# Patient Record
Sex: MALE | Race: ASIAN | NOT HISPANIC OR LATINO | Employment: OTHER | URBAN - METROPOLITAN AREA
[De-identification: names, ages, dates, MRNs, and addresses within clinical notes are randomized per-mention and may not be internally consistent; named-entity substitution may affect disease eponyms.]

---

## 2017-01-05 ENCOUNTER — COMMUNICATION - HEALTHEAST (OUTPATIENT)
Dept: INTERNAL MEDICINE | Facility: CLINIC | Age: 82
End: 2017-01-05

## 2017-01-05 DIAGNOSIS — E78.5 HYPERLIPEMIA: ICD-10-CM

## 2017-01-05 DIAGNOSIS — I10 ESSENTIAL HYPERTENSION, BENIGN: ICD-10-CM

## 2017-03-21 ENCOUNTER — OFFICE VISIT - HEALTHEAST (OUTPATIENT)
Dept: INTERNAL MEDICINE | Facility: CLINIC | Age: 82
End: 2017-03-21

## 2017-03-21 DIAGNOSIS — E03.9 HYPOTHYROIDISM: ICD-10-CM

## 2017-03-21 DIAGNOSIS — I10 ESSENTIAL HYPERTENSION, BENIGN: ICD-10-CM

## 2017-03-23 ENCOUNTER — COMMUNICATION - HEALTHEAST (OUTPATIENT)
Dept: INTERNAL MEDICINE | Facility: CLINIC | Age: 82
End: 2017-03-23

## 2017-03-23 DIAGNOSIS — E78.5 HYPERLIPEMIA: ICD-10-CM

## 2017-04-06 ENCOUNTER — OFFICE VISIT - HEALTHEAST (OUTPATIENT)
Dept: INTERNAL MEDICINE | Facility: CLINIC | Age: 82
End: 2017-04-06

## 2017-04-06 DIAGNOSIS — I10 ESSENTIAL HYPERTENSION, BENIGN: ICD-10-CM

## 2017-04-06 DIAGNOSIS — E78.5 HLD (HYPERLIPIDEMIA): ICD-10-CM

## 2017-04-06 DIAGNOSIS — R25.2 CRAMP OF BOTH LOWER EXTREMITIES: ICD-10-CM
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
## 2017-04-06 LAB
ATRIAL RATE - MUSE: 73 BPM
CHOLEST SERPL-MCNC: 181 MG/DL
DIASTOLIC BLOOD PRESSURE - MUSE: NORMAL MMHG
FASTING STATUS PATIENT QL REPORTED: NO
HDLC SERPL-MCNC: 39 MG/DL
INTERPRETATION ECG - MUSE: NORMAL
LDLC SERPL CALC-MCNC: 95 MG/DL
P AXIS - MUSE: 27 DEGREES
PR INTERVAL - MUSE: 160 MS
QRS DURATION - MUSE: 88 MS
QT - MUSE: 380 MS
QTC - MUSE: 418 MS
R AXIS - MUSE: -1 DEGREES
SYSTOLIC BLOOD PRESSURE - MUSE: NORMAL MMHG
T AXIS - MUSE: -15 DEGREES
TRIGL SERPL-MCNC: 234 MG/DL
VENTRICULAR RATE- MUSE: 73 BPM

## 2017-04-12 ENCOUNTER — COMMUNICATION - HEALTHEAST (OUTPATIENT)
Dept: INTERNAL MEDICINE | Facility: CLINIC | Age: 82
End: 2017-04-12

## 2017-04-12 DIAGNOSIS — N18.30 CKD (CHRONIC KIDNEY DISEASE) STAGE 3, GFR 30-59 ML/MIN (H): ICD-10-CM

## 2017-04-20 ENCOUNTER — AMBULATORY - HEALTHEAST (OUTPATIENT)
Dept: LAB | Facility: CLINIC | Age: 82
End: 2017-04-20

## 2017-04-20 DIAGNOSIS — N18.30 CKD (CHRONIC KIDNEY DISEASE) STAGE 3, GFR 30-59 ML/MIN (H): ICD-10-CM

## 2017-06-24 ENCOUNTER — COMMUNICATION - HEALTHEAST (OUTPATIENT)
Dept: INTERNAL MEDICINE | Facility: CLINIC | Age: 82
End: 2017-06-24

## 2017-06-24 DIAGNOSIS — E78.5 HYPERLIPEMIA: ICD-10-CM

## 2017-06-27 ENCOUNTER — OFFICE VISIT - HEALTHEAST (OUTPATIENT)
Dept: INTERNAL MEDICINE | Facility: CLINIC | Age: 82
End: 2017-06-27

## 2017-06-27 DIAGNOSIS — H54.3 LOW, VISION, BOTH EYES: ICD-10-CM

## 2017-06-27 DIAGNOSIS — E78.5 HYPERLIPEMIA: ICD-10-CM

## 2017-06-27 DIAGNOSIS — H54.7 UNSPECIFIED VISUAL LOSS: ICD-10-CM

## 2017-06-27 DIAGNOSIS — H91.93 HARD OF HEARING, BILATERAL: ICD-10-CM

## 2017-06-27 DIAGNOSIS — E78.5 HLD (HYPERLIPIDEMIA): ICD-10-CM

## 2017-06-27 DIAGNOSIS — I10 HTN (HYPERTENSION): ICD-10-CM

## 2017-06-27 DIAGNOSIS — I10 ESSENTIAL HYPERTENSION, BENIGN: ICD-10-CM

## 2017-06-27 DIAGNOSIS — E03.9 HYPOTHYROIDISM: ICD-10-CM

## 2017-09-19 ENCOUNTER — OFFICE VISIT - HEALTHEAST (OUTPATIENT)
Dept: INTERNAL MEDICINE | Facility: CLINIC | Age: 82
End: 2017-09-19

## 2017-09-19 DIAGNOSIS — S23.41XA RIB SPRAIN: ICD-10-CM

## 2017-10-09 ENCOUNTER — COMMUNICATION - HEALTHEAST (OUTPATIENT)
Dept: FAMILY MEDICINE | Facility: CLINIC | Age: 82
End: 2017-10-09

## 2017-10-09 DIAGNOSIS — M81.0 OSTEOPOROSIS: ICD-10-CM

## 2017-10-09 DIAGNOSIS — Z00.00 HEALTHCARE MAINTENANCE: ICD-10-CM

## 2017-12-20 ENCOUNTER — OFFICE VISIT - HEALTHEAST (OUTPATIENT)
Dept: INTERNAL MEDICINE | Facility: CLINIC | Age: 82
End: 2017-12-20

## 2017-12-20 DIAGNOSIS — E78.5 HLD (HYPERLIPIDEMIA): ICD-10-CM

## 2017-12-20 DIAGNOSIS — E03.9 HYPOTHYROIDISM: ICD-10-CM

## 2017-12-20 DIAGNOSIS — I10 HTN (HYPERTENSION): ICD-10-CM

## 2018-01-03 ENCOUNTER — COMMUNICATION - HEALTHEAST (OUTPATIENT)
Dept: INTERNAL MEDICINE | Facility: CLINIC | Age: 83
End: 2018-01-03

## 2018-01-04 ENCOUNTER — OFFICE VISIT - HEALTHEAST (OUTPATIENT)
Dept: INTERNAL MEDICINE | Facility: CLINIC | Age: 83
End: 2018-01-04

## 2018-01-04 ENCOUNTER — COMMUNICATION - HEALTHEAST (OUTPATIENT)
Dept: INTERNAL MEDICINE | Facility: CLINIC | Age: 83
End: 2018-01-04

## 2018-01-04 DIAGNOSIS — K62.5 RECTAL BLEEDING: ICD-10-CM

## 2018-01-04 LAB — HGB BLD-MCNC: 17 G/DL (ref 14–18)

## 2018-04-11 ENCOUNTER — OFFICE VISIT - HEALTHEAST (OUTPATIENT)
Dept: INTERNAL MEDICINE | Facility: CLINIC | Age: 83
End: 2018-04-11

## 2018-04-11 DIAGNOSIS — H91.90 HARD OF HEARING: ICD-10-CM

## 2018-04-11 DIAGNOSIS — Z02.89 ENCOUNTER FOR COMPLETION OF FORM WITH PATIENT: ICD-10-CM

## 2018-04-11 DIAGNOSIS — E78.5 HYPERLIPEMIA: ICD-10-CM

## 2018-04-11 DIAGNOSIS — K59.00 CONSTIPATION: ICD-10-CM

## 2018-04-11 DIAGNOSIS — I10 ESSENTIAL HYPERTENSION, BENIGN: ICD-10-CM

## 2018-04-11 RX ORDER — ACETAMINOPHEN 500 MG
500 TABLET ORAL EVERY 4 HOURS PRN
Qty: 30 TABLET | Refills: 0 | Status: SHIPPED | OUTPATIENT
Start: 2018-04-11 | End: 2023-11-02

## 2018-04-11 RX ORDER — POLYETHYLENE GLYCOL 3350 17 G/17G
17 POWDER, FOR SOLUTION ORAL DAILY PRN
Qty: 510 G | Refills: 5 | Status: SHIPPED | OUTPATIENT
Start: 2018-04-11 | End: 2021-09-26

## 2018-06-01 ENCOUNTER — SURGERY - HEALTHEAST (OUTPATIENT)
Dept: SURGERY | Facility: HOSPITAL | Age: 83
End: 2018-06-01

## 2018-06-01 ENCOUNTER — ANESTHESIA - HEALTHEAST (OUTPATIENT)
Dept: SURGERY | Facility: HOSPITAL | Age: 83
End: 2018-06-01

## 2018-06-01 ASSESSMENT — MIFFLIN-ST. JEOR
SCORE: 1261.5
SCORE: 1258.72
SCORE: 1258.72
SCORE: 1261.5

## 2018-06-02 ENCOUNTER — SURGERY - HEALTHEAST (OUTPATIENT)
Dept: SURGERY | Facility: HOSPITAL | Age: 83
End: 2018-06-02

## 2018-06-02 ENCOUNTER — ANESTHESIA - HEALTHEAST (OUTPATIENT)
Dept: SURGERY | Facility: HOSPITAL | Age: 83
End: 2018-06-02

## 2018-06-02 ASSESSMENT — MIFFLIN-ST. JEOR
SCORE: 1285.48
SCORE: 1285.48

## 2018-06-07 ENCOUNTER — OFFICE VISIT - HEALTHEAST (OUTPATIENT)
Dept: INTERNAL MEDICINE | Facility: CLINIC | Age: 83
End: 2018-06-07

## 2018-06-07 DIAGNOSIS — I10 HTN (HYPERTENSION): ICD-10-CM

## 2018-06-07 DIAGNOSIS — I71.60 THORACOABDOMINAL AORTIC ANEURYSM (TAAA) WITHOUT RUPTURE (H): ICD-10-CM

## 2018-06-07 DIAGNOSIS — N17.9 AKI (ACUTE KIDNEY INJURY) (H): ICD-10-CM

## 2018-06-07 DIAGNOSIS — Z90.49 S/P CHOLECYSTECTOMY: ICD-10-CM

## 2018-06-07 DIAGNOSIS — Z09 HOSPITAL DISCHARGE FOLLOW-UP: ICD-10-CM

## 2018-06-07 DIAGNOSIS — K80.50 CHOLEDOCHOLITHIASIS: ICD-10-CM

## 2018-06-07 LAB
ALBUMIN SERPL-MCNC: 3.3 G/DL (ref 3.5–5)
ALP SERPL-CCNC: 104 U/L (ref 45–120)
ALT SERPL W P-5'-P-CCNC: 129 U/L (ref 0–45)
ANION GAP SERPL CALCULATED.3IONS-SCNC: 11 MMOL/L (ref 5–18)
AST SERPL W P-5'-P-CCNC: 46 U/L (ref 0–40)
BASOPHILS # BLD AUTO: 0.1 THOU/UL (ref 0–0.2)
BASOPHILS NFR BLD AUTO: 1 % (ref 0–2)
BILIRUB DIRECT SERPL-MCNC: 0.6 MG/DL
BILIRUB SERPL-MCNC: 1.5 MG/DL (ref 0–1)
BUN SERPL-MCNC: 21 MG/DL (ref 8–28)
CALCIUM SERPL-MCNC: 8.8 MG/DL (ref 8.5–10.5)
CHLORIDE BLD-SCNC: 105 MMOL/L (ref 98–107)
CO2 SERPL-SCNC: 24 MMOL/L (ref 22–31)
CREAT SERPL-MCNC: 1.12 MG/DL (ref 0.7–1.3)
EOSINOPHIL # BLD AUTO: 0.2 THOU/UL (ref 0–0.4)
EOSINOPHIL NFR BLD AUTO: 2 % (ref 0–6)
ERYTHROCYTE [DISTWIDTH] IN BLOOD BY AUTOMATED COUNT: 11.3 % (ref 11–14.5)
GFR SERPL CREATININE-BSD FRML MDRD: >60 ML/MIN/1.73M2
GLUCOSE BLD-MCNC: 115 MG/DL (ref 70–125)
HCT VFR BLD AUTO: 49.4 % (ref 40–54)
HGB BLD-MCNC: 16.3 G/DL (ref 14–18)
LYMPHOCYTES # BLD AUTO: 2 THOU/UL (ref 0.8–4.4)
LYMPHOCYTES NFR BLD AUTO: 21 % (ref 20–40)
MAGNESIUM SERPL-MCNC: 2.1 MG/DL (ref 1.8–2.6)
MCH RBC QN AUTO: 32.4 PG (ref 27–34)
MCHC RBC AUTO-ENTMCNC: 33 G/DL (ref 32–36)
MCV RBC AUTO: 98 FL (ref 80–100)
MONOCYTES # BLD AUTO: 1 THOU/UL (ref 0–0.9)
MONOCYTES NFR BLD AUTO: 10 % (ref 2–10)
NEUTROPHILS # BLD AUTO: 6.6 THOU/UL (ref 2–7.7)
NEUTROPHILS NFR BLD AUTO: 66 % (ref 50–70)
PHOSPHATE SERPL-MCNC: 3.3 MG/DL (ref 2.5–4.5)
PLATELET # BLD AUTO: 141 THOU/UL (ref 140–440)
PMV BLD AUTO: 7.4 FL (ref 7–10)
POTASSIUM BLD-SCNC: 4.2 MMOL/L (ref 3.5–5)
PROT SERPL-MCNC: 6.8 G/DL (ref 6–8)
RBC # BLD AUTO: 5.02 MILL/UL (ref 4.4–6.2)
SODIUM SERPL-SCNC: 140 MMOL/L (ref 136–145)
WBC: 9.9 THOU/UL (ref 4–11)

## 2018-06-11 ENCOUNTER — COMMUNICATION - HEALTHEAST (OUTPATIENT)
Dept: INTERNAL MEDICINE | Facility: CLINIC | Age: 83
End: 2018-06-11

## 2018-09-27 ENCOUNTER — COMMUNICATION - HEALTHEAST (OUTPATIENT)
Dept: FAMILY MEDICINE | Facility: CLINIC | Age: 83
End: 2018-09-27

## 2018-09-27 DIAGNOSIS — M81.0 OSTEOPOROSIS: ICD-10-CM

## 2018-09-27 DIAGNOSIS — Z00.00 HEALTHCARE MAINTENANCE: ICD-10-CM

## 2019-05-29 ENCOUNTER — OFFICE VISIT - HEALTHEAST (OUTPATIENT)
Dept: INTERNAL MEDICINE | Facility: CLINIC | Age: 84
End: 2019-05-29

## 2019-05-29 DIAGNOSIS — I10 ESSENTIAL HYPERTENSION: ICD-10-CM

## 2019-05-29 DIAGNOSIS — I71.60 THORACOABDOMINAL AORTIC ANEURYSM (TAAA) WITHOUT RUPTURE (H): ICD-10-CM

## 2019-05-29 DIAGNOSIS — D58.2 ELEVATED HEMOGLOBIN (H): ICD-10-CM

## 2019-05-29 DIAGNOSIS — E78.2 MIXED HYPERLIPIDEMIA: ICD-10-CM

## 2019-05-29 DIAGNOSIS — Z00.00 WELCOME TO MEDICARE PREVENTIVE VISIT: ICD-10-CM

## 2019-05-29 DIAGNOSIS — R41.3 MEMORY CHANGES: ICD-10-CM

## 2019-05-29 DIAGNOSIS — E03.9 ACQUIRED HYPOTHYROIDISM: ICD-10-CM

## 2019-05-29 LAB
ALBUMIN SERPL-MCNC: 4 G/DL (ref 3.5–5)
ALP SERPL-CCNC: 57 U/L (ref 45–120)
ALT SERPL W P-5'-P-CCNC: 21 U/L (ref 0–45)
ANION GAP SERPL CALCULATED.3IONS-SCNC: 11 MMOL/L (ref 5–18)
AST SERPL W P-5'-P-CCNC: 27 U/L (ref 0–40)
BILIRUB DIRECT SERPL-MCNC: 0.5 MG/DL
BILIRUB SERPL-MCNC: 1.5 MG/DL (ref 0–1)
BUN SERPL-MCNC: 29 MG/DL (ref 8–28)
CALCIUM SERPL-MCNC: 9.9 MG/DL (ref 8.5–10.5)
CHLORIDE BLD-SCNC: 104 MMOL/L (ref 98–107)
CHOLEST SERPL-MCNC: 186 MG/DL
CO2 SERPL-SCNC: 24 MMOL/L (ref 22–31)
CREAT SERPL-MCNC: 1.38 MG/DL (ref 0.7–1.3)
ERYTHROCYTE [DISTWIDTH] IN BLOOD BY AUTOMATED COUNT: 11.2 % (ref 11–14.5)
FASTING STATUS PATIENT QL REPORTED: YES
GFR SERPL CREATININE-BSD FRML MDRD: 49 ML/MIN/1.73M2
GLUCOSE BLD-MCNC: 92 MG/DL (ref 70–125)
HCT VFR BLD AUTO: 56 % (ref 40–54)
HDLC SERPL-MCNC: 48 MG/DL
HGB BLD-MCNC: 18.8 G/DL (ref 14–18)
IRON SERPL-MCNC: 89 UG/DL (ref 42–175)
LDLC SERPL CALC-MCNC: 107 MG/DL
MCH RBC QN AUTO: 32.8 PG (ref 27–34)
MCHC RBC AUTO-ENTMCNC: 33.5 G/DL (ref 32–36)
MCV RBC AUTO: 98 FL (ref 80–100)
PLATELET # BLD AUTO: 169 THOU/UL (ref 140–440)
PMV BLD AUTO: 7.8 FL (ref 7–10)
POTASSIUM BLD-SCNC: 3.6 MMOL/L (ref 3.5–5)
PROT SERPL-MCNC: 7.5 G/DL (ref 6–8)
RBC # BLD AUTO: 5.72 MILL/UL (ref 4.4–6.2)
SODIUM SERPL-SCNC: 139 MMOL/L (ref 136–145)
T3 SERPL-MCNC: 96 NG/DL (ref 45–175)
T4 FREE SERPL-MCNC: 0.7 NG/DL (ref 0.7–1.8)
TRIGL SERPL-MCNC: 157 MG/DL
TSH SERPL DL<=0.005 MIU/L-ACNC: 8.15 UIU/ML (ref 0.3–5)
WBC: 6.2 THOU/UL (ref 4–11)

## 2019-05-29 RX ORDER — ATORVASTATIN CALCIUM 40 MG/1
40 TABLET, FILM COATED ORAL AT BEDTIME
Qty: 90 TABLET | Refills: 3 | Status: SHIPPED | OUTPATIENT
Start: 2019-05-29 | End: 2021-09-21

## 2019-05-29 ASSESSMENT — MIFFLIN-ST. JEOR: SCORE: 1231.5

## 2019-06-05 ENCOUNTER — COMMUNICATION - HEALTHEAST (OUTPATIENT)
Dept: INTERNAL MEDICINE | Facility: CLINIC | Age: 84
End: 2019-06-05

## 2019-06-12 ENCOUNTER — HOSPITAL ENCOUNTER (OUTPATIENT)
Dept: CT IMAGING | Facility: HOSPITAL | Age: 84
Discharge: HOME | End: 2019-06-12
Attending: INTERPRETER

## 2019-06-12 DIAGNOSIS — I71.60 THORACOABDOMINAL AORTIC ANEURYSM (TAAA) WITHOUT RUPTURE (H): ICD-10-CM

## 2019-06-12 LAB
CREAT BLD-MCNC: 1.1 MG/DL (ref 0.7–1.3)
GFR SERPL CREATININE-BSD FRML MDRD: >60 ML/MIN/1.73M2

## 2019-06-18 ENCOUNTER — COMMUNICATION - HEALTHEAST (OUTPATIENT)
Dept: INTERNAL MEDICINE | Facility: CLINIC | Age: 84
End: 2019-06-18

## 2019-06-21 ENCOUNTER — COMMUNICATION - HEALTHEAST (OUTPATIENT)
Dept: INTERNAL MEDICINE | Facility: CLINIC | Age: 84
End: 2019-06-21

## 2019-06-21 DIAGNOSIS — E78.5 HYPERLIPEMIA: ICD-10-CM

## 2019-09-19 ENCOUNTER — COMMUNICATION - HEALTHEAST (OUTPATIENT)
Dept: FAMILY MEDICINE | Facility: CLINIC | Age: 84
End: 2019-09-19

## 2019-09-19 DIAGNOSIS — Z00.00 HEALTHCARE MAINTENANCE: ICD-10-CM

## 2019-09-19 DIAGNOSIS — M81.0 OSTEOPOROSIS: ICD-10-CM

## 2020-06-11 ENCOUNTER — COMMUNICATION - HEALTHEAST (OUTPATIENT)
Dept: INTERNAL MEDICINE | Facility: CLINIC | Age: 85
End: 2020-06-11

## 2020-06-11 DIAGNOSIS — I10 ESSENTIAL HYPERTENSION: ICD-10-CM

## 2020-09-29 ENCOUNTER — COMMUNICATION - HEALTHEAST (OUTPATIENT)
Dept: INTERNAL MEDICINE | Facility: CLINIC | Age: 85
End: 2020-09-29

## 2020-09-29 DIAGNOSIS — M81.0 OSTEOPOROSIS: ICD-10-CM

## 2020-12-09 ENCOUNTER — COMMUNICATION - HEALTHEAST (OUTPATIENT)
Dept: INTERNAL MEDICINE | Facility: CLINIC | Age: 85
End: 2020-12-09

## 2020-12-09 DIAGNOSIS — I10 ESSENTIAL HYPERTENSION: ICD-10-CM

## 2021-01-08 ENCOUNTER — OFFICE VISIT - HEALTHEAST (OUTPATIENT)
Dept: INTERNAL MEDICINE | Facility: CLINIC | Age: 86
End: 2021-01-08

## 2021-01-08 ENCOUNTER — RECORDS - HEALTHEAST (OUTPATIENT)
Dept: ADMINISTRATIVE | Facility: OTHER | Age: 86
End: 2021-01-08

## 2021-01-08 DIAGNOSIS — I71.60 THORACOABDOMINAL AORTIC ANEURYSM (TAAA) WITHOUT RUPTURE (H): ICD-10-CM

## 2021-01-08 DIAGNOSIS — I10 ESSENTIAL HYPERTENSION: ICD-10-CM

## 2021-01-08 DIAGNOSIS — H91.93 BILATERAL HEARING LOSS, UNSPECIFIED HEARING LOSS TYPE: ICD-10-CM

## 2021-01-08 DIAGNOSIS — E03.8 SUBCLINICAL HYPOTHYROIDISM: ICD-10-CM

## 2021-01-08 DIAGNOSIS — D58.2 ELEVATED HEMOGLOBIN (H): ICD-10-CM

## 2021-01-08 DIAGNOSIS — E78.2 MIXED HYPERLIPIDEMIA: ICD-10-CM

## 2021-01-08 DIAGNOSIS — H53.8 BLURRED VISION: ICD-10-CM

## 2021-01-08 LAB
ALBUMIN SERPL-MCNC: 4.1 G/DL (ref 3.5–5)
ALP SERPL-CCNC: 70 U/L (ref 45–120)
ALT SERPL W P-5'-P-CCNC: 17 U/L (ref 0–45)
ANION GAP SERPL CALCULATED.3IONS-SCNC: 11 MMOL/L (ref 5–18)
AST SERPL W P-5'-P-CCNC: 23 U/L (ref 0–40)
BASOPHILS # BLD AUTO: 0.1 THOU/UL (ref 0–0.2)
BASOPHILS NFR BLD AUTO: 1 % (ref 0–2)
BILIRUB SERPL-MCNC: 1.1 MG/DL (ref 0–1)
BUN SERPL-MCNC: 22 MG/DL (ref 8–28)
CALCIUM SERPL-MCNC: 9.2 MG/DL (ref 8.5–10.5)
CHLORIDE BLD-SCNC: 102 MMOL/L (ref 98–107)
CO2 SERPL-SCNC: 27 MMOL/L (ref 22–31)
CREAT SERPL-MCNC: 1.19 MG/DL (ref 0.7–1.3)
EOSINOPHIL # BLD AUTO: 0.1 THOU/UL (ref 0–0.4)
EOSINOPHIL NFR BLD AUTO: 1 % (ref 0–6)
ERYTHROCYTE [DISTWIDTH] IN BLOOD BY AUTOMATED COUNT: 12.8 % (ref 11–14.5)
GFR SERPL CREATININE-BSD FRML MDRD: 58 ML/MIN/1.73M2
GLUCOSE BLD-MCNC: 93 MG/DL (ref 70–125)
HCT VFR BLD AUTO: 57.8 % (ref 40–54)
HGB BLD-MCNC: 18.8 G/DL (ref 14–18)
IMM GRANULOCYTES # BLD: 0 THOU/UL
IMM GRANULOCYTES NFR BLD: 0 %
LYMPHOCYTES # BLD AUTO: 2.5 THOU/UL (ref 0.8–4.4)
LYMPHOCYTES NFR BLD AUTO: 39 % (ref 20–40)
MCH RBC QN AUTO: 31.5 PG (ref 27–34)
MCHC RBC AUTO-ENTMCNC: 32.5 G/DL (ref 32–36)
MCV RBC AUTO: 97 FL (ref 80–100)
MONOCYTES # BLD AUTO: 0.5 THOU/UL (ref 0–0.9)
MONOCYTES NFR BLD AUTO: 8 % (ref 2–10)
NEUTROPHILS # BLD AUTO: 3.3 THOU/UL (ref 2–7.7)
NEUTROPHILS NFR BLD AUTO: 51 % (ref 50–70)
PLATELET # BLD AUTO: 155 THOU/UL (ref 140–440)
PMV BLD AUTO: 10.8 FL (ref 8.5–12.5)
POTASSIUM BLD-SCNC: 3.9 MMOL/L (ref 3.5–5)
PROT SERPL-MCNC: 7.7 G/DL (ref 6–8)
RBC # BLD AUTO: 5.96 MILL/UL (ref 4.4–6.2)
SODIUM SERPL-SCNC: 140 MMOL/L (ref 136–145)
T4 FREE SERPL-MCNC: 0.7 NG/DL (ref 0.7–1.8)
TSH SERPL DL<=0.005 MIU/L-ACNC: 6.7 UIU/ML (ref 0.3–5)
WBC: 6.5 THOU/UL (ref 4–11)

## 2021-01-08 RX ORDER — ATORVASTATIN CALCIUM 40 MG/1
TABLET, FILM COATED ORAL
Qty: 90 TABLET | Refills: 3 | Status: SHIPPED | OUTPATIENT
Start: 2021-01-08 | End: 2021-09-26

## 2021-01-08 RX ORDER — LISINOPRIL AND HYDROCHLOROTHIAZIDE 20; 25 MG/1; MG/1
1 TABLET ORAL DAILY
Qty: 30 TABLET | Refills: 0 | Status: SHIPPED | OUTPATIENT
Start: 2021-01-08 | End: 2021-09-26

## 2021-01-12 ENCOUNTER — COMMUNICATION - HEALTHEAST (OUTPATIENT)
Dept: FAMILY MEDICINE | Facility: CLINIC | Age: 86
End: 2021-01-12

## 2021-01-19 ENCOUNTER — HOSPITAL ENCOUNTER (OUTPATIENT)
Dept: CT IMAGING | Facility: HOSPITAL | Age: 86
Discharge: HOME | End: 2021-01-19
Attending: PEDIATRICS

## 2021-01-19 DIAGNOSIS — I71.60 THORACOABDOMINAL AORTIC ANEURYSM (TAAA) WITHOUT RUPTURE (H): ICD-10-CM

## 2021-01-22 ENCOUNTER — COMMUNICATION - HEALTHEAST (OUTPATIENT)
Dept: PEDIATRICS | Facility: CLINIC | Age: 86
End: 2021-01-22

## 2021-03-11 ENCOUNTER — OFFICE VISIT - HEALTHEAST (OUTPATIENT)
Dept: AUDIOLOGY | Facility: CLINIC | Age: 86
End: 2021-03-11

## 2021-03-11 DIAGNOSIS — H90.3 ASYMMETRIC SNHL (SENSORINEURAL HEARING LOSS): ICD-10-CM

## 2021-03-12 ENCOUNTER — COMMUNICATION - HEALTHEAST (OUTPATIENT)
Dept: ADMINISTRATIVE | Facility: CLINIC | Age: 86
End: 2021-03-12

## 2021-05-17 ENCOUNTER — OFFICE VISIT - HEALTHEAST (OUTPATIENT)
Dept: FAMILY MEDICINE | Facility: CLINIC | Age: 86
End: 2021-05-17

## 2021-05-17 DIAGNOSIS — B02.8 HERPES ZOSTER WITH COMPLICATION: ICD-10-CM

## 2021-05-27 VITALS
OXYGEN SATURATION: 97 % | HEART RATE: 74 BPM | DIASTOLIC BLOOD PRESSURE: 92 MMHG | SYSTOLIC BLOOD PRESSURE: 143 MMHG | TEMPERATURE: 98.7 F | WEIGHT: 153 LBS | RESPIRATION RATE: 10 BRPM

## 2021-05-29 NOTE — TELEPHONE ENCOUNTER
----- Message from KATE Sotomayor sent at 5/30/2019  2:29 PM CDT -----  [place] patient with : his thyroid levels show slightly low levels of hormone but not enough to start a medication. We will continue to monitor this yearly. His liver function labs are stable. Cholesterol levels are good overall. Blood sugar is normal.

## 2021-05-29 NOTE — PROGRESS NOTES
Assessment and Plan:     1. Welcome to Medicare preventive visit  Memory changes  - [facility] LAV  -Notes memory changes but declines neuropsychology evaluation.  Unable to utilize memory screening test as patient is unfamiliar with the use of a clock.    2. 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 Acquired hypothyroidism  - subclinical, not treated   - Thyroid Stimulating Hormone (TSH)  - T3, Total  - T4, Free    4. Mixed hyperlipidemia  - tolerates statin well  - could consider discontinuation due to age   - Hepatic Profile  - Basic Metabolic Panel  - Lipid Profile  - [facility] LAV  - atorvastatin (LIPITOR) 40 MG tablet; Take 1 tablet (40 mg total) by mouth at bedtime.  Dispense: 90 tablet; Refill: 3    5. Essential hypertension  - stable   - Basic Metabolic Panel  - lisinopril-hydrochlorothiazide (PRINZIDE,ZESTORETIC) 20-25 mg per tablet; Take 1 tablet by mouth daily.  Dispense: 90 tablet; Refill: 3    6. Elevated hemoglobin (H)  - Iron      The patient's current medical problems were reviewed.    The following high BMI interventions were performed this visit: encouragement to exercise  The following health maintenance schedule was reviewed with the patient and provided in printed form in the after visit summary:   Health Maintenance   Topic Date Due     ZOSTER VACCINES (2 of 2) 05/07/2015     ADVANCE DIRECTIVES DISCUSSED WITH PATIENT  07/20/2017     COLONOSCOPY  11/22/2019     INFLUENZA VACCINE RULE BASED (Season Ended) 08/01/2019     FALL RISK ASSESSMENT  05/29/2020     TD 18+ HE  05/04/2021     PNEUMOCOCCAL POLYSACCHARIDE VACCINE AGE 65 AND OVER  Completed     PNEUMOCOCCAL CONJUGATE VACCINE FOR ADULTS (PCV13 OR PREVNAR)  Addressed        Subjective:   Chief Complaint: Colin Car is an 84 y.o. male here for a Welcome to Medicare visit.     HPI:  Colin Car is an 84 y.o. male here for a Welcome to Medicare visit.   He is here today with his daughter-in-law and a professional .    He has a history of constipation with a reported episode of blood in the stool last year x 1. This has not recurred. He is not having constipation.  His last colonoscopy was in November 2016.  He had an adenomatous polyp and a 3-year follow-up was recommended.  He and his daughter-in-law would like to consider this but are not strongly inclined to have him undergo this test unless unless he was symptomatic.     He was unable to use the clock draw test as he is not familiar with using a clock and did not understand the instructions.  His family has noticed changes in his memory over the past 1 year.  They noticed that he has a difficult time remembering things.  He does have family with him 24 hours a day and also participates in adult .  I discussed potential for referral to a provider specializing in [place] neuropsychology evaluation.  The family declines at this time.    History of hypertension, he denies any lightheadedness or dizziness associated with this treatment.    He has a history of hyperlipidemia and continues atorvastatin 40 mg daily.  He is not experiencing any myalgias associated with this medication.    Subclinical hypothyroidism has been noted in the past.  He is not experiencing any excessive fatigue, skin or hair changes.    Review of Systems:  Please see above.  The rest of the review of systems are negative for all systems.    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 CTA chest 6/1/2018      Past Medical History:   Diagnosis Date     Hyperlipidemia      Hypertension      Hypothyroidism      Mild Recurrent Major Depression     Created by Conversion       Past Surgical History:   Procedure Laterality Date     ERCP N/A 6/2/2018    Procedure: ENDOSCOPIC RETROGRADE CHOLANGIOPANCREATOGRAPHY; SPHINCTEROTOMY AND STONE EXTRACTION;  Surgeon: Juve Shannon MD;  Location: [facility];  Service:      LAPAROSCOPIC CHOLECYSTECTOMY N/A 6/1/2018    Procedure: CHOLECYSTECTOMY, LAPAROSCOPIC, COMMON DUCT EXPLORATION;  Surgeon: Meir Beck MD;  Location: [facility];  Service:      NO PAST SURGERIES        Family History   Problem Relation Age of Onset     No Medical Problems Mother      No Medical Problems Father      No Medical Problems Brother      No Medical Problems Brother       Social History     Socioeconomic History     Marital status: Single     Spouse name: Not on file     Number of children: Not on file     Years of education: Not on file     Highest education level: Not on file   Occupational History     Not on file   Social Needs     Financial resource strain: Not on file     Food insecurity:     Worry: Not on file     Inability: Not on file     Transportation needs:     Medical: Not on file     Non-medical: Not on file   Tobacco Use     Smoking status: Never Smoker     Smokeless tobacco: Never Used   Substance and Sexual Activity     Alcohol use: No     Drug use: No     Sexual activity: Not on file   Lifestyle     Physical activity:     Days per week: Not on file     Minutes per session: Not on file     Stress: Not on file   Relationships     Social connections:     Talks on phone: Not on file     Gets together: Not on file     Attends Shinto service: Not on file     Active member of club or organization: Not on file     Attends meetings of clubs or organizations: Not on file     Relationship status: Not on file     Intimate partner violence:     Fear of current or ex partner:  Not on file     Emotionally abused: Not on file     Physically abused: Not on file     Forced sexual activity: Not on file   Other Topics Concern     Not on file   Social History Narrative     Not on file       Current Outpatient Medications   Medication Sig Dispense Refill     acetaminophen (TYLENOL EXTRA STRENGTH) 500 MG tablet Take 1 tablet (500 mg total) by mouth every 4 (four) hours as needed for pain. 30 tablet 0     ASPIR-LOW 81 mg EC tablet TAKE 1 PILL (81 MG TOTAL) BY MOUTH EVERY DAY/ [facility] NOJ 1 LUB [facility] NTSV KHIAV ZOO. 90 tablet 3     atorvastatin (LIPITOR) 40 MG tablet Take 1 tablet (40 mg total) by mouth at bedtime. 90 tablet 3     calcium carbonate-vitamin D3 (CALTRATE 600 PLUS D3) 600 mg(1,500mg) -400 unit per tablet TAKE 1 PILL BY MOUTH EVERY DAY/TXA UB NOJ 1 [facility] POBTXHA 90 tablet 3     lisinopril-hydrochlorothiazide (PRINZIDE,ZESTORETIC) 20-25 mg per tablet Take 1 tablet by mouth daily. 90 tablet 3     polyethylene glycol (GLYCOLAX) 17 gram/dose powder Take 17 g by mouth daily as needed (constipation). 510 g 5     No current facility-administered medications for this visit.       Objective:   Vital Signs:   Visit Vitals  /72   Ht 5' (1.524 m)   Wt 153 lb (69.4 kg)   BMI 29.88 kg/m         VisionScreening:   Visual Acuity Screening    Right eye Left eye Both eyes   Without correction: 10/16 10/16 10/16   With correction:           PHYSICAL EXAM  Physical Exam   Constitutional: He is well-developed, well-nourished, and in no distress.   HENT:   Head: Normocephalic and atraumatic.   Right Ear: External ear normal.   Left Ear: External ear normal.   Nose: Nose normal.   Mouth/Throat: Oropharynx is clear and moist.   Eyes: Pupils are equal, round, and reactive to light. Conjunctivae and EOM are normal. Right eye exhibits no discharge. Left eye exhibits no discharge. No scleral icterus.   Neck: Normal range of motion. Neck supple. No thyromegaly present.   Cardiovascular:  Normal rate, regular rhythm, normal heart sounds and intact distal pulses. Exam reveals no gallop and no friction rub.   No murmur heard.  Pulmonary/Chest: Effort normal and breath sounds normal.   Abdominal: Bowel sounds are normal.   Musculoskeletal: Normal range of motion. He exhibits no edema.   Lymphadenopathy:     He has no cervical adenopathy.   Neurological: He is alert.   Psychiatric: Mood, memory, affect and judgment normal.         Assessment Results 5/29/2019   Activities of Daily Living 2-4 - Moderate impairment   Instrumental Activities of Daily Living 5-6 - Severe functional impairment   Some recent data might be hidden     A Mini Cog score of 0-2 suggests the possibility of dementia, score of 3-5 suggests no dementia    Identified Health Risks:     He is at risk for lack of exercise and has been provided with information to increase physical activity for the benefit of his well-being.  The patient reports that he has difficulty with activities of daily living.  He should not and family have no concerns regarding this, they already have family members and an adult  helping him with activities of daily living.  The patient reports that he has difficulty with instrumental activities of daily living.  He denies assistance with resources.   The patient was provided with written information regarding signs of hearing loss.  Information regarding advance directives (living bearden), including where he can download the appropriate form, was provided to the patient via the AVS.

## 2021-05-29 NOTE — TELEPHONE ENCOUNTER
Resent approval to same pharmacy. They do not have it.    Order Providers     Prescribing Provider Encounter Provider   Allison Belle FNP Roth, Megan Carissa, FNP   Outpatient Medication Detail      Disp Refills Start End    lisinopril-hydrochlorothiazide (PRINZIDE,ZESTORETIC) 20-25 mg per tablet 90 tablet 3 5/29/2019     Sig - Route: Take 1 tablet by mouth daily. - Oral    Sent to pharmacy as: lisinopril-hydrochlorothiazide (PRINZIDE,ZESTORETIC) 20-25 mg per tablet    E-Prescribing Status: Receipt confirmed by pharmacy (5/29/2019  9:26 AM CDT)    Associated Diagnoses     Essential hypertension       Pharmacy     [facility] - [place], [place] - [facility] ROSA [address]     Olena Andrade, RN, Care Connection Nurse Triage/Med Refills RN

## 2021-05-29 NOTE — TELEPHONE ENCOUNTER
----- Message from KATE Sotomayor sent at 6/12/2019  3:39 PM CDT -----  Call pt with : the results of his CT of the chest show that the aneurysm is stable and has not increased in size. This is a good result.     There was an incidental finding of some enlarged lymph nodes in the chest which is a non-specific finding and may not be anything to worry about. When I see him in the office in 6 months we can discuss possibly rechecking this test.

## 2021-05-30 VITALS — WEIGHT: 156 LBS | BODY MASS INDEX: 30.47 KG/M2

## 2021-05-30 VITALS — BODY MASS INDEX: 30.97 KG/M2 | WEIGHT: 158.6 LBS

## 2021-05-31 VITALS — WEIGHT: 157 LBS | BODY MASS INDEX: 30.66 KG/M2

## 2021-05-31 VITALS — WEIGHT: 158 LBS | BODY MASS INDEX: 30.86 KG/M2

## 2021-05-31 VITALS — BODY MASS INDEX: 30.23 KG/M2 | WEIGHT: 160 LBS

## 2021-06-01 VITALS
BODY MASS INDEX: 32.38 KG/M2 | HEIGHT: 60 IN | WEIGHT: 164.9 LBS | WEIGHT: 164.9 LBS | HEIGHT: 60 IN | BODY MASS INDEX: 32.38 KG/M2

## 2021-06-01 VITALS — WEIGHT: 151 LBS | BODY MASS INDEX: 29.49 KG/M2

## 2021-06-01 VITALS — BODY MASS INDEX: 30.04 KG/M2 | WEIGHT: 159 LBS

## 2021-06-01 NOTE — TELEPHONE ENCOUNTER
RN cannot approve Refill Request    RN can NOT refill this medication med is not covered by policy/route to provider         Rossi Yañez, [facility] Connection Triage/Med Refill 9/19/2019    Requested Prescriptions   Pending Prescriptions Disp Refills     calcium carbonate-vitamin D3 (CALTRATE 600 PLUS D3) 600 mg(1,500mg) -400 unit per tablet [Pharmacy Med Name: CALCIUM 600 + VIT D 400 -400 TAB] 90 tablet 3     Sig: TAKE 1 PILL BY MOUTH EVERY DAY/TXA DARSHANA NOJ 1 LUB [facility] POBTXHA       There is no refill protocol information for this order        aspirin 81 MG EC tablet [Pharmacy Med Name: ASPIRIN 81 MG LOW STRENGTH 81 TAB] 90 tablet 3     Sig: TAKE 1 PILL (81 MG TOTAL) BY MOUTH EVERY DAY/ TXA SHALA NOJ 1 [facility] NTSV KHIAV NAEEMO.       Aspirin/Dipyridamole Refill Protocol Passed - 9/19/2019  9:50 AM        Passed - PCP or prescribing provider visit in past 12 months       Last office visit with prescriber/PCP: Visit date not found OR same dept: Visit date not found OR same specialty: Visit date not found  Last physical: Visit date not found Last MTM visit: Visit date not found    Next appt within 3 mo: Visit date not found Next physical within 3 mo: Visit date not found  Prescriber OR PCP: Eusebio Dodson MD  Last diagnosis associated with med order: 1. Osteoporosis  - calcium carbonate-vitamin D3 (CALTRATE 600 PLUS D3) 600 mg(1,500mg) -400 unit per tablet [Pharmacy Med Name: CALCIUM 600 + VIT D 400 -400 TAB]; TAKE 1 PILL BY MOUTH EVERY DAY/TXA SHALA NOJ 1 LUB [facility] POBTXHA  Dispense: 90 tablet; Refill: 3    2. Healthcare maintenance  - aspirin 81 MG EC tablet [Pharmacy Med Name: ASPIRIN 81 MG LOW STRENGTH 81 TAB]; TAKE 1 PILL (81 MG TOTAL) BY MOUTH EVERY DAY/ TXA UB NOJ 1 LUB [facility] NTSHAV KHIAV ZOO.  Dispense: 90 tablet; Refill: 3    If protocol passes may refill for 6 months if within 3 months of last provider visit (or a total of 9 months).

## 2021-06-03 VITALS — BODY MASS INDEX: 30.04 KG/M2 | WEIGHT: 153 LBS | HEIGHT: 60 IN

## 2021-06-05 VITALS
HEART RATE: 86 BPM | BODY MASS INDEX: 29.8 KG/M2 | WEIGHT: 152.6 LBS | RESPIRATION RATE: 16 BRPM | DIASTOLIC BLOOD PRESSURE: 84 MMHG | SYSTOLIC BLOOD PRESSURE: 132 MMHG

## 2021-06-08 NOTE — TELEPHONE ENCOUNTER
RN cannot approve Refill Request    RN can NOT refill this medication Protocol failed and NO refill given.       Rossi Yañez, Care Connection Triage/Med Refill 6/12/2020    Requested Prescriptions   Pending Prescriptions Disp Refills     lisinopriL-hydrochlorothiazide (PRINZIDE,ZESTORETIC) 20-25 mg per tablet [Pharmacy Med Name: LISINOPRIL-HCTZ 20-25 MG TA 20-25 TAB] 90 tablet 3     Sig: TAKE 1 PILL BY MOUTH EVERY DAY/TXHUA HNUB NOJ 1 LUB [facility] NTSV SIAB       Diuretics/Combination Diuretics Refill Protocol  Failed - 6/11/2020  9:42 AM        Failed - Visit with PCP or prescribing provider visit in past 12 months     Last office visit with prescriber/PCP: 6/7/2018 Allison Belle FNP OR same dept: Visit date not found OR same specialty: 6/7/2018 Allison Belle FNP  Last physical: 5/29/2019 Last MTM visit: Visit date not found   Next visit within 3 mo: Visit date not found  Next physical within 3 mo: Visit date not found  Prescriber OR PCP: KATE Segura  Last diagnosis associated with med order: 1. Essential hypertension  - lisinopriL-hydrochlorothiazide (PRINZIDE,ZESTORETIC) 20-25 mg per tablet [Pharmacy Med Name: LISINOPRIL-HCTZ 20-25 MG TA 20-25 TAB]; TAKE 1 PILL BY MOUTH EVERY DAY/TXHUA HNUB NOJ 1 LUB [facility] ZOO NTSHAV SIAB  Dispense: 90 tablet; Refill: 3    If protocol passes may refill for 12 months if within 3 months of last provider visit (or a total of 15 months).             Failed - Serum Potassium in past 12 months      No results found for: LN-POTASSIUM          Failed - Serum Sodium in past 12 months      No results found for: LN-SODIUM          Failed - Blood pressure on file in past 12 months     BP Readings from Last 1 Encounters:   05/29/19 122/72             Failed - Serum Creatinine in past 12 months      Creatinine   Date Value Ref Range Status   05/29/2019 1.38 (H) 0.70 - 1.30 mg/dL Final

## 2021-06-09 NOTE — PROGRESS NOTES
[facility] Clinic Note  Patient Name: Colin Car  Patient Age: 82 y.o.  YOB: 1935  MRN: [id number]  ?  Date of Visit: 4/6/2017  Reason for Office Visit:   Chief Complaint   Patient presents with     Loss of Consciousness     on tues, fell because of cramping in legs     cramps     in lower legs         HPI: Colin Car 82 y.o. who presents to clinic for syncopal episode Tuesday night (2 nights ago). He is here today with  and wife. He says he had leg cramps and got up from bed to stand and suddenly fell. He did not black out or pass out, which was stated in the triage note. His legs felt weak when he got out of bed and fell down. He did not hit his head.     He denies chest pain, palpitations, headaches, vision changes, dizziness, n/v/GI symptoms.     He was started on zestoretic last month for his blood pressure. This has controlled his BP much better, no lows reported      Review of Systems: As noted in HPI     Current Scheduled Meds:  Outpatient Encounter Prescriptions as of 4/6/2017   Medication Sig Dispense Refill     acetaminophen-codeine (TYLENOL #3) 300-30 mg per tablet Take 1-2 tablets by mouth as needed (every 4-6 hours).       aspirin 81 MG EC tablet Take 1 tablet (81 mg total) by mouth daily. TXHUA HNUB NOJ 1 [facility] 90 tablet 3     atorvastatin (LIPITOR) 40 MG tablet TAKE 1 PILL (40 MG TOTAL) BY MOUTH EVERY NIGHT AT BEDTIME/ TXHUA O NOJ 1 LUB [facility] THAUM MUS [facility] NTSHAV MUAJ ROJ 90 tablet 0     calcium carbonate-vitamin D3 (CALTRATE 600 PLUS D3) 600 mg(1,500mg) -400 unit per tablet TAKE 1 PILL BY MOUTH EVERY DAY/TXHUA HNUB NOJ 1 LUB [facility] PAB POBTXHA 90 tablet 3     lisinopril-hydrochlorothiazide (PRINZIDE,ZESTORETIC) 20-25 mg per tablet Take 1 tablet by mouth daily. 90 tablet 1     No facility-administered encounter medications on file as of 4/6/2017.        Objective / Physical Examination:  /84  Pulse 86  Temp 99  F  (37.2  C) (Oral)   Wt 158 lb 9.6 oz (71.9 kg)  SpO2 97%  BMI 30.97 kg/m2  Wt Readings from Last 3 Encounters:   04/06/17 158 lb 9.6 oz (71.9 kg)   03/21/17 156 lb (70.8 kg)   10/24/16 152 lb (68.9 kg)     Body mass index is 30.97 kg/(m^2). (>25?)    General Appearance: Alert and oriented in no acute distress  Head: atraumatic   Ears: Tympanic membrane clear with landmarks well visualized bilaterally  Eyes: PERRL  Neck: Supple  Back: Symmetrical and nontender  Cardiovascular: RRR S1, S2. No m/r/g  Extremities: Strength equal throughout.  Neuro: Alert and oriented, follows commands appropriately. Grossly normal. Gait normal     Assessment / Plan / Medical Decision Making:      Encounter Diagnoses   Name Primary?     Fall, initial encounter Yes     Benign Essential Hypertension      HLD (hyperlipidemia)      Cramp of both lower extremities         1. Fall, initial encounter  - Electrocardiogram Perform and Read    2. Benign Essential Hypertension  - Basic Metabolic Panel    3. HLD (hyperlipidemia)  - Lipid Cascade    4. Cramp of both lower extremities  - Magnesium    Fall at home. Exam normal. Vitals stable. EKG done today was unremarkable. Will check labs including lytes, magnesium and recheck his lipid panel. Stressed importance of hydration latricia with bp meds. If renal function is still down, may need further work up and possible change in medication.      Follow up in 1 month to establish care or sooner if needed    Total time spent with patient was 15 minutes with >50% of time spent in face-to-face counseling regarding the above plan     Troy Ovalles MD  [facility]

## 2021-06-09 NOTE — PROGRESS NOTES
[facility] Clinic Note  Patient Name: Colin Car  Patient Age: 82 y.o.  YOB: 1935  MRN: [id number]  ?  Date of Visit: 3/21/2017  Reason for Office Visit:   Chief Complaint   Patient presents with     Follow-up     BP         HPI: Colin Car 82 y.o. hmong male here with an  and his wife who presents with a history of HTN, hypothyroidism, HLD who presents to clinic for follow up blood pressure check. He was seen last fall and had his HCTZ increased to 25 mg due to poorly controlled HTN. He is also on lisinopril 20 mg daily     He has checked his blood pressure outside of clinic and those numbers range 140s/80s-90. He feels good. Tolerating medication well.     Denies dizziness, lightheadedness, cp, sob, edema, GI symptoms.        Review of Systems: As noted in HPI     Current Scheduled Meds:  Outpatient Encounter Prescriptions as of 3/21/2017   Medication Sig Dispense Refill     acetaminophen-codeine (TYLENOL #3) 300-30 mg per tablet Take 1-2 tablets by mouth as needed (every 4-6 hours).       aspirin 81 MG EC tablet Take 1 tablet (81 mg total) by mouth daily. TXHUA HNUB NOJ 1 LUB TSHUAJ PAB NTSHAV KHIAV ZOO 90 tablet 3     atorvastatin (LIPITOR) 40 MG tablet Take 1 tablet (40 mg total) by mouth bedtime. 90 tablet 0     calcium carbonate-vitamin D3 (CALTRATE 600 PLUS D3) 600 mg(1,500mg) -400 unit per tablet TAKE 1 PILL BY MOUTH EVERY DAY/TXHUA HNUB NOJ 1 LUB TSHUAJ PAB POBTXHA 90 tablet 3     [DISCONTINUED] hydroCHLOROthiazide (MICROZIDE) 12.5 mg capsule Take 1 capsule (12.5 mg total) by mouth daily. For high blood pressure 90 capsule 0     [DISCONTINUED] lisinopril (PRINIVIL,ZESTRIL) 20 MG tablet Take 1 tablet (20 mg total) by mouth daily. 90 tablet 0     lisinopril-hydrochlorothiazide (PRINZIDE,ZESTORETIC) 20-25 mg per tablet Take 1 tablet by mouth daily. 90 tablet 1     No facility-administered encounter medications on file as of 3/21/2017.        Objective / Physical  Examination:  Visit Vitals     /90     Pulse 72     Wt 156 lb (70.8 kg)     BMI 30.47 kg/m2     Wt Readings from Last 3 Encounters:   03/21/17 156 lb (70.8 kg)   10/24/16 152 lb (68.9 kg)   10/10/16 151 lb (68.5 kg)     Body mass index is 30.47 kg/(m^2). (>25?)    General Appearance: Alert and oriented in no acute distress  Neck:  No Thyromegaly   Lungs: Clear to auscultation bilaterally. Normal inspiratory and expiratory effort.   Cardiovascular: RRR S1, S2. No m/r/g  Extremities: jose edema  Integumentary: Warm and dry. Without suspicious looking lesions  Neuro: Alert and oriented, follows commands appropriately. Grossly normal. Cranial nerves II-XII intact and normal. Gait normal     Assessment / Plan / Medical Decision Making:      Encounter Diagnoses   Name Primary?     Benign Essential Hypertension Yes     Hypothyroidism         1. Benign Essential Hypertension    BP at goal < 150/90, given age and lack of co-morbidities. Tolerating medication. Will send new prescription for combined zestoric to make it easier for him. Discussed importance of lifestyle, getting a little exercise every day and diet. Will check BMP.     - Basic Metabolic Panel  - lisinopril-hydrochlorothiazide (PRINZIDE,ZESTORETIC) 20-25 mg per tablet; Take 1 tablet by mouth daily.  Dispense: 90 tablet; Refill: 1    2. Hypothyroidism  Per chart h/o hypothyroidism? He is not taking exogenous thyroid medication. TSH normal today. Clinically appears euthyroid  - Thyroid Stimulating Hormone (TSH)    Return in about 3 months (around 6/21/2017) for establish care . or earlier if needed.    Total time spent with patient was 15 minutes with >50% of time spent in face-to-face counseling regarding the above plan     Troy Ovalles MD  [facility]

## 2021-06-11 NOTE — TELEPHONE ENCOUNTER
Call patient: He is due for follow-up in the office and lab work. Please schedule for further refills.

## 2021-06-11 NOTE — TELEPHONE ENCOUNTER
RN cannot approve Refill Request    RN can NOT refill this medication med is not covered by policy/route to provider. Last office visit: 6/7/2018 Allison Belle FNP Last Physical: 5/29/2019 Last MTM visit: Visit date not found Last visit same specialty: 6/7/2018 Allison Belle FNP.  Next visit within 3 mo: Visit date not found  Next physical within 3 mo: Visit date not found      Rossi Yañez, Care Connection Triage/Med Refill 9/30/2020    Requested Prescriptions   Pending Prescriptions Disp Refills     calcium carbonate-vitamin D3 (CALTRATE 600 PLUS D3) 600 mg(1,500mg) -400 unit per tablet 90 tablet 3     Sig: TAKE 1 PILL BY MOUTH EVERY DAY/NORMA LAST NOJ 1 LUB TSHUAJ PAB POBTXHA       There is no refill protocol information for this order

## 2021-06-11 NOTE — PROGRESS NOTES
Internal Medicine Office Visit  Patient Name: Colin Car  Patient Age: 82 y.o.  YOB: 1935  MRN: [id number]  ?  Date of Visit: 2017  Reason for Office Visit:   Chief Complaint   Patient presents with     Establish Care     lab work       Assessment / Plan / Medical Decision Makin. HTN (hypertension)  2. Hypothyroidism  3. HLD (hyperlipidemia)  4. Benign Essential Hypertension  5. Hyperlipemia  6. Low, vision, both eyes  7. Hard of hearing, bilateral  8. Visual Impairment  - No changes to current medication regimen  - Declines PCV13 today  - Follow up in 6 months       Health Maintenance Review  Health Maintenance   Topic Date Due     DEPRESSION FOLLOW UP  1935     FALL RISK ASSESSMENT  2016     ADVANCE DIRECTIVES DISCUSSED WITH PATIENT  2017     INFLUENZA VACCINE RULE BASED (1) 2017     COLONOSCOPY  2019     TD 18+ HE  2021     PNEUMOCOCCAL POLYSACCHARIDE VACCINE AGE 65 AND OVER  Completed     PNEUMOCOCCAL CONJUGATE VACCINE FOR ADULTS (PCV13 OR PREVNAR)  Addressed     ZOSTER VACCINE  Addressed       I have discontinued Mr. Car's acetaminophen-codeine. I have also changed his atorvastatin. Additionally, I am having him maintain his aspirin, calcium carbonate-vitamin D3, and lisinopril-hydrochlorothiazide.     HPI:   Encounter Diagnoses   Name Primary?     HTN (hypertension) Yes     Hypothyroidism      HLD (hyperlipidemia)      Benign Essential Hypertension      Hyperlipemia      Low, vision, both eyes      Hard of hearing, bilateral      Visual Impairment       Colin Blake is an 81 y/o male who presents to the office today with his daughter-in-law and a professional  to establish care. He does not have any new concerns today.     HTN- well controlled with current regimen. He denies any side effects associated with lisinopril-HCT at this time.   HLD- triglycerides elevated with last check. He continues atorvastatin 40 mg daily. Regular  exercise and low cholesterol foods recommended.   Hypothyroidism (history)- euthyroid as of 3/2017. Denies any history of treatment for this, transient change only.   H/o Depression- states that mood has been good. He feels that if he is righteous his health will reflect his good actions.   Visual impairment- 1 year ago new rx for glasses, not effective. Has low vision in both eyes per patient.   Hearing impairment- declined hearing aids.    Colon polyps- colonoscopy done 11/2016, 3 year follow up recommended at that time    Review of Systems: If positive, the following ROS is bolded:  Constitutional: Fever, chills, night sweats fainting, unexplained weight change  Eyes: Visual changes, eye pain, double vision  HENT: Changes in hearing, ear pain, tinnitus, hoarseness, difficulty swallowing  Respiratory: Wheeze, shortness of breath, cough, and exercise intolerance   Cardiovascular: Chest pain, dyspnea, tachycardia, palpitations, syncope, dizziness or lightheadedness  Gastrointestinal: Nausea, vomiting, diarrhea, dyspepsia, irregular BMs, and melena   Genitourinary: Dysuria, frequency, hematuria, nocturia  Integumentary: Pruritis, rashes, lesions, wounds   Musculoskeletal: Muscular or joint pain, difficulty with range of motion  Neurological: Changes in balance, mental status, paresthesias, weakness, headache  Behavioral/Psych: Difficulty concentrating, excessive moodiness, depression or anxiety, insomnia    Current Scheduled Meds:  Outpatient Encounter Prescriptions as of 6/27/2017   Medication Sig Dispense Refill     aspirin 81 MG EC tablet Take 1 tablet (81 mg total) by mouth daily. TXFÁTIMAA SHALA NOJ 1 LUB [facility] KACY NTSHAV KHIAV ZOO 90 tablet 3     atorvastatin (LIPITOR) 40 MG tablet Take 1 tablet (40 mg total) by mouth at bedtime. 90 tablet 3     calcium carbonate-vitamin D3 (CALTRATE 600 PLUS D3) 600 mg(1,500mg) -400 unit per tablet TAKE 1 PILL BY MOUTH EVERY DAY/ONRMA CHASEUB NOJ 1 LUB [facility] KACY POBTXHA 90 tablet 3      lisinopril-hydrochlorothiazide (PRINZIDE,ZESTORETIC) 20-25 mg per tablet Take 1 tablet by mouth daily. 90 tablet 3     [DISCONTINUED] acetaminophen-codeine (TYLENOL #3) 300-30 mg per tablet Take 1-2 tablets by mouth as needed (every 4-6 hours).       [DISCONTINUED] atorvastatin (LIPITOR) 40 MG tablet TAKE 1 PILL (40 MG TOTAL) BY MOUTH EVERY NIGHT AT BEDTIME/ TXHUA HMO NOJ 1 LUB TSHUAJ THAUM MUS PW PAB NTSHAV MUAJ ROJ 90 tablet 0     [DISCONTINUED] lisinopril-hydrochlorothiazide (PRINZIDE,ZESTORETIC) 20-25 mg per tablet Take 1 tablet by mouth daily. 90 tablet 1     No facility-administered encounter medications on file as of 6/27/2017.      Past Medical History:   Diagnosis Date     Mild Recurrent Major Depression     Created by Conversion      History reviewed. No pertinent surgical history.  Social History   Substance Use Topics     Smoking status: Never Smoker     Smokeless tobacco: None     Alcohol use No       Objective / Physical Examination:  Vitals:    06/27/17 1548   BP: 136/72   Pulse: 64   Weight: 157 lb (71.2 kg)     Wt Readings from Last 3 Encounters:   06/27/17 157 lb (71.2 kg)   04/06/17 158 lb 9.6 oz (71.9 kg)   03/21/17 156 lb (70.8 kg)     Body mass index is 30.66 kg/(m^2).     General Appearance: Alert and oriented, cooperative, affect appropriate, speech clear, in no apparent distress  Neck: Supple, trachea midline. No carotid bruits  Lungs: Clear to auscultation bilaterally. Normal inspiratory and expiratory effort  Cardiovascular: Regular rate, normal S1, S2  Extremities: No edema    No orders of the defined types were placed in this encounter.  Followup: Return in about 6 months (around 12/27/2017) for Recheck. earlier if needed.    Total time spent with patient was 30 minutes with >50% of time spent in face-to-face counseling regarding the above plan       [facility]

## 2021-06-13 NOTE — PROGRESS NOTES
Internal Medicine Office Visit  Patient Name: Colin Car  Patient Age: 82 y.o.  YOB: 1935  MRN: [id number]  ?  Date of Visit: 2017  Reason for Office Visit:   Chief Complaint   Patient presents with     Hospital Visit Follow Up       Assessment / Plan / Medical Decision Makin. Rib sprain  - Reviewed ER visit, labs, imaging results  - He was advised to splint the painful area if coughing. Avoid bending/twisting motions for the next week or so. May use acetaminophen as needed but he has noticed improvement in his pain already   - Follow up as needed if pain worsening or fails to resolve       Health Maintenance Review  Health Maintenance   Topic Date Due     DEPRESSION FOLLOW UP  1935     FALL RISK ASSESSMENT  2016     ADVANCE DIRECTIVES DISCUSSED WITH PATIENT  2017     COLONOSCOPY  2019     TD 18+ HE  2021     PNEUMOCOCCAL POLYSACCHARIDE VACCINE AGE 65 AND OVER  Completed     INFLUENZA VACCINE RULE BASED  Completed     PNEUMOCOCCAL CONJUGATE VACCINE FOR ADULTS (PCV13 OR PREVNAR)  Addressed     ZOSTER VACCINE  Addressed           I am having Mr. Car start on acetaminophen. I am also having him maintain his aspirin, lisinopril-hydrochlorothiazide, atorvastatin, and calcium carbonate-vitamin D3.     HPI:   Encounter Diagnoses   Name Primary?     Rib sprain Yes        Colin Car is an 81 y/o male who presents to the office today with his daughter and professional  for follow up of a recent ER visit.     Yesterday he felt in his usual state of health and then was bending over and coughed at the same time, felt a sudden pain in the RLQ. It was so uncomfortable that he could not stand up or sit comfortably. He went to the ER for evaluation; this was essentially a normal evaluation and he was discharged to home. He feels better today, pain has improved greatly but not resolved entirely. If he bears down he can feel the discomfort in the right  lateral rib area but not as severe as yesterday. He can eat without difficulty and food does not change the symptoms. He has not taken any pain medications at home.       Review of Systems: Denies nausea, vomiting, pain with eating/drinking. No fevers or chills       Current Scheduled Meds:  Outpatient Encounter Prescriptions as of 2017   Medication Sig Dispense Refill     aspirin 81 MG EC tablet Take 1 tablet (81 mg total) by mouth daily. TXHUA HNUB NOJ 1 LUB TSHUAJ PAB NTSHAV KHIAV ZOO 90 tablet 3     atorvastatin (LIPITOR) 40 MG tablet Take 1 tablet (40 mg total) by mouth at bedtime. 90 tablet 3     calcium carbonate-vitamin D3 (CALCIUM 600 + D,3,) 600 mg(1,500mg) -400 unit per tablet Take 1 tablet by mouth daily.       lisinopril-hydrochlorothiazide (PRINZIDE,ZESTORETIC) 20-25 mg per tablet Take 1 tablet by mouth daily. 90 tablet 3     acetaminophen (TYLENOL EXTRA STRENGTH) 500 MG tablet Take 1 tablet (500 mg total) by mouth every 4 (four) hours as needed for pain. 30 tablet 0     [] hydrALAZINE injection 10 mg (APRESOLINE)        [] iohexol 350 mg iodine/mL injection 100 mL (OMNIPAQUE)        [] morphine injection 4 mg        [DISCONTINUED] naloxone injection 0.2-0.4 mg (NARCAN)        [DISCONTINUED] naloxone injection 0.2-0.4 mg (NARCAN)        No facility-administered encounter medications on file as of 2017.      Past Medical History:   Diagnosis Date     Hyperlipidemia      Hypertension      Hypothyroidism      Mild Recurrent Major Depression     Created by Conversion      Past Surgical History:   Procedure Laterality Date     NO PAST SURGERIES       Social History   Substance Use Topics     Smoking status: Never Smoker     Smokeless tobacco: None     Alcohol use No       Objective / Physical Examination:  Vitals:    17 1553   BP: 114/60   Pulse: 72   Weight: 158 lb (71.7 kg)     Wt Readings from Last 3 Encounters:   17 158 lb (71.7 kg)   17 157 lb (71.2 kg)    04/06/17 158 lb 9.6 oz (71.9 kg)     Body mass index is 30.86 kg/(m^2).     Study Result   CT ABDOMEN PELVIS WO ORAL W IV CONTRAST  9/19/2017 12:07 AM      INDICATION: RLQ abd pain - ? appendicitis  TECHNIQUE: CT abdomen and pelvis. Multiplanar reformation images (MPR). Dose reduction techniques were used.   IV CONTRAST: Iohexol (Omni) 100 mL  COMPARISON: None.     FINDINGS:  LUNG BASES: Right middle lobe atelectasis or scarring. No basilar consolidation. Heart size within normal limits.     ABDOMEN: Hepatic steatosis. Normal gallbladder, spleen, adrenals, and pancreas. Bilateral renal cysts measuring up to 10.2 cm at the left lower pole and 2.9 cm at the right midpole. No hydronephrosis on either side.     Mild aortoiliac atherosclerotic change. No aneurysm.     No bowel obstruction. No free air. Appendix at upper limits normal for diameter, with no periappendiceal inflammatory change to suggest appendicitis.     PELVIS: Prostatic calcifications. Mildly distended urinary bladder. No free fluid or adenopathy.     MUSCULOSKELETAL: Mild osteopenia. No acute osseous findings.     IMPRESSION:   CONCLUSION:  1.  Appendix at upper limits normal for diameter, with no secondary signs to suggest acute appendicitis.     2.  Hepatic steatosis.     3.  Bilateral renal cysts.     4.  Mildly distended, otherwise unremarkable bladder.         General Appearance: Alert and oriented, cooperative, affect appropriate, speech clear, in no apparent distress  Lungs: Clear to auscultation bilaterally. Normal inspiratory and expiratory effort  Cardiovascular: Regular rate, normal S1, S2  Abdomen: Bowel sounds active all four quadrants. Soft, non-tender. No hepatomegaly or splenomegaly. No bruits detected.   MSK: right lateral lower floating ribs tender to palpation. No step-offs or bruising noted         No orders of the defined types were placed in this encounter.  Followup: Return if symptoms worsen or fail to improve. earlier if  needed.      Allison Belle, CNP  [facility]

## 2021-06-13 NOTE — TELEPHONE ENCOUNTER
RN cannot approve Refill Request    RN can NOT refill this medication Protocol failed and NO refill given. Last office visit: 6/7/2018 Allison Belle FNP Last Physical: 5/29/2019 Last MTM visit: Visit date not found Last visit same specialty: 6/7/2018 Allison Belle FNP.  Next visit within 3 mo: Visit date not found  Next physical within 3 mo: Visit date not found      Rossi Yañez, [facility] Triage/Med Refill 12/10/2020    Requested Prescriptions   Pending Prescriptions Disp Refills     lisinopriL-hydrochlorothiazide (PRINZIDE,ZESTORETIC) 20-25 mg per tablet [Pharmacy Med Name: LISINOPRIL-HCTZ 20-25 MG TA 20-25 Tablet] 30 tablet 0     Sig: TAKE 1 TABLET BY MOUTH DAILY/ TXHUA HNUB NOJ 1 LUB [facility] NTSHAV SIAB. **DUE FOR APPOINTMENT**       Diuretics/Combination Diuretics Refill Protocol  Failed - 12/9/2020  2:20 PM        Failed - Visit with PCP or prescribing provider visit in past 12 months     Last office visit with prescriber/PCP: 6/7/2018 Allison Belle FNP OR same dept: Visit date not found OR same specialty: 6/7/2018 Allison Belle FNP  Last physical: 5/29/2019 Last MTM visit: Visit date not found   Next visit within 3 mo: Visit date not found  Next physical within 3 mo: Visit date not found  Prescriber OR PCP: KATE Segura  Last diagnosis associated with med order: 1. Essential hypertension  - lisinopriL-hydrochlorothiazide (PRINZIDE,ZESTORETIC) 20-25 mg per tablet [Pharmacy Med Name: LISINOPRIL-HCTZ 20-25 MG TA 20-25 Tablet]; TAKE 1 TABLET BY MOUTH DAILY/ TXHUA HNUB NOJ 1 [facility] SI[facility]. **DUE FOR APPOINTMENT**  Dispense: 30 tablet; Refill: 0    If protocol passes may refill for 12 months if within 3 months of last provider visit (or a total of 15 months).             Failed - Serum Potassium in past 12 months      No results found for: LN-POTASSIUM          Failed - Serum Sodium in past 12 months      No results found for: LN-SODIUM           Failed - Blood pressure on file in past 12 months     BP Readings from Last 1 Encounters:   05/29/19 122/72             Failed - Serum Creatinine in past 12 months      Creatinine   Date Value Ref Range Status   05/29/2019 1.38 (H) 0.70 - 1.30 mg/dL Final

## 2021-06-14 NOTE — PROGRESS NOTES
"Sent 01/12/21:    Please call the patient with the following message, and offer to send a letter with my message and their results printed if they would like. **Please let me know if patient willing to do additional labs so I can place orders and you can schedule f/u lab visit** If unable to reach, please send a letter with the following message along with their lab results from their most recent encounter with me. Thank you- Dr. Brown    \"Shin Shaw,    It was nice to meet you the other day. Your labs are back. Your electrolytes, kidney function, and liver tests are stable. 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 We can just monitor this at least yearly moving forward.    Your blood counts show that your hemoglobin which are the blood cells that carry oxygen around the body is high. It has been high in the past. I would like you to come in for additional blood testing to figure out what is going on. Sometimes this can be a sign of an underlying medical problem. Please let me know if you would be agreeable to additional testing and we can set you up for a lab only visit.    Please let me know if you any questions or concerns,  Dr. Brown\"    "

## 2021-06-14 NOTE — TELEPHONE ENCOUNTER
"----- Message from Joceline Beck MD sent at 1/12/2021  9:50 AM CST -----  Please call the patient with the following message, and offer to send a letter with my message and their results printed if they would like. ##Please let me know if patient willing to do additional labs so I can place orders and you can schedule f/u lab visit## If unable to reach, please send a letter with the following message along with their lab results from their most recent encounter with me. Thank you- Dr. Brown    \"Shin Shaw,    It was nice to meet you the other day. Your labs are back. Your electrolytes, kidney function, and liver tests are stable. 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 We can just monitor this at least yearly moving forward.    Your blood counts show that your hemoglobin which are the blood cells that carry oxygen around the body is high. It has been high in the past. I would like you to come in for additional blood testing to figure out what is going on. Sometimes this can be a sign of an underlying medical problem. Please let me know if you would be agreeable to additional testing and we can set you up for a lab only visit.    Please let me know if you any questions or concerns,  Dr. Brown\"  "

## 2021-06-14 NOTE — TELEPHONE ENCOUNTER
Using  left message for pt to call back. Please relay message in previous message.     Letter sent via mail.

## 2021-06-14 NOTE — PATIENT INSTRUCTIONS - HE
Go to the lab today. We will notify you with the results once those are available    Someone will call your for audiology (hearing) appointment.    Someone will call to schedule the CT of your chest.

## 2021-06-14 NOTE — PROGRESS NOTES
[facility]  Internal Medicine - Office Visit    Patient: Colin Car   MRN: [id number]   Date of Service: 01/08/21   Patient Care Team:  Allison Belle FNP as PCP - General (Nurse Practitioner)  Allison Belle FNP as Assigned PCP    ASSESSMENT/PLAN     Colin Car is here for follow-up for chronic medical problems.    Diagnoses and all orders for this visit:    Essential hypertension  Well-controlled on lisinopril-hydrochlorothiazide.  We will check labs today.  -     lisinopriL-hydrochlorothiazide (PRINZIDE,ZESTORETIC) 20-25 mg per tablet; Take 1 tablet by mouth daily. TXHUA HNUB NOJ 1 LUB TSHUAJ PAB ZOO NTSHAV SIAB. Due for appointment  Dispense: 30 tablet; Refill: 0  -     Comprehensive Metabolic Panel  -     HM1(CBC and Differential)    Mixed hyperlipidemia  He is tolerating statin well.  -     atorvastatin (LIPITOR) 40 MG tablet; TAKE 1 TABLET (40 MG TOTAL) BY MOUTH AT BEDTIME/ TXA O NOJ 1 LUB TSHUAJ THAUM MUS PW PAB ZOO NTSHAV MUAJ ROJ  Dispense: 90 tablet; Refill: 3    Subclinical hypothyroidism  History of subclinical hypothyroidism in his age, chose not to treat.  We will continue to monitor.  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  Interestingly he also had mediastinal and hilar adenopathy on that CT that needs follow-up on.  He has no symptoms.  -     CTA Chest; Future; Expected date: 01/08/2021    Bilateral hearing loss, unspecified hearing loss type  -     Ambulatory referral to Audiology - [place]    Blurred vision  Chronic. He would prefer to hold off on ophthalmology appointment at this time.    Return to clinic in 1 year for follow up.    Joceline Beck MD  Internal Medicine and Pediatrics  [facility]    SUBJECTIVE     Colin Car is here today. History is obtained with an  over the phone.    Patient is here  for medication check.    He is on lisinopril-hydrochlorothiazide once a day for his high blood pressure.  He does not check his blood pressure regularly at home, but his blood pressure is under control here today.    He is also on Lipitor which she takes daily.    He does not have any side effects with either of these medications.    He has history of a thoracoabdominal aortic aneurysm, last measuring 4.5 cm in 2019.  Incidentally on the last scan, there was mention of mild mediastinal and hilar adenopathy with a recommendation to follow-up in 6 months.  He denies any chest pain or any shortness of breath.    He does have hearing loss.  In the past he was referred on to a hearing specialist, but he reports that was a very long time ago.  He is interested in hearing aids if he would qualify.    He also has issues with blurry vision.  He would be interested in seeing an eye doctor, but not right now.    He lives with his son, daughter-in-law, and their children.  He is .  He has 3 children total. They all live here in [place].    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 CTA chest 6/1/2018 06/07/2018     Total bilirubin, elevated 06/01/2018     Leukocytosis, unspecified type 06/01/2018     Choledocholithiasis 06/01/2018     JOSÉ MIGUEL (acute kidney injury) (H) 06/01/2018     Bilateral hearing loss 07/04/2017     Overweight  03/02/2015     Subclinical hypothyroidism      HLD (hyperlipidemia)      HTN (hypertension)      Lumbago      Visual Impairment        Past Medical History:   Diagnosis Date     Hyperlipidemia      Hypertension      Hypothyroidism      Mild Recurrent Major Depression     Created by Conversion        Past Surgical History:   Procedure Laterality Date     ERCP N/A 6/2/2018    Procedure: ENDOSCOPIC RETROGRADE CHOLANGIOPANCREATOGRAPHY; SPHINCTEROTOMY AND STONE  EXTRACTION;  Surgeon: Juve Shannon MD;  [place] [facility] [place]  Service:      LAPAROSCOPIC CHOLECYSTECTOMY N/A 6/1/2018    Procedure: CHOLECYSTECTOMY, LAPAROSCOPIC, COMMON DUCT EXPLORATION;  Surgeon: Meir Beck MD;  [place] [facility] [place]  Service:      NO PAST SURGERIES         Current Outpatient Medications   Medication Sig Dispense Refill     acetaminophen (TYLENOL EXTRA STRENGTH) 500 MG tablet Take 1 tablet (500 mg total) by mouth every 4 (four) hours as needed for pain. 30 tablet 0     aspirin 81 MG EC tablet TAKE 1 PILL (81 MG TOTAL) BY MOUTH EVERY DAY/ TXA HNUB NOJ 1 LUB [facility] NTSHAV KHIAV ZOO. 90 tablet 3     atorvastatin (LIPITOR) 40 MG tablet Take 1 tablet (40 mg total) by mouth at bedtime. 90 tablet 3     atorvastatin (LIPITOR) 40 MG tablet TAKE 1 TABLET (40 MG TOTAL) BY MOUTH AT BEDTIME/ TXA HMO NOJ 1 LUB [facility] THAUM MUS  PAB ZOO NTSHAV MUAJ ROJ 90 tablet 3     calcium carbonate-vitamin D3 (CALTRATE 600 PLUS D3) 600 mg(1,500mg) -400 unit per tablet TAKE 1 PILL BY MOUTH EVERY DAY/TXA HNUB NOJ 1 LUB [facility] PAB POBTXHA 90 tablet 0     lisinopriL-hydrochlorothiazide (PRINZIDE,ZESTORETIC) 20-25 mg per tablet Take 1 tablet by mouth daily. TXHUA HNUB NOJ 1 LUB [facility] ZOO NTSHAV SIAB. Due for appointment 30 tablet 0     polyethylene glycol (GLYCOLAX) 17 gram/dose powder Take 17 g by mouth daily as needed (constipation). 510 g 5     No current facility-administered medications for this visit.        No Known Allergies    Family History   Problem Relation Age of Onset     No Medical Problems Mother      No Medical Problems Father      No Medical Problems Brother      No Medical Problems Brother        Social History     Tobacco Use     Smoking status: Never Smoker     Smokeless tobacco: Never Used   Substance Use Topics     Alcohol use: No        Social History     Substance and Sexual Activity   Drug Use No         OBJECTIVE           /84 (Patient Site: Right Arm,  Patient Position: Sitting, Cuff Size: Adult Regular)   Pulse 86   Resp 16   Wt 152 lb 9.6 oz (69.2 kg)   BMI 29.80 kg/m    Physical Exam    GENERAL APPEARANCE: healthy, alert and no distress     EYES: EOMI     HENT: ear canals and TM's normal and nose and mouth without ulcers or lesions     RESP: lungs clear to auscultation - no rales, rhonchi or wheezes     CV: regular rates and rhythm, normal S1 S2, no S3 or S4 and no murmur, click or rub     ABDOMEN:  soft, nontender     MS: extremities normal- no gross deformities noted, no evidence of inflammation in joints, FROM in all extremities.     NEURO: Normal strength and tone, sensory exam grossly normal, mentation intact and speech normal     PSYCH: mentation appears normal. and affect normal/bright      Labs/imaging/studies:  See above

## 2021-06-14 NOTE — TELEPHONE ENCOUNTER
Using  left message for pt to call back. Please relay message from Dr. Brown:     It was nice to meet you the other day. Your labs are back. Your electrolytes, kidney function, and liver tests are stable. 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 We can just monitor this at least yearly moving forward.     Your blood counts show that your hemoglobin which are the blood cells that carry oxygen around the body is high. It has been high in the past. I would like you to come in for additional blood testing to figure out what is going on. Sometimes this can be a sign of an underlying medical problem. Please let me know if you would be agreeable to additional testing and we can set you up for a lab only visit.     Please let me know if you any questions or concerns,

## 2021-06-14 NOTE — PROGRESS NOTES
Internal Medicine Office Visit  [facility]  Patient Name: Colin Car  Patient Age: 82 y.o.  YOB: 1935  MRN: [id number]    Date of Visit: 2017  Reason for Office Visit:   Chief Complaint   Patient presents with     Follow-up           Assessment / Plan / Medical Decision Makin. HTN (hypertension)  2. Hypothyroidism  3. HLD (hyperlipidemia)  - Continue current medication  - Recommended a pneumonia vaccine, he declines and is not interested in getting this vaccine at all  - Depression is in remission  - Follow up in 6 months with lab work at this time       Health Maintenance Review  Health Maintenance   Topic Date Due     DEPRESSION FOLLOW UP  1935     FALL RISK ASSESSMENT  2016     ADVANCE DIRECTIVES DISCUSSED WITH PATIENT  2017     COLONOSCOPY  2019     TD 18+ HE  2021     PNEUMOCOCCAL POLYSACCHARIDE VACCINE AGE 65 AND OVER  Completed     INFLUENZA VACCINE RULE BASED  Completed     PNEUMOCOCCAL CONJUGATE VACCINE FOR ADULTS (PCV13 OR PREVNAR)  Addressed     ZOSTER VACCINE  Addressed         I am having Mr. Car maintain his lisinopril-hydrochlorothiazide, atorvastatin, calcium carbonate-vitamin D3, acetaminophen, aspirin, and calcium carbonate-vitamin D3.      HPI:  Colin Car is a 82 y.o. year old who presents to the office today for follow up.     HTN- blood pressure is well controlled with current medication. No lightheadedness or dizziness associated with treatment.     He has a history of depression which has been in remission. He states that his mood has been good and he does not have any concerns regarding this.     HLD- He is taking a statin for this, denies myalgias or weakness associated with treatment.     He has a history of subclinical hypothyroidism, not currently taking a medication for this. Will continue to monitor TSH annually    Review of Systems- pertinent positive in bold:  As in HPI. He  denies any chest pain or shortness of breath unless he is really exerting himself and states this is not new    Current Scheduled Meds:  Outpatient Encounter Prescriptions as of 12/20/2017   Medication Sig Dispense Refill     acetaminophen (TYLENOL EXTRA STRENGTH) 500 MG tablet Take 1 tablet (500 mg total) by mouth every 4 (four) hours as needed for pain. 30 tablet 0     aspirin 81 MG EC tablet TAKE 1 TABLET (81 MG TOTAL) BY MOUTH DAILY. VINICIOA SHALA NOJ 1 LUB [facility] NTSHAV KHIAV ZOO 90 tablet 3     atorvastatin (LIPITOR) 40 MG tablet Take 1 tablet (40 mg total) by mouth at bedtime. 90 tablet 3     calcium carbonate-vitamin D3 (CALCIUM 600 + D,3,) 600 mg(1,500mg) -400 unit per tablet Take 1 tablet by mouth daily.       calcium carbonate-vitamin D3 (CALTRATE 600 PLUS D3) 600 mg(1,500mg) -400 unit per tablet TAKE 1 PILL BY MOUTH EVERY DAY/NORMA LAST NOJ 1 LUB [facility] POBTXHA 90 tablet 3     lisinopril-hydrochlorothiazide (PRINZIDE,ZESTORETIC) 20-25 mg per tablet Take 1 tablet by mouth daily. 90 tablet 3     No facility-administered encounter medications on file as of 12/20/2017.      Past Medical History:   Diagnosis Date     Hyperlipidemia      Hypertension      Hypothyroidism      Mild Recurrent Major Depression     Created by Conversion      Past Surgical History:   Procedure Laterality Date     NO PAST SURGERIES       Social History   Substance Use Topics     Smoking status: Never Smoker     Smokeless tobacco: None     Alcohol use No       Objective / Physical Examination:  Vitals:    12/20/17 1624   BP: 126/74   Pulse: 82   Weight: 157 lb (71.2 kg)     Wt Readings from Last 3 Encounters:   12/20/17 157 lb (71.2 kg)   09/19/17 158 lb (71.7 kg)   06/27/17 157 lb (71.2 kg)     Body mass index is 30.66 kg/(m^2).     General Appearance: Alert and oriented, cooperative, affect appropriate, speech clear, in no apparent distress  Neck: No carotid bruits   Lungs: Clear to auscultation bilaterally. Normal inspiratory  and expiratory effort  Cardiovascular: Regular rate, normal S1, S2. No murmurs, rubs, or gallops  Extremities: DP pulses 2+. No edema.    No orders of the defined types were placed in this encounter.  Followup: Return in about 6 months (around 6/20/2018) for Next scheduled follow up. earlier if needed.    Allison Belle, CNP

## 2021-06-15 NOTE — TELEPHONE ENCOUNTER
----- Message from Ethan Shearer sent at 3/12/2021  8:27 AM CST -----  Regarding: ENT visit and audio  Hello!     Can you please call patient to schedule another audio and ENT visit? We need to do another hearing test with an in person  so the same day would be best (ENT visit with audiology, office visit with ENT).     Please let me know when it gets scheduled and I can request an in-person !      Thank you!!    Jessy

## 2021-06-15 NOTE — PROGRESS NOTES
Internal Medicine Office Visit  [facility]  Patient Name: Colin Car  Patient Age: 82 y.o.  YOB: 1935  MRN: [id number]    Date of Visit: 2018  Reason for Office Visit:   Chief Complaint   Patient presents with     Hospital Visit Follow Up           Assessment / Plan / Medical Decision Makin. Rectal bleeding  - Hemoglobin today  - ER/hospital notes and labs reviewed   - We had a discussion today regarding follow up of the episode of rectal bleeding. A recurrent bleeding polyp cannot be ruled out, however, this could also be due to a fissure or bleeding benign hemorrhoid. His last colonoscopy was 14 months ago and reportedly normal. After shared decision making, he would like to employ watchful waiting and defer a sigmoidoscopy procedure. He will monitor stools for sudden change in color/consistency and report any future episodes of rectal bleeding. We will recheck hemoglobin today to ensure stability.       Health Maintenance Review  Health Maintenance   Topic Date Due     DEPRESSION FOLLOW UP  1935     FALL RISK ASSESSMENT  2016     ADVANCE DIRECTIVES DISCUSSED WITH PATIENT  2017     COLONOSCOPY  2019     TD 18+ HE  2021     PNEUMOCOCCAL POLYSACCHARIDE VACCINE AGE 65 AND OVER  Completed     INFLUENZA VACCINE RULE BASED  Completed     PNEUMOCOCCAL CONJUGATE VACCINE FOR ADULTS (PCV13 OR PREVNAR)  Addressed     ZOSTER VACCINE  Addressed         I am having Mr. Car maintain his lisinopril-hydrochlorothiazide, atorvastatin, acetaminophen, and polyethylene glycol.      HPI:  Colin Car is a 82 y.o. year old who presents to the office today with a professional  for hospitalization follow up. He was seen in the ED on  with a complaint of rectal bleeding. He had several episodes of bright red blood in the toilet 24 hours prior to arrival. During the hospitalization hemoglobin was stable and bleeding  resolved without intervention. GI was consulted and recommended a flex sig outpatient due to concern for recurrent polyp. His last colonoscopy was 14 months ago and showed 1 polyp. Since the ER visit he has felt well and has not had any blood in his stool.     Review of Systems- pertinent positive in bold:  Denies fatigue, shortness of breath out of ordinary for patient, no abdominal/rectal pain    Current Scheduled Meds:  Outpatient Encounter Prescriptions as of 1/4/2018   Medication Sig Dispense Refill     acetaminophen (TYLENOL EXTRA STRENGTH) 500 MG tablet Take 1 tablet (500 mg total) by mouth every 4 (four) hours as needed for pain. 30 tablet 0     atorvastatin (LIPITOR) 40 MG tablet Take 1 tablet (40 mg total) by mouth at bedtime. 90 tablet 3     lisinopril-hydrochlorothiazide (PRINZIDE,ZESTORETIC) 20-25 mg per tablet Take 1 tablet by mouth daily. 90 tablet 3     polyethylene glycol (GLYCOLAX) 17 gram/dose powder Take 17 g by mouth daily as needed (constipation).  0     No facility-administered encounter medications on file as of 1/4/2018.      Past Medical History:   Diagnosis Date     Hyperlipidemia      Hypertension      Hypothyroidism      Mild Recurrent Major Depression     Created by Conversion      Past Surgical History:   Procedure Laterality Date     NO PAST SURGERIES       Social History   Substance Use Topics     Smoking status: Never Smoker     Smokeless tobacco: None     Alcohol use No       Objective / Physical Examination:  Vitals:    01/04/18 1419   BP: 122/66   Pulse: 78   Weight: 160 lb (72.6 kg)     Wt Readings from Last 3 Encounters:   01/04/18 160 lb (72.6 kg)   12/31/17 155 lb 3.2 oz (70.4 kg)   12/20/17 157 lb (71.2 kg)     Body mass index is 30.23 kg/(m^2).     General Appearance: Alert and oriented, cooperative, affect appropriate, speech clear, in no apparent distress  Eyes: No conjunctival pallor  Lungs: Clear to auscultation bilaterally. Normal inspiratory and expiratory  effort  Cardiovascular: Regular rate, normal S1, S2  Abdomen: Bowel sounds active all four quadrants. Soft, non-tender      Orders Placed This Encounter   Procedures     Hemoglobin   Followup: Return if symptoms worsen or fail to improve. earlier if needed.      Allison Belle, CNP

## 2021-06-16 PROBLEM — K80.50 CHOLEDOCHOLITHIASIS: Status: ACTIVE | Noted: 2018-06-01

## 2021-06-16 PROBLEM — H91.93 BILATERAL HEARING LOSS: Status: ACTIVE | Noted: 2017-07-04

## 2021-06-16 PROBLEM — N17.9 AKI (ACUTE KIDNEY INJURY) (H): Status: ACTIVE | Noted: 2018-06-01

## 2021-06-16 PROBLEM — H53.8 BLURRED VISION: Status: ACTIVE | Noted: 2021-01-08

## 2021-06-16 PROBLEM — D72.829 LEUKOCYTOSIS, UNSPECIFIED TYPE: Status: ACTIVE | Noted: 2018-06-01

## 2021-06-16 PROBLEM — D58.2 ELEVATED HEMOGLOBIN (H): Status: ACTIVE | Noted: 2021-01-12

## 2021-06-16 PROBLEM — I71.60 THORACOABDOMINAL AORTIC ANEURYSM (TAAA) WITHOUT RUPTURE (H): Status: ACTIVE | Noted: 2018-06-07

## 2021-06-16 PROBLEM — R17 TOTAL BILIRUBIN, ELEVATED: Status: ACTIVE | Noted: 2018-06-01

## 2021-06-17 NOTE — PATIENT INSTRUCTIONS - HE
Patient Instructions by Allison Belle FNP at 5/29/2019  9:05 AM     Author: Allison Belle FNP Service: -- Author Type: Nurse Practitioner    Filed: 5/29/2019  9:33 AM Encounter Date: 5/29/2019 Status: Addendum    : Allison Belle FNP (Nurse Practitioner)    Related Notes: Original Note by Allison Belle FNP (Nurse Practitioner) filed at 5/29/2019  9:22 AM         Patient Education     Exercise for a Healthier Heart  You may wonder how you can improve the health of your heart. If youre thinking about exercise, youre on the right track. You dont need to become an athlete, but you do need a certain amount of brisk exercise to help strengthen your heart. If you have been diagnosed with a heart condition, your doctor may recommend exercise to help stabilize your condition. To help make exercise a habit, choose safe, fun activities.       Be sure to check with your health care provider before starting an exercise program.    Why exercise?  Exercising regularly offers many healthy rewards. It can help you do all of the following:    Improve your blood cholesterol levels to help prevent further heart trouble    Lower your blood pressure to help prevent a stroke or heart attack    Control diabetes, or reduce your risk of getting this disease    Improve your heart and lung function    Reach and maintain a healthy weight    Make your muscles stronger and more limber so you can stay active    Prevent falls and fractures by slowing the loss of bone mass (osteoporosis)    Manage stress better  Exercise tips  Ease into your routine. Set small goals. Then build on them.  Exercise on most days. Aim for a total of 150 or more minutes of moderate to  vigorous intensity activity each week. Consider 40 minutes, 3 to 4 times a week. For best results, activity should last for 40 minutes on average. It is OK to work up to the 40 minute period over time. Examples of moderate-intensity activity is walking one  mile in 15 minutes or 30 to 45 minutes of yard work.  Step up your daily activity level. Along with your exercise program, try being more active throughout the day. Walk instead of drive. Do more household tasks or yard work.  Choose one or more activities you enjoy. Walking is one of the easiest things you can do. You can also try swimming, riding a bike, or taking an exercise class.  Stop exercising and call your doctor if you:    Have chest pain or feel dizzy or lightheaded    Feel burning, tightness, pressure, or heaviness in your chest, neck, shoulders, back, or arms    Have unusual shortness of breath    Have increased joint or muscle pain    Have palpitations or an irregular heartbeat      5030-1278 [employer]. [address] [place] [address]. All rights reserved. This information is not intended as a substitute for professional medical care. Always follow your healthcare professional's instructions.         Patient Education   Activities of Daily Living  Your Health Risk Assessment indicates you have difficulties with activities of daily living such as eating, getting dressed, grooming, bathing, walking, or using the toilet. Please make a follow up appointment for us to address this issue in more detail.     Patient Education   Instrumental Activities of Daily Living  Your Health Risk Assessment indicates you have difficulties with instrumental activities of daily living which include laundry, housekeeping, banking, shopping, using the telephone, food preparation, transportation, or taking your own medications. Please make a follow up appointment for us to address this issue in more detail.    [employer] has resources available on the following website: [email or web address]     Also, here is a local agency that provides help with meals and other assistance:   [facility] Line: [phone]     Patient Education   Signs of Hearing Loss  Hearing loss is a  problem shared by many people. In fact, it is one of the most common health conditions, particularly as people age. Most people over age 65 have some hearing loss, and by age 80, almost everyone does. Because hearing loss usually occurs slowly over the years, you may not realize your hearing ability has gotten worse.       Have your hearing checked  Contact your [facility] care provider if you:    Have to strain to hear normal conversation.    Have to watch other peoples faces very carefully to follow what theyre saying.    Need to ask people to repeat what theyve said.    Often misunderstand what people are saying.    Turn the volume of the television or radio up so high that others complain.    Feel that people are mumbling when theyre talking to you.    Find that the effort to hear leaves you feeling tired and irritated.    Notice, when using the phone, that you hear better with 1 ear than the other.    0747-2717 [employer]. [address] [place] [address]. All rights reserved. This information is not intended as a substitute for professional medical care. Always follow your healthcare professional's instructions.         Patient Education   Understanding Advance Care Planning  Advance care planning is the process of deciding ones own future medical care. It helps ensure that if you cant speak for yourself, your wishes can still be carried out. The plan is a series of legal documents that note a persons wishes. The documents vary by state. Advance care planning may be done when a person has a serious illness that is expected to get worse. It may be done before major surgery. And it can help you and your family be prepared in case of a major illness or injury. Advance care planning helps with making decisions at these times.       A health care proxy is a person who acts as the voice of a patient when the patient cant speak for himself or herself. The name of this role varies by state. It may  be called a Durable Medical Power of  or Durable Power of  for Healthcare. It may be called an agent, surrogate, or advocate. Or it may be called a representative or decision maker. It is an official duty that is identified by a legal document. The document also varies by state.    Why Is Advance Care Planning Important?  If a person communicates their healthcare wishes:    They will be given medical care that matches their values and goals.    Their family members will not be forced to make decisions in a crisis with no guidance.  Creating a Plan  Making an advance care plan is often done in 3 steps:    Thinking about ones wishes. To create an advance care plan, you should think about what kind of medical treatment you would want if you lose the ability to communicate. Are there any situations in which you would refuse or stop treatment? Are there therapies you would want or not want? And whom do you want to make decisions for you? There are many places to learn more about how to plan for your care. Ask your doctor or  for resources.    Picking a health care proxy. This means choosing a trusted person to speak for you only when you cant speak for yourself. When you cannot make medical decisions, your proxy makes sure the instructions in your advance care plan are followed. A proxy does not make decisions based on his or her own opinions. They must put aside those opinions and values if needed, and carry out your wishes.    Filling out the legal documents. There are several kinds of legal documents for advance care planning. Each one tells health care providers your wishes. The documents may vary by state. They must be signed and may need to be witnessed or notarized. You can cancel or change them whenever you wish. Depending on your state, the documents may include a Healthcare Proxy form, Living Will, Durable Medical Power of , Advance Directive, or others.  The Familys  Role  The best help a family can give is to support their loved ones wishes. Open and honest communication is vital. Family should express any concerns they have about the patients choices while the patient can still make decisions.    8583-0227 [employer]. [address] [place] [address], [place] [address]. All rights reserved. This information is not intended as a substitute for professional medical care. Always follow your healthcare professional's instructions.         Also, [facility] offers a free, downloadable health care directive that allows you to share your treatment choices and personal preferences if you cannot communicate your wishes. It also allows you to appoint another person (called a health care agent) to make health care decisions if you are unable to do so. You can download an advance directive by going here: [email or web address]     Patient Education   Personalized Prevention Plan  You are due for the preventive services outlined below.  Your care team is available to assist you in scheduling these services.  If you have already completed any of these items, please share that information with your care team to update in your medical record.  Health Maintenance   Topic Date Due   ? ZOSTER VACCINES (2 of 2) 05/07/2015   ? FALL RISK ASSESSMENT  04/29/2016   ? ADVANCE DIRECTIVES DISCUSSED WITH PATIENT  07/20/2017   ? COLONOSCOPY  11/22/2019   ? INFLUENZA VACCINE RULE BASED (Season Ended) 08/01/2019   ? TD 18+ HE  05/04/2021   ? PNEUMOCOCCAL POLYSACCHARIDE VACCINE AGE 65 AND OVER  Completed   ? PNEUMOCOCCAL CONJUGATE VACCINE FOR ADULTS (PCV13 OR PREVNAR)  Addressed       The new shingles shot (Shingrix) is 2 separate shots  by 2-6 months.    The cost out of pocket is around $390 for the 2 shots, so you might want to see if your insurance covers it or a portion of it prior to having it done.  Often by having it done at a pharmacy rather than a  clinic, it can be cheaper for you. I recommend that you check on the cost through your insurance or talk to your pharmacist about the cost of the vaccine.     It is estimated that any person's risk of shingles over their lifetime is around 10-20%.    The old shingles vaccine (Zostavax) is about 50% effective, reducing your risk of shingles to about 5-10% over your lifetime.    The new shot is 90% effective, reducing your risk of shingles to about 1-2% over your lifetime.    Because the shot is strong, it is very common to have flu like symptoms for 2-3 days after the shot.  25-50% of patients will have fever, muscle aches or headache.

## 2021-06-17 NOTE — PROGRESS NOTES
Internal Medicine Office Visit  [facility]  Patient Name: Colin Car  Patient Age: 83 y.o.  YOB: 1935  MRN: [id number]    Date of Visit: 2018  Reason for Office Visit:   Chief Complaint   Patient presents with     Paperwork           Assessment / Plan / Medical Decision Makin. Hyperlipemia  - atorvastatin (LIPITOR) 40 MG tablet; Take 1 tablet (40 mg total) by mouth at bedtime.  Dispense: 90 tablet; Refill: 3    2. Benign Essential Hypertension  - lisinopril-hydrochlorothiazide (PRINZIDE,ZESTORETIC) 20-25 mg per tablet; Take 1 tablet by mouth daily.  Dispense: 90 tablet; Refill: 3    3. Hard of hearing  - Ambulatory referral to Audiology    4. Constipation  - polyethylene glycol (GLYCOLAX) 17 gram/dose powder; Take 17 g by mouth daily as needed (constipation).  Dispense: 510 g; Refill: 5    5. Encounter for completion of form with patient  - Form completed and signed   Health Maintenance Review  Health Maintenance   Topic Date Due     DEPRESSION FOLLOW UP  1935     FALL RISK ASSESSMENT  2016     ADVANCE DIRECTIVES DISCUSSED WITH PATIENT  2017     COLONOSCOPY  2019     TD 18+ HE  2021     PNEUMOCOCCAL POLYSACCHARIDE VACCINE AGE 65 AND OVER  Completed     INFLUENZA VACCINE RULE BASED  Completed     PNEUMOCOCCAL CONJUGATE VACCINE FOR ADULTS (PCV13 OR PREVNAR)  Addressed     ZOSTER VACCINE  Addressed         I am having Mr. Car maintain his acetaminophen, atorvastatin, lisinopril-hydrochlorothiazide, and polyethylene glycol.      HPI:  Colin Car is a 83 y.o. year old who presents to the office today for form completion. He needs a physical form completed today for continued participation in the [facility] adult day care. He does not have any concerns today. He is accompanied by a professional .     HTN- blood pressure is well controlled with current medication. No lightheadedness or dizziness  associated with treatment.     HLD- He is taking a statin for this, denies myalgias or weakness associated with treatment.     Bowel movements are regular with miralax as needed.     He does have a new concern today regarding hearing. He has a hard time hearing conversations. He has to turn up the volume to hear things well. The right ear seems worse than the left.     Review of Systems- pertinent positive in bold:  A 10-point ROS is negative except as stated in HPI         Current Scheduled Meds:  Outpatient Encounter Prescriptions as of 4/11/2018   Medication Sig Dispense Refill     acetaminophen (TYLENOL EXTRA STRENGTH) 500 MG tablet Take 1 tablet (500 mg total) by mouth every 4 (four) hours as needed for pain. 30 tablet 0     atorvastatin (LIPITOR) 40 MG tablet Take 1 tablet (40 mg total) by mouth at bedtime. 90 tablet 3     lisinopril-hydrochlorothiazide (PRINZIDE,ZESTORETIC) 20-25 mg per tablet Take 1 tablet by mouth daily. 90 tablet 3     polyethylene glycol (GLYCOLAX) 17 gram/dose powder Take 17 g by mouth daily as needed (constipation). 510 g 5     [DISCONTINUED] acetaminophen (TYLENOL EXTRA STRENGTH) 500 MG tablet Take 1 tablet (500 mg total) by mouth every 4 (four) hours as needed for pain. 30 tablet 0     [DISCONTINUED] atorvastatin (LIPITOR) 40 MG tablet Take 1 tablet (40 mg total) by mouth at bedtime. 90 tablet 3     [DISCONTINUED] lisinopril-hydrochlorothiazide (PRINZIDE,ZESTORETIC) 20-25 mg per tablet Take 1 tablet by mouth daily. 90 tablet 3     [DISCONTINUED] polyethylene glycol (GLYCOLAX) 17 gram/dose powder Take 17 g by mouth daily as needed (constipation).  0     No facility-administered encounter medications on file as of 4/11/2018.      Past Medical History:   Diagnosis Date     Hyperlipidemia      Hypertension      Hypothyroidism      Mild Recurrent Major Depression     Created by Conversion      Past Surgical History:   Procedure Laterality Date     NO PAST SURGERIES       Social History    Substance Use Topics     Smoking status: Never Smoker     Smokeless tobacco: Never Used     Alcohol use No       Objective / Physical Examination:  Vitals:    04/11/18 1117   BP: 110/68   Pulse: 78   Weight: 159 lb (72.1 kg)     Wt Readings from Last 3 Encounters:   04/11/18 159 lb (72.1 kg)   01/04/18 160 lb (72.6 kg)   12/31/17 155 lb 3.2 oz (70.4 kg)     Body mass index is 30.04 kg/(m^2).     General Appearance: Alert and oriented, cooperative, affect appropriate, speech clear, in no apparent distress  Eyes: PERRL. Conjunctivae clear and sclerae non-icteric  Neck: Supple, trachea midline. No carotid bruits   Lungs: Clear to auscultation bilaterally. Normal inspiratory and expiratory effort  Cardiovascular: Regular rate, normal S1, S2. No murmurs, rubs, or gallops  Abdomen: Bowel sounds active all four quadrants. Soft, non-tender  Extremities: Pulses 2+ and equal throughout. No edema  Integumentary: Warm and dry    Orders Placed This Encounter   Procedures     Ambulatory referral to Audiology   Followup: Return in about 6 months (around 10/11/2018) for Next scheduled follow up. earlier if needed.        Allison Belle, CNP

## 2021-06-18 NOTE — ANESTHESIA CARE TRANSFER NOTE
Last vitals:   Vitals:    06/01/18 2030   BP: (P) 136/78   Pulse: (P) 74   Resp: (P) 28   Temp: (P) 36.7  C (98.1  F)   SpO2: (P) 94%     Patient's level of consciousness is drowsy  Spontaneous respirations: yes  Maintains airway independently: yes  Dentition unchanged: yes  Oropharynx: oropharynx clear of all foreign objects    QCDR Measures:  ASA# 20 - Surgical Safety Checklist: WHO surgical safety checklist completed prior to induction  PQRS# 430 - Adult PONV Prevention: 4558F - Pt received => 2 anti-emetic agents (different classes) preop & intraop  ASA# 8 - Peds PONV Prevention: NA - Not pediatric patient, not GA or 2 or more risk factors NOT present  PQRS# 424 - Jailyn-op Temp Management: 4559F - At least one body temp DOCUMENTED => 35.5C or 95.9F within required timeframe  PQRS# 426 - PACU Transfer Protocol: - Transfer of care checklist used  ASA# 14 - Acute Post-op Pain: ASA14B - Patient did NOT experience pain >= 7 out of 10

## 2021-06-18 NOTE — ANESTHESIA PREPROCEDURE EVALUATION
Anesthesia Evaluation      Patient summary reviewed   No history of anesthetic complications     Airway   Mallampati: II  Neck ROM: full   Pulmonary - negative ROS and normal exam                          Cardiovascular - normal exam  Exercise tolerance: > or = 4 METS  (+) hypertension, , hypercholesterolemia,      Neuro/Psych    (+) depression,     Endo/Other    (+) hypothyroidism, obesity,      GI/Hepatic/Renal - negative ROS      Other findings: Pt is non-English speaking, hx via . Lumbago, [place], visual impairment.  Was nauseated earlier, but has resolved.  Ate about 0740.  LUNA. Low grade fever.  Sats have been low, so on supplemental oxygen when seen.  EKG: inf. MI, present on or before EKG of 9/18/17.  Hg 18, K 4.2, GFR>60.      Dental - normal exam                        Anesthesia Plan  Planned anesthetic: general endotracheal  Have glidescope in room.  ASA 3   Induction: intravenous   Anesthetic plan and risks discussed with: patient    Post-op plan: routine recovery

## 2021-06-18 NOTE — ANESTHESIA PREPROCEDURE EVALUATION
Anesthesia Evaluation      Patient summary reviewed   No history of anesthetic complications     Airway   Mallampati: II  Neck ROM: full   Pulmonary - negative ROS and normal exam                          Cardiovascular - normal exam  Exercise tolerance: > or = 4 METS  (+) hypertension, , hypercholesterolemia,     (-) murmur  ECG reviewed  Rhythm: regular  Rate: normal,    no murmur      Neuro/Psych    (+) depression,     Endo/Other    (+) hypothyroidism, obesity (BMI 31),   Diabetes: Elevated BS, no dx of DM.     GI/Hepatic/Renal    (+)   chronic renal disease (Cr 1.52) ARF, impaired hepatic function (Elevated LFTs)    Comments: S/p cholecystectomy yesterday  Choledocholithiasis          Dental - normal exam                          Anesthesia Plan  Planned anesthetic: general endotracheal  Glidescope  Phenylephrine inline  Replace magnesium    Decadron 4 mg IV  Zofran    Sugammadex reversal  ASA 3   Induction: intravenous   Anesthetic plan and risks discussed with: patient, child/children and  services used (ong)    Post-op plan: routine recovery

## 2021-06-18 NOTE — ANESTHESIA POSTPROCEDURE EVALUATION
Patient: Colin Lou Josep  ENDOSCOPIC RETROGRADE CHOLANGIOPANCREATOGRAPHY; SPHINCTEROTOMY AND STONE EXTRACTION  Anesthesia type: general    Patient location: PACU  Last vitals:   Vitals:    06/02/18 1415   BP: 128/85   Pulse: 79   Resp: 18   Temp: 37  C (98.6  F)   SpO2: 97%     Post vital signs: stable  Level of consciousness: awake, alert and responds to simple questions  Post-anesthesia pain: pain controlled  Post-anesthesia nausea and vomiting: no  Pulmonary: unassisted, nasal cannula  Cardiovascular: stable and blood pressure at baseline  Hydration: adequate  Anesthetic events: no    QCDR Measures:  ASA# 11 - Jailyn-op Cardiac Arrest: ASA11B - Patient did NOT experience unanticipated cardiac arrest  ASA# 12 - Jailyn-op Mortality Rate: ASA12B - Patient did NOT die  ASA# 13 - PACU Re-Intubation Rate: ASA13B - Patient did NOT require a new airway mgmt  ASA# 10 - Composite Anes Safety: ASA10A - No serious adverse event    Additional Notes:  Some expiratory wheeze per RN report immediately after emergence.  Pt is not in distress, resting comfortably and oxygenating well.  Duoneb was administered with slight improvement in expiratory wheeze.

## 2021-06-18 NOTE — ANESTHESIA POSTPROCEDURE EVALUATION
Patient: Colin Car  CHOLECYSTECTOMY, LAPAROSCOPIC, COMMON DUCT EXPLORATION, CHOLANGIOGRAMS  Anesthesia type: general    Patient location: PACU  Last vitals:   Vitals:    06/01/18 2150   BP: 154/88   Pulse: (!) 104   Resp: 22   Temp: 36.7  C (98  F)   SpO2: 97%     Post vital signs: stable  Level of consciousness: opens eyes and responds readily with hand gestures; family brought in and he denies being in pain or having trouble breathing.  However, his initial breathing in PACU was shallow and rapid.  He also had presented pre-op with fever and audible wheezing.  Gave albuterol neb in PACU,   Then elected to try Bipap and his RR came down.  He appears to be exchanging easier on it than on his own.  ABGs had been done before bipap was started.  Post-anesthesia pain: pain controlled  Post-anesthesia nausea and vomiting: no  Pulmonary: bipap  Cardiovascular: stable and blood pressure at baseline  Hydration: adequate  Anesthetic events: no    QCDR Measures:  ASA# 11 - Jailyn-op Cardiac Arrest: ASA11B - Patient did NOT experience unanticipated cardiac arrest  ASA# 12 - Jailyn-op Mortality Rate: ASA12B - Patient did NOT die  ASA# 13 - PACU Re-Intubation Rate: ASA13B - Patient did NOT require a new airway mgmt  ASA# 10 - Composite Anes Safety: ASA10A - No serious adverse event    Additional Notes:

## 2021-06-18 NOTE — ANESTHESIA CARE TRANSFER NOTE
Last vitals:   Vitals:    06/02/18 1230   BP: 103/62   Pulse: 74   Resp: 20   Temp: 36.9  C (98.4  F)   SpO2: 98%     Patient's level of consciousness is drowsy  Spontaneous respirations: yes  Maintains airway independently: yes  Dentition unchanged: yes  Oropharynx: oropharynx clear of all foreign objects    QCDR Measures:  ASA# 20 - Surgical Safety Checklist: WHO surgical safety checklist completed prior to induction  PQRS# 430 - Adult PONV Prevention: 4558F - Pt received => 2 anti-emetic agents (different classes) preop & intraop  ASA# 8 - Peds PONV Prevention: NA - Not pediatric patient, not GA or 2 or more risk factors NOT present  PQRS# 424 - Jailyn-op Temp Management: 4559F - At least one body temp DOCUMENTED => 35.5C or 95.9F within required timeframe  PQRS# 426 - PACU Transfer Protocol: - Transfer of care checklist used  ASA# 14 - Acute Post-op Pain: ASA14B - Patient did NOT experience pain >= 7 out of 10

## 2021-06-18 NOTE — PROGRESS NOTES
Internal Medicine Office Visit  [facility]  Patient Name: Colin Car  Patient Age: 83 y.o.  YOB: 1935  MRN: [id number]    Date of Visit: 2018  Reason for Office Visit:   Chief Complaint   Patient presents with     Hospital Visit Follow Up           Assessment / Plan / Medical Decision Makin. Hospital discharge follow-up  2. S/P cholecystectomy  3. 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 JOSÉ MIGUEL (acute kidney injury)  5. Choledocholithiasis  6. HTN (hypertension)  - Reviewed inpatient records, imaging, and labs as part of today's visit  - Repeat labs to assure trend toward normal continues  - Will check TAA in 1 year with ultrasound. These results were discussed with patient today  - Magnesium  - Phosphorus  - HM1(CBC and Differential)  - Basic Metabolic Panel  - Hepatic Profile  - HM1 (CBC with Diff)      Health Maintenance Review  Health Maintenance   Topic Date Due     DEPRESSION FOLLOW UP  1935     FALL RISK ASSESSMENT  2016     ADVANCE DIRECTIVES DISCUSSED WITH PATIENT  2017     COLONOSCOPY  2019     TD 18+ HE  2021     PNEUMOCOCCAL POLYSACCHARIDE VACCINE AGE 65 AND OVER  Completed     INFLUENZA VACCINE RULE BASED  Completed     PNEUMOCOCCAL CONJUGATE VACCINE FOR ADULTS (PCV13 OR PREVNAR)  Addressed     ZOSTER VACCINE  Addressed         I am having Mr. Car maintain his acetaminophen, atorvastatin, lisinopril-hydrochlorothiazide, polyethylene glycol, aspirin, and calcium carbonate-vitamin D3.      HPI:  Colin Car is a 83 y.o. year old who presents to the office today for follow up of recent hospitalization. He is here today with a family member and professional .     He was seen in the ED with complaint of abdominal pain and found to have cholangitis and choledocholithiasis.  ERCP done and patient had cholecystectomy done this admission.  Liver enzymes  trending down.  Clear by GI and surgeon and discharged home today.  Due to elevated WBC, surgeon recommended to discharge with antibiotics and will give 5 days course of Augmentin.     Since his discharge from the hospital he states that he is feeling better. No concerns. Appetite and food/fluid intake is normal. Normal and regular bowel movements.     Chest x-ray showed atelectasis which was also seen on the CT of the chest. He has shortness of breath at baseline, does not feel that this has worsened and SOA has improved since the hospitalization.     He also had incidental findings of a mild aneurysmal dilatation of the ascending thoracic aorta measuring 4.5 cm in maximum diameter. This was reviewed with him today.     HTN- stable , no side effects associated with current treatment.     Review of Systems- pertinent positive in bold:  Constitutional: Fever, chills, night sweats, fainting, weight change, fatigue, seizures, dizziness, sleeping difficulties, loud snoring/pauses in breathing  Eyes: change in vision, blurred or double vision, redness/eye pain  Ears, nose, mouth, throat: change in hearing, ear pain, hoarseness, difficulty swallowing, sores in the mouth or throat  Respiratory: shortness of breath, cough, bloody sputum, wheezing  Cardiovascular: chest pain, palpitations   Gastrointestinal: abdominal pain mild and incisional, heartburn/indigestion, nausea/vomiting, change in appetite, change in bowel habits, constipation or diarrhea, rectal bleeding/dark stools, difficulty swallowing  Urinary: painful urination, frequent urination, urinary urgency/incontinence, blood in urine/dark urine, nocturia  Skin: change in moles/freckles, rash, nodules  Hematologic/lymphatic: swollen lymph glands, abnormal bruising/bleeding  Endocrine: excessive thirst/urination, cold or heat intolerance  Neurologic/emotional: worrisome memory change, numbness/tingling, anxiety, mood swings      Current Scheduled Meds:  Outpatient  Encounter Prescriptions as of 2018   Medication Sig Dispense Refill     acetaminophen (TYLENOL EXTRA STRENGTH) 500 MG tablet Take 1 tablet (500 mg total) by mouth every 4 (four) hours as needed for pain. 30 tablet 0     [] amoxicillin-clavulanate (AUGMENTIN) 875-125 mg per tablet Take 1 tablet by mouth 2 (two) times a day for 5 days. 9 tablet 0     aspirin 81 MG EC tablet Take 81 mg by mouth daily.       atorvastatin (LIPITOR) 40 MG tablet Take 1 tablet (40 mg total) by mouth at bedtime. 90 tablet 3     calcium carbonate-vitamin D3 600 mg(1,500mg) -200 unit per tablet Take 1 tablet by mouth daily.       lisinopril-hydrochlorothiazide (PRINZIDE,ZESTORETIC) 20-25 mg per tablet Take 1 tablet by mouth daily. 90 tablet 3     polyethylene glycol (GLYCOLAX) 17 gram/dose powder Take 17 g by mouth daily as needed (constipation). 510 g 5     No facility-administered encounter medications on file as of 2018.      Past Medical History:   Diagnosis Date     Hyperlipidemia      Hypertension      Hypothyroidism      Mild Recurrent Major Depression     Created by Conversion      Past Surgical History:   Procedure Laterality Date     ERCP N/A 2018    Procedure: ENDOSCOPIC RETROGRADE CHOLANGIOPANCREATOGRAPHY; SPHINCTEROTOMY AND STONE EXTRACTION;  Surgeon: Juve Shannon MD;  [place] [facility] [place]:      LAPAROSCOPIC CHOLECYSTECTOMY N/A 2018    Procedure: CHOLECYSTECTOMY, LAPAROSCOPIC, COMMON DUCT EXPLORATION;  Surgeon: Meir Beck MD;  [place] [facility] [place]  Service:      NO PAST SURGERIES       Social History   Substance Use Topics     Smoking status: Never Smoker     Smokeless tobacco: Never Used     Alcohol use No       Objective / Physical Examination:  Vitals:    18 1142   BP: 120/70   Pulse: 60   Weight: 151 lb (68.5 kg)     Wt Readings from Last 3 Encounters:   18 151 lb (68.5 kg)   18 164 lb 14.4 oz (74.8 kg)   18 159 lb (72.1 kg)     Body mass  index is 29.49 kg/(m^2).   Study Result   CTA CHEST ABDOMEN PELVIS  6/1/2018 10:44 AM     INDICATION: Dissection, aorta, abdominal dissection. Right-sided abdominal/chest pain, differential BP. Evaluate for aortic dissection.  TECHNIQUE: Multiphase helical acquisition through the chest, abdomen, and pelvis was performed including noncontrast, arterial phase, and delayed images before and after the administration of IV contrast. 2D and 3D reconstructions were performed by the   CT technologist. Dose reduction techniques were used.   IV CONTRAST: Iohexol (Omni) 75 mL.  COMPARISON: None.     FINDINGS:  CT ANGIOGRAM CHEST, ABDOMEN, AND PELVIS: Mild aneurysmal dilatation of the ascending thoracic aorta measuring 4.5 cm in maximum diameter. At the brachiocephalic artery the aorta measures 3.7 cm, at the sinotubular ridge 3.9 cm. Normal descending thoracic   aorta.     No dissections of the thoracic or abdominal aorta.     Great vessels off the aortic arch and abdominal aorta are widely patent. Iliac arteries are widely patent but very tortuous.     Pulmonary arteries are only moderately well-seen with no significant central pulmonary emboli.     CHEST:  LUNGS AND PLEURA: Shallow inspiration. Minimal atelectasis right mid lung. Lungs otherwise clear.     MEDIASTINUM: Negative. No adenopathy.     ABDOMEN: Several benign cysts in the kidneys, largest on the left side measures 8 cm. Kidneys otherwise negative.     No significant findings in the liver, spleen, pancreas, and adrenal glands.     Extrahepatic bile ducts are mildly dilated measuring 10 mm. No intrahepatic bile duct dilatation. This may be normal in a patient of this age. Recommend correlating with alkaline phosphatase.     PELVIS: Negative. No masses.     MUSCULOSKELETAL: No concerning bone lesions.     IMPRESSION:   CONCLUSION:  1.  No significant findings to explain the patient's pain.     2.  No dissections. No large central pulmonary emboli.     3.  Mild  aneurysmal dilatation of the ascending thoracic aorta measures 4.5 cm in maximum diameter.     4.  Simple cysts in the kidneys.     5.  Mildly prominent extrahepatic bile ducts most likely normal variation but recommend correlating with alkaline phosphatase.       Study Result   XR CHEST 2 VIEWS  6/1/2018 10:47 AM     INDICATION: Ruq abd pain vs chest pain  COMPARISON: None.     FINDINGS: Heart size at upper limits of normal. Tortuous thoracic aorta. Slight diffuse interstitial prominence with linear density at right base most suggestive of discoid atelectasis or scar. Fullness in the right paratracheal region likely relates to   vascular tortuosity.       Study Result   XR CHOLANGIOGRAM INTRAOPERATIVE  6/1/2018 8:01 PM     INDICATION: Surgery  TECHNIQUE: Exam performed by gastroenterologist.  COMPARISON: None.     FLUOROSCOPIC TIME: 2.1 minutes minutes  NUMBER OF IMAGES: 6     FINDINGS: Cholecystectomy. Contrast material injected into the cystic duct. Opacification of the common duct which is dilated and contains several filling defects distally compatible with stones. These were subsequently extracted.            Procedure Abnormality Status   XR ERCP W Fluoro     Study Result   XR ERCP BILIARY ONLY  06/02/2018, 12:19 PM     INDICATION: Choledocholithiasis.  TECHNIQUE: Exam performed by gastroenterologist.  COMPARISON: None.     FLUOROSCOPIC TIME: 2.6 minutes.  NUMBER OF IMAGES: 6.     FINDINGS:     BILE DUCTS: The common bile duct is dilated. There are filling defects visible within the duct. The operative note indicated that a sphincterotomy was performed with removal of stones and sludge  PANCREATIC DUCT: Not imaged.            General Appearance: Alert and oriented, cooperative, affect appropriate, speech clear, in no apparent distress  Lungs: Clear to auscultation bilaterally. Normal inspiratory and expiratory effort  Cardiovascular: Regular rate, normal S1, S2. No murmurs, rubs, or gallops  Abdomen: There is  mild bruising over abdomen particularly by abdominal incisions. bowel sounds active all four quadrants. Soft, mild generalized tenderness particularly over abdominal incisions. No rebound tenderness or guarding   Extremities: Pulses 2+ and equal throughout. No edema.     Orders Placed This Encounter   Procedures     Magnesium     Phosphorus     Basic Metabolic Panel     Hepatic Profile     HM1 (CBC with Diff)   Followup: Return in about 6 months (around 12/7/2018) for Next scheduled follow up. earlier if needed. Will see patient sooner depending on lab results       Allison Belle, CNP

## 2021-06-18 NOTE — PATIENT INSTRUCTIONS - HE
"Patient Instructions by Ivonne Jordan PA-C at 5/17/2021 10:00 AM     Author: Ivonne Jordan PA-C Service: -- Author Type: Physician Assistant    Filed: 5/17/2021 10:34 AM Encounter Date: 5/17/2021 Status: Addendum    : Ivonne Jordan PA-C (Physician Assistant)    Related Notes: Original Note by Ivonne Jordan PA-C (Physician Assistant) filed at 5/17/2021 10:25 AM       1. Take Valcyclovir 1000 mg three times daily for a total of 7 days.  2. For pain I recommend over the counter pain medications such as Tylenol. Take Tylenol up to 1000 mg every  6 hours. Do not exceed 4000mg of acetaminophen from any source in a 24 hour period.   3. Follow up at [facility] tomorrow at 1:30PM. Please bring your insurance card and photo ID.       What is shingles? -- Shingles is a painful rash that is shaped like a band or a belt. Shingles can affect people of all ages, but it is most common in those older than 50. Another name for shingles is \"herpes zoster.\"    What causes shingles? -- Shingles is caused by the same virus that causes chickenpox. After someone has chickenpox, the virus sometimes hides out, \"asleep\" in the body. Years later, it can \"wake up\" and cause shingles. The first time a person is infected with that virus, he or she gets chickenpox, not shingles.    Is shingles contagious? -- Yes and no. It is NOT possible to \"catch\" shingles from someone who has the rash. But it is possible to \"catch\" the virus and then get sick with chickenpox. Shingles and chickenpox are caused by the same virus.  You probably will NOT catch the virus (or get chickenpox) if you:  ?Had chickenpox or shingles in the past  ?Had the chickenpox vaccine  ?Were born in the [place] before 1980 (most people born before 1980 have had chickenpox even if they don't remember it)  If you have never had chickenpox or the chickenpox vaccine, be careful around anyone with shingles. Do not touch their rash. If you do, you " "could get sick with chickenpox. In rare cases, people can even get chickenpox from just being near someone with shingles. This is most likely in people who cannot fight infections well (immunocompromised individuals)    What are the symptoms of shingles? -- At first, shingles causes weird sensations on your skin. You might feel itching, burning, pain, or tingling. Some people get a fever, feel sick, or get a headache. Within 1 to 2 days, a rash with blisters appears. Blisters most often appear in a band across the chest and back. They can show up on other parts of the body, too. The blisters cause pain that can be mild or severe.  Within 3 to 4 days, shingles blisters can become open sores or \"ulcers\". These ulcers can get infected. Within 7 to 10 days, the rash should scab over. By then, most people are no longer contagious.    Can shingles be serious? -- Yes. Shingles can be serious, but that is rare. About 1 out of 10 people with shingles will get something called \"postherpetic neuralgia,\" or \"PHN.\" People with PHN keep feeling pain or discomfort even after their rash goes away. This pain can last for months or even years. It can be so bad that it makes it hard to sleep, causes weight loss, and leads to depression.  Shingles can also cause:  ?Skin infections  ?Eye problems (if the rash is near the eye)  ?Ear problems (if the rash is near the ear)  ?Dangerous infections in people who have other health problems    Should I be treated? -- If you see your doctor or nurse within 3 days of when your symptoms start, yes. He or she should give you medicines to help you get rid of the virus. These medicines are called anti-virals. They can speed your recovery and reduce the chances that you will have shingles-related problems such as PHN.    Can the pain be treated? -- Some people can deal with their pain with non-prescription pain medicines, but most people need prescription medicines.  How should I take care of the " rash? -- Keep the parts of your skin that have a rash clean and dry. Do not use creams or gels unless your doctor or nurse says you should.    Can shingles be prevented? -- People can reduce their chances of getting shingles by getting the shingles vaccine. The vaccine can also make the symptoms of shingles milder if they do occur. Experts recommend that most people age 60 and older should get the shingles vaccine. But, some people who have problems with their immune system should not get the vaccine.

## 2021-06-20 NOTE — LETTER
Letter by Allison Belle FNP at      Author: Allison Belle FNP Service: -- Author Type: --    Filed:  Encounter Date: 6/11/2020 Status: (Other)         Colin Car  [address] [facility] [address]      06/15/20      Dear Colin,      In reviewing your records, we have determined a medication check is needed before your next refill, please call the [facility] to schedule an appointment.      Medication check/review      We have made attempts to call you for an appointment, please verify your contact information is correct when calling back for an appointment, or if you have transferred your care to another clinic, please contact us so we can update our records.     Please call [phone] to schedule an appointment.    We believe that a strong preventative care program, including regular physicals and follow-up care is an important part of a healthy lifestyle and we are committed to helping you maintain your health.    Thank you for choosing us as your health care provider.    Sincerely,    Colleen Hollins  ZHEN  [facility]  [address] [address]  [phone]

## 2021-06-20 NOTE — LETTER
Letter by Allison Belle FNP at      Author: Allison Belle FNP Service: -- Author Type: --    Filed:  Encounter Date: 9/29/2020 Status: (Other)       Colin Car  [address] [facility] [address]      09/30/20      Dear Colin,      In reviewing your records, we have determined a medication check is needed before your next refill, please call the [facility] to schedule an appointment.      Medication check/review      We have made attempts to call you for an appointment, please verify your contact information is correct when calling back for an appointment, or if you have transferred your care to another clinic, please contact us so we can update our records.     Please call [phone] to schedule an appointment.    We believe that a strong preventative care program, including regular physicals and follow-up care is an important part of a healthy lifestyle and we are committed to helping you maintain your health.    Thank you for choosing us as your health care provider.    Sincerely,    Juhi Chavira   CMA - CMT/CA  [facility]  [address] [address]  [phone]

## 2021-06-21 NOTE — LETTER
Letter by Joceline Beck MD at      Author: Joceline Beck MD Service: -- Author Type: --    Filed:  Encounter Date: 1/22/2021 Status: (Other)         Colin Car  [address] [facility] [address]             January 22, 2021         Dear Mr. Car,    Below are the results from your recent visit:    Resulted Orders   CTA Chest    Narrative    EXAM: CTA CHEST  LOCATION: [facility]  DATE/TIME: 1/19/2021 8:21 PM    INDICATION: Thoracic aortic aneurysm (TAA), known, follow up f/u thoracic aortic aneurysm and mediastinal/hilar adenopathy  COMPARISON: None.  TECHNIQUE: CT chest pulmonary angiogram during arterial phase injection of IV contrast. Multiplanar reformats and MIP reconstructions were performed. Dose reduction techniques were used.   CONTRAST: Iohexol (Omni) 75mL    FINDINGS:  ANGIOGRAM CHEST: Mildly dilated thoracic aorta, measurements as follows, obtained on multiple double oblique images oriented to the long axis of the aorta:  Aortic root at the sinuses of Valsalva: 35 x 32 x 36 mm  Ascending aorta at the right pulmonary artery: 43 mm  Transverse arch: 32 mm  Mid descending thoracic aorta: 31 mm  Distal descending thoracic aorta: 20 mm    no significant aortic valve leaflet calcification. Coronary origins in normal position. Normal three-vessel aortic arch with minimal arch angulation. Mild innominate artery dilation measuring 17 mm. No significant atheromatous plaque or atherosclerotic   calcification the ascending aorta. Moderate slightly ulcerated atheromatous plaque along the aortic arch and proximal descending thoracic aorta but no evidence for penetrating ulcer. No aortic dissection.    Pulmonary arteries normal in caliber.      HEART: Cardiac chambers within normal limits. No pericardial effusion. Mild coronary artery calcification.    LUNGS AND PLEURA: No pulmonary mass, consolidation, or suspicious pulmonary nodule. Mild smooth  bronchiectasis. No evidence for bronchitis. Mild subsegmental atelectasis or scarring within the left upper lobe and lateral right middle lobe. No pleural   effusion or pneumothorax.    MEDIASTINUM: Stable borderline enlarged AP window lymph node, 10 mm. Stable mildly prominent bilateral hilar lymph nodes.    LIMITED UPPER ABDOMEN: Pneumobilia. Prior cholecystectomy. Large renal cysts unchanged.    MUSCULOSKELETAL: Negative.      Impression    1.  Mildly dilated ascending aorta measuring 43 mm at the level the right pulmonary artery.         Your CT chest shows that your ascending aorta is mildly dilated at 4.3cm. We should repeat in 1 year to  follow it.      Please let me know if you any questions or concerns,    Dr. Brown       Sent by Isma TAI CMA          Electronically signed by Joceline Beck MD

## 2021-06-21 NOTE — LETTER
Letter by Joceline Beck MD at      Author: Joceline Beck MD Service: -- Author Type: --    Filed:  Encounter Date: 1/12/2021 Status: (Other)         Colin Car  [address] [facility] [address]             January 12, 2021         Dear Mr. Car,    Below are the results from your recent visit:    Resulted Orders   Comprehensive Metabolic Panel   Result Value Ref Range    Sodium 140 136 - 145 mmol/L    Potassium 3.9 3.5 - 5.0 mmol/L    Chloride 102 98 - 107 mmol/L    CO2 27 22 - 31 mmol/L    Anion Gap, Calculation 11 5 - 18 mmol/L    Glucose 93 70 - 125 mg/dL    BUN 22 8 - 28 mg/dL    Creatinine 1.19 0.70 - 1.30 mg/dL    GFR MDRD Af Amer >60 >60 mL/min/1.73m2    GFR MDRD Non Af Amer 58 (L) >60 mL/min/1.73m2    Bilirubin, Total 1.1 (H) 0.0 - 1.0 mg/dL    Calcium 9.2 8.5 - 10.5 mg/dL    Protein, Total 7.7 6.0 - 8.0 g/dL    Albumin 4.1 3.5 - 5.0 g/dL    Alkaline Phosphatase 70 45 - 120 U/L    AST 23 0 - 40 U/L    ALT 17 0 - 45 U/L    Narrative    Fasting Glucose reference range is 70-99 mg/dL per  American Diabetes Association (ADA) guidelines.   Thyroid Cascade   Result Value Ref Range    TSH 6.70 (H) 0.30 - 5.00 uIU/mL   HM1 (CBC with Diff)   Result Value Ref Range    WBC 6.5 4.0 - 11.0 thou/uL    RBC 5.96 4.40 - 6.20 mill/uL    Hemoglobin 18.8 (H) 14.0 - 18.0 g/dL    Hematocrit 57.8 (H) 40.0 - 54.0 %    MCV 97 80 - 100 fL    MCH 31.5 27.0 - 34.0 pg    MCHC 32.5 32.0 - 36.0 g/dL    RDW 12.8 11.0 - 14.5 %    Platelets 155 140 - 440 thou/uL    MPV 10.8 8.5 - 12.5 fL    Neutrophils % 51 50 - 70 %    Lymphocytes % 39 20 - 40 %    Monocytes % 8 2 - 10 %    Eosinophils % 1 0 - 6 %    Basophils % 1 0 - 2 %    Immature Granulocyte % 0 <=0 %    Neutrophils Absolute 3.3 2.0 - 7.7 thou/uL    Lymphocytes Absolute 2.5 0.8 - 4.4 thou/uL    Monocytes Absolute 0.5 0.0 - 0.9 thou/uL    Eosinophils Absolute 0.1 0.0 - 0.4 thou/uL    Basophils Absolute 0.1 0.0 - 0.2 thou/uL    Immature  Granulocyte Absolute 0.0 <=0.0 thou/uL   T4, Free   Result Value Ref Range    Free T4 0.7 0.7 - 1.8 ng/dL        It was nice to meet you the other day. Your labs are back. Your electrolytes, kidney function, and liver  tests are stable. Your thyroid test shows that you have what we call subclinical hypothyroidism, meaning  that while your thyroid levels are slightly abnormal, you do not require treatment with medication. We  can just monitor this at least yearly moving forward.      Your blood counts show that your hemoglobin which are the blood cells that carry oxygen around the  body is high. It has been high in the past. I would like you to come in for additional blood testing to figure  out what is going on. Sometimes this can be a sign of an underlying medical problem. Please let me know  if you would be agreeable to additional testing and we can set you up for a lab only visit.      Please let me know if you any questions or concerns,     Please call with questions or contact us using [email or web address].    Sincerely,        Electronically signed by Joceline Beck MD

## 2021-06-30 NOTE — PROGRESS NOTES
"Progress Notes by Ivonne Jordan PA-C at 5/17/2021 10:00 AM     Author: Ivonne Jordan PA-C Service: -- Author Type: Physician Assistant    Filed: 5/17/2021 11:04 AM Encounter Date: 5/17/2021 Status: Signed    : Ivonne Jordan PA-C (Physician Assistant)       Chief Complaint   Patient presents with   ? Facial Swelling     Swelling on R side of face & rash x3 days          Clinical Decision Making: shingles consistent rash present on face. Patient started on Valtrex. Pain is manageable, recommend Tylenol for continuing control. Given the proximity to the eye I recommend close follow up with Ophthalmology; appointment was made for tomorrow with [facility]. No current reports of eye pain or vision changes.     1. Herpes zoster with complication  valACYclovir (VALTREX) 1000 MG tablet    Ambulatory referral to Ophthalmology         Patient Instructions   1. Take Valcyclovir 1000 mg three times daily for a total of 7 days.  2. For pain I recommend over the counter pain medications such as Tylenol. Take Tylenol up to 1000 mg every  6 hours. Do not exceed 4000mg of acetaminophen from any source in a 24 hour period.   3. Follow up at [facility] tomorrow at 1:30PM. Please bring your insurance card and photo ID.       What is shingles? -- Shingles is a painful rash that is shaped like a band or a belt. Shingles can affect people of all ages, but it is most common in those older than 50. Another name for shingles is \"herpes zoster.\"    What causes shingles? -- Shingles is caused by the same virus that causes chickenpox. After someone has chickenpox, the virus sometimes hides out, \"asleep\" in the body. Years later, it can \"wake up\" and cause shingles. The first time a person is infected with that virus, he or she gets chickenpox, not shingles.    Is shingles contagious? -- Yes and no. It is NOT possible to \"catch\" shingles from someone who has the rash. But it is possible to \"catch\" the " "virus and then get sick with chickenpox. Shingles and chickenpox are caused by the same virus.  You probably will NOT catch the virus (or get chickenpox) if you:  ?Had chickenpox or shingles in the past  ?Had the chickenpox vaccine  ?Were born in the [place] before 1980 (most people born before 1980 have had chickenpox even if they don't remember it)  If you have never had chickenpox or the chickenpox vaccine, be careful around anyone with shingles. Do not touch their rash. If you do, you could get sick with chickenpox. In rare cases, people can even get chickenpox from just being near someone with shingles. This is most likely in people who cannot fight infections well (immunocompromised individuals)    What are the symptoms of shingles? -- At first, shingles causes weird sensations on your skin. You might feel itching, burning, pain, or tingling. Some people get a fever, feel sick, or get a headache. Within 1 to 2 days, a rash with blisters appears. Blisters most often appear in a band across the chest and back. They can show up on other parts of the body, too. The blisters cause pain that can be mild or severe.  Within 3 to 4 days, shingles blisters can become open sores or \"ulcers\". These ulcers can get infected. Within 7 to 10 days, the rash should scab over. By then, most people are no longer contagious.    Can shingles be serious? -- Yes. Shingles can be serious, but that is rare. About 1 out of 10 people with shingles will get something called \"postherpetic neuralgia,\" or \"PHN.\" People with PHN keep feeling pain or discomfort even after their rash goes away. This pain can last for months or even years. It can be so bad that it makes it hard to sleep, causes weight loss, and leads to depression.  Shingles can also cause:  ?Skin infections  ?Eye problems (if the rash is near the eye)  ?Ear problems (if the rash is near the ear)  ?Dangerous infections in people who have other health problems    Should I " be treated? -- If you see your doctor or nurse within 3 days of when your symptoms start, yes. He or she should give you medicines to help you get rid of the virus. These medicines are called anti-virals. They can speed your recovery and reduce the chances that you will have shingles-related problems such as PHN.    Can the pain be treated? -- Some people can deal with their pain with non-prescription pain medicines, but most people need prescription medicines.  How should I take care of the rash? -- Keep the parts of your skin that have a rash clean and dry. Do not use creams or gels unless your doctor or nurse says you should.    Can shingles be prevented? -- People can reduce their chances of getting shingles by getting the shingles vaccine. The vaccine can also make the symptoms of shingles milder if they do occur. Experts recommend that most people age 60 and older should get the shingles vaccine. But, some people who have problems with their immune system should not get the vaccine.             HPI:  Colin Car is a 86 y.o. male with PMHx of HTN, visual impairement who presents today complaining of rash on the right side of the face x 4 days. Patient reports mild, bearable pain. He denies any vision changes or eyeball pain. He states that the only thing affecting his vision is the swelling and droop of the upper lid.     History obtained from the patient.    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   cm CTA chest 2021, repeat in 1 year  2018-06: Chest pain  2018-06: Total bilirubin, elevated  2018-06: Right upper quadrant abdominal pain  2018-06: Leukocytosis, unspecified type  2018-06: Choledocholithiasis  2018-06: JOSÉ MIGUEL (acute kidney injury) (H)  2017-12: GI bleed  2017-12: Rectal bleeding  2017-07: Low, vision, both eyes  2017-07: Bilateral hearing loss  2015-03: Overweight   Chest  Pain  Fever (Symptom)  Subclinical hypothyroidism  HLD (hyperlipidemia)  Mild Recurrent Major Depression  HTN (hypertension)  Lumbago  Visual Impairment      Past Medical History:   Diagnosis Date   ? Hyperlipidemia    ? Hypertension    ? Hypothyroidism    ? Mild Recurrent Major Depression     Created by Conversion        Social History     Tobacco Use   ? Smoking status: Never Smoker   ? Smokeless tobacco: Never Used   Substance Use Topics   ? Alcohol use: No       Review of Systems   Constitutional: Negative for chills and fever.   Eyes: Negative for photophobia, pain and visual disturbance.   Skin: Positive for rash.       Vitals:    05/17/21 1016   BP: (!) 143/92   Patient Site: Left Arm   Patient Position: Sitting   Cuff Size: Adult Regular   Pulse: 74   Resp: 10   Temp: 98.7  F (37.1  C)   TempSrc: Oral   SpO2: 97%   Weight: 153 lb (69.4 kg)       Physical Exam  Vitals signs and nursing note reviewed. Exam conducted with a chaperone present.   Constitutional:       General: He is not in acute distress.     Appearance: He is well-developed. He is not diaphoretic.   HENT:      Head: Normocephalic and atraumatic.      Right Ear: External ear normal.      Left Ear: External ear normal.   Eyes:      General:         Right eye: No discharge.         Left eye: No discharge.      Extraocular Movements: Extraocular movements intact.      Conjunctiva/sclera: Conjunctivae normal.      Right eye: Right conjunctiva is not injected. No exudate or hemorrhage.     Pupils: Pupils are equal, round, and reactive to light.      Comments: Upper lid had scabbed rash and mild swelling. No significant erythema.    Cardiovascular:      Rate and Rhythm: Normal rate and regular rhythm.      Heart sounds: Normal heart sounds.   Pulmonary:      Effort: Pulmonary effort is normal. No respiratory distress.      Breath sounds: Normal breath sounds.   Skin:     Comments: Shingles like rash that is scabbed on the right side of the face  including right eyelid, right forehead, and right anterior scalp. No current signs of secondary infection.   Neurological:      Mental Status: He is alert.   Psychiatric:         Behavior: Behavior normal.         Thought Content: Thought content normal.         Judgment: Judgment normal.       at the end of the encounter, I discussed results, diagnosis, medications. Discussed red flags for immediate return to clinic/ER, as well as indications for follow up if no improvement. Patient understood and agreed to plan. Patient was stable for discharge.

## 2021-06-30 NOTE — PROGRESS NOTES
Progress Notes by Jessy Johnson AuD at 3/11/2021  8:00 AM     Author: Jessy Johnson AuD Service: -- Author Type: Audiologist    Filed: 3/12/2021  8:26 AM Encounter Date: 3/11/2021 Status: Signed    : Jessy Johnson AuD (Audiologist)       Audiology Report:    Referring Provider: Joceline Beck M.D.    SUBJECTIVE: Colin Car, 86 y.o. male, was seen 03/11/21 for a comprehensive hearing evaluation. He was accompanied by his daughter-in-law and an  was present via phone.     Colin reports difficulty hearing bilaterally for many years. He reports that the left ear is worse than the right. He is interested in hearing aids if he is a candidate.  He denies any ear pain or drainage, aural fullness, tinnitus, dizziness, history of ear surgeries, history of noise exposure, or family history of hearing loss.      OBJECTIVE:    Left Ear Right Ear   Otoscopy No cerumen No cerumen   Pure Tone Audiometry Profound rising to moderately severe presumably sensorineural hearing loss  *See note below Moderately severe to severe presumably sensorineural hearing loss    *See note below   Word Recognition Did not test due to no in person  Did not test due to no in-person    Tympanometry Type A (Normal) Type Ad (Hypermobile)   Acoustic Reflexe Screen (1kHz) Present WNL Absent      Transducer: Insert earphones and Circumaural headphones. Reliability was poor and there was poor SDT to PTA agreement. SDT thresholds were at levels lower than all thresholds. It seemed that Colin had difficulty understanding the task and was somewhat confused throughout today's appointment. He had difficulty with masking especially. Thresholds did improve slight in the high frequencies after repeated instruction, but should still be interpreted with caution. We discussed the need for an accurate hearing test and medical clearance prior to fitting hearing aids. Colin initially got worried that he would  not be able to get hearing aids. I assured him we can still proceed with discussing various amplification options following his next evaluation.     ASSESSMENT: Today's evaluation indicates presumably sensorineural hearing loss bilaterally with the left ear being worse.     PLAN:  Colin should return return for another hearing test with an in-person  in hopes of obtaining a more reliable test and word recognition. Colin should also follow up with ENT for the asymmetry noted and medical clearance for hearing aids following the hearing test.     Please see audiogram under media and audiogram in the patients chart.     Vira Rivera, [facility]  Clinical Audiologist  [place] #[id number]

## 2021-07-14 PROBLEM — K62.5 RECTAL BLEEDING: Status: RESOLVED | Noted: 2017-12-31 | Resolved: 2018-04-11

## 2021-07-14 PROBLEM — K92.2 GI BLEED: Status: RESOLVED | Noted: 2017-12-31 | Resolved: 2018-04-11

## 2021-09-16 ENCOUNTER — TELEPHONE (OUTPATIENT)
Dept: AUDIOLOGY | Facility: CLINIC | Age: 86
End: 2021-09-16

## 2021-09-16 NOTE — TELEPHONE ENCOUNTER
"E-mail received from patient's care coordinator from insurance. She inquired about hearing aid eligibility. Patient reported he recently had a hearing test (claims indicted provider), but it was unclear if he was eligible for hearing aids or not or if they were just not covered.       Return email letting her know that: \"patient was seen for hearing exam, however results were unreliable (likely due to a phone ). It looks like he would  be a candidate for hearing aids, but we recommended he return to try testing again with an in-person  to help obtain reliable results prior to selecting a hearing aid, as well follow up with the ENT physician due to the asymmetry in hearing frost the results that were obtained. Prior to being fit with hearing aids the ENT physician needs to complete an examination and provide medical clearance.  It seems that an appointment was scheduled for the hearing test and ENT visit with in person  on 3/31, but from my records it does not appear that this appointment was attended by the patient. If patient is given medical clearance by the physician and additional testing can be obtained, he meets the criteria for hearing aids and they should be covered if he has not been fit with devices in the last 5 years.\"    Instructed her to call or email with additional questions or concerns.     Rolando Rivera. CCC-A  Licensed Audiologist   [place] #[id number]      "

## 2021-09-21 ENCOUNTER — OFFICE VISIT (OUTPATIENT)
Dept: INTERNAL MEDICINE | Facility: CLINIC | Age: 86
End: 2021-09-21
Payer: COMMERCIAL

## 2021-09-21 VITALS
BODY MASS INDEX: 30.43 KG/M2 | HEART RATE: 69 BPM | OXYGEN SATURATION: 96 % | SYSTOLIC BLOOD PRESSURE: 130 MMHG | WEIGHT: 155 LBS | HEIGHT: 60 IN | DIASTOLIC BLOOD PRESSURE: 88 MMHG

## 2021-09-21 DIAGNOSIS — Z00.00 ENCOUNTER FOR MEDICARE ANNUAL WELLNESS EXAM: Primary | ICD-10-CM

## 2021-09-21 DIAGNOSIS — E78.2 MIXED HYPERLIPIDEMIA: ICD-10-CM

## 2021-09-21 DIAGNOSIS — N18.31 STAGE 3A CHRONIC KIDNEY DISEASE (H): ICD-10-CM

## 2021-09-21 DIAGNOSIS — I10 ESSENTIAL HYPERTENSION: ICD-10-CM

## 2021-09-21 DIAGNOSIS — D58.2 ELEVATED HEMOGLOBIN (H): ICD-10-CM

## 2021-09-21 PROBLEM — N18.30 CHRONIC KIDNEY DISEASE, STAGE 3 (H): Status: ACTIVE | Noted: 2021-09-21

## 2021-09-21 PROCEDURE — 90662 IIV NO PRSV INCREASED AG IM: CPT | Performed by: NURSE PRACTITIONER

## 2021-09-21 PROCEDURE — 99214 OFFICE O/P EST MOD 30 MIN: CPT | Mod: 25 | Performed by: NURSE PRACTITIONER

## 2021-09-21 PROCEDURE — G0008 ADMIN INFLUENZA VIRUS VAC: HCPCS | Performed by: NURSE PRACTITIONER

## 2021-09-21 PROCEDURE — 99397 PER PM REEVAL EST PAT 65+ YR: CPT | Mod: 25 | Performed by: NURSE PRACTITIONER

## 2021-09-21 ASSESSMENT — MIFFLIN-ST. JEOR: SCORE: 1240.58

## 2021-09-21 ASSESSMENT — ACTIVITIES OF DAILY LIVING (ADL): CURRENT_FUNCTION: NO ASSISTANCE NEEDED

## 2021-09-21 NOTE — PROGRESS NOTES
"SUBJECTIVE:   Colin Car is a 86 year old male who presents for Preventive Visit. A professional telephone Alexandr  is used for today's visit.     He is here today with his daughter-in-law, Scarlett.     We reviewed the lab testing that is typically done annually for the HTN and HLD medication. He feels that he does not need blood work at his age and doesn't feel any differently so he doesn't want to do the blood tests.     Patient has been advised of split billing requirements and indicates understanding: Yes   Are you in the first 12 months of your Medicare coverage?  No    Healthy Habits:     In general, how would you rate your overall health?  Good    Frequency of exercise:  2-3 days/week    Duration of exercise:  15-30 minutes    Do you usually eat at least 4 servings of fruit and vegetables a day, include whole grains    & fiber and avoid regularly eating high fat or \"junk\" foods?  Yes    Taking medications regularly:  No    Medication side effects:  None    Ability to successfully perform activities of daily living:  No assistance needed    Home Safety:  No safety concerns identified    Hearing Impairment:  Need to ask people to speak up or repeat themselves    In the past 6 months, have you been bothered by leaking of urine?  No    In general, how would you rate your overall mental or emotional health?  Good      PHQ-2 Total Score: 0    Additional concerns today:  No    Do you feel safe in your environment? Yes    Have you ever done Advance Care Planning? (For example, a Health Directive, POLST, or a discussion with a medical provider or your loved ones about your wishes): No, advance care planning information given to patient to review.  Patient plans to discuss their wishes with loved ones or provider.         Fall risk  Fallen 2 or more times in the past year?: No  Any fall with injury in the past year?: No    Cognitive Screening   1) Repeat 3 items (Leader, Season, Table)    2) Clock draw: " unable to draw clock per patient  3) 3 item recall: Recalls 1 object   Results: abnormal but pt does not use a clock so unable to assess cognition from this screening test     Mini-CogTM Copyright MARGARITO Waterman. Licensed by the author for use in [facility]; reprinted with permission (renato@[facility]). All rights reserved.      Do you have sleep apnea, excessive snoring or daytime drowsiness?: no    Reviewed and updated as needed this visit by clinical staff  Tobacco  Allergies  Meds  Problems  Med Hx  Surg Hx  Fam Hx          Reviewed and updated as needed this visit by Provider  Tobacco  Allergies  Meds  Problems  Med Hx  Surg Hx  Fam Hx         Social History     Tobacco Use     Smoking status: Never Smoker     Smokeless tobacco: Never Used   Substance Use Topics     Alcohol use: No         Alcohol Use 9/21/2021   Prescreen: >3 drinks/day or >7 drinks/week? No         Current providers sharing in care for this patient include:  Patient Care Team:  Allison Belle NP as PCP - Joceline Anglin MD as Assigned PCP    The following health maintenance items are reviewed in Epic and correct as of today:  Health Maintenance Due   Topic Date Due     ANNUAL REVIEW OF HM ORDERS  Never done     ZOSTER IMMUNIZATION (2 of 2) 05/07/2015     DTAP/TDAP/TD IMMUNIZATION (2 - Td or Tdap) 05/04/2021         OBJECTIVE:   /88   Pulse 69   Ht 1.524 m (5')   Wt 70.3 kg (155 lb)   SpO2 96%   BMI 30.27 kg/m   Estimated body mass index is 30.27 kg/m  as calculated from the following:    Height as of this encounter: 1.524 m (5').    Weight as of this encounter: 70.3 kg (155 lb).  Physical Exam  GENERAL: healthy, alert and no distress  EYES: Eyes grossly normal to inspection, PERRL and conjunctivae and sclerae normal  HENT: ear canals and TM's normal, nose and mouth without ulcers or lesions  NECK: no adenopathy, no asymmetry, masses, or scars and thyroid normal to palpation  RESP: lungs clear to  auscultation - no rales, rhonchi or wheezes  CV: regular rate and rhythm, normal S1 S2, no S3 or S4, no murmur, click or rub, no peripheral edema and peripheral pulses strong  ABDOMEN: soft, nontender, no hepatosplenomegaly, no masses and bowel sounds normal        ASSESSMENT / PLAN:     Problem List Items Addressed This Visit        Endocrine    HLD (hyperlipidemia)     Takes a statin for primary prevention. Continue atorvastatin. He declines to repeat a lipid panel today. Advised that he will need to blood work in another 6 months         Relevant Medications    atorvastatin (LIPITOR) 40 MG tablet       Circulatory    HTN (hypertension)     Stable   He will need a metabolic panel next year, advised 6 month follow up. He is concerned about doing labs too often but agrees to do so in March 2022.          Relevant Medications    lisinopril-hydrochlorothiazide (ZESTORETIC) 20-25 MG tablet       Urinary    Chronic kidney disease, stage 3     Repeat renal panel at next office visit in 6 months             Hematologic    Elevated hemoglobin (H)- see note 1/2021     Pt declines to do any additional lab wok at this time            Other Visit Diagnoses     Encounter for Medicare annual wellness exam    -  Primary          Patient has been advised of split billing requirements and indicates understanding: Yes  COUNSELING:  Reviewed preventive health counseling, as reflected in patient instructions    Estimated body mass index is 30.27 kg/m  as calculated from the following:    Height as of this encounter: 1.524 m (5').    Weight as of this encounter: 70.3 kg (155 lb).    Weight management plan: Discussed healthy diet and exercise guidelines     Wt Readings from Last 5 Encounters:   09/21/21 70.3 kg (155 lb)   05/17/21 69.4 kg (153 lb)   01/08/21 69.2 kg (152 lb 9.6 oz)   05/29/19 69.4 kg (153 lb)   06/07/18 68.5 kg (151 lb)       He reports that he has never smoked. He has never used smokeless tobacco.      Appropriate  preventive services were discussed with this patient, including applicable screening as appropriate for cardiovascular disease, diabetes, osteopenia/osteoporosis, and glaucoma.  As appropriate for age/gender, discussed screening for colorectal cancer, prostate cancer, breast cancer, and cervical cancer. Checklist reviewing preventive services available has been given to the patient.    Reviewed patients plan of care and provided an AVS. The Basic Care Plan (routine screening as documented in Health Maintenance) for Colin meets the Care Plan requirement. This Care Plan has been established and reviewed with the Patient and daughter.    Counseling Resources:  ATP IV Guidelines  Pooled Cohorts Equation Calculator  Breast Cancer Risk Calculator  Breast Cancer: Medication to Reduce Risk  FRAX Risk Assessment  ICSI Preventive Guidelines  Dietary Guidelines for Americans, 2010  USDA's MyPlate  ASA Prophylaxis  Lung CA Screening    Allison Belle NP  [facility]    Identified Health Risks:    The patient was provided with written information regarding signs of hearing loss.

## 2021-09-21 NOTE — ASSESSMENT & PLAN NOTE
Stable   He will need a metabolic panel next year, advised 6 month follow up. He is concerned about doing labs too often but agrees to do so in March 2022.

## 2021-09-21 NOTE — PATIENT INSTRUCTIONS
You are due for a tetanus vaccine. I recommend that you get this at the pharmacy     Patient Education   Personalized Prevention Plan  You are due for the preventive services outlined below.  Your care team is available to assist you in scheduling these services.  If you have already completed any of these items, please share that information with your care team to update in your medical record.  Health Maintenance Due   Topic Date Due     ANNUAL REVIEW OF HM ORDERS  Never done     Zoster (Shingles) Vaccine (2 of 2) 05/07/2015     Diptheria Tetanus Pertussis (DTAP/TDAP/TD) Vaccine (2 - Td or Tdap) 05/04/2021       Signs of Hearing Loss      Hearing much better with one ear can be a sign of hearing loss.   Hearing loss is a problem shared by many people. In fact, it is one of the most common health problems, particularly as people age. Most people age 65 and older have some hearing loss. By age 80, almost everyone does. Hearing loss often occurs slowly over the years. So you may not realize your hearing has gotten worse.  Have your hearing checked  Call your healthcare provider if you:    Have to strain to hear normal conversation    Have to watch other people s faces very carefully to follow what they re saying    Need to ask people to repeat what they ve said    Often misunderstand what people are saying    Turn the volume of the television or radio up so high that others complain    Feel that people are mumbling when they re talking to you    Find that the effort to hear leaves you feeling tired and irritated    Notice, when using the phone, that you hear better with one ear than the other  [place] last reviewed this educational content on 1/1/2020 2000-2021 [employer]. All rights reserved. This information is not intended as a substitute for professional medical care. Always follow your healthcare professional's instructions.

## 2021-09-21 NOTE — ASSESSMENT & PLAN NOTE
Takes a statin for primary prevention. Continue atorvastatin. He declines to repeat a lipid panel today. Advised that he will need to blood work in another 6 months

## 2021-09-26 RX ORDER — ATORVASTATIN CALCIUM 40 MG/1
40 TABLET, FILM COATED ORAL DAILY
Qty: 90 TABLET | Refills: 1 | Status: SHIPPED | OUTPATIENT
Start: 2021-09-26 | End: 2022-08-07

## 2021-09-26 RX ORDER — LISINOPRIL AND HYDROCHLOROTHIAZIDE 20; 25 MG/1; MG/1
1 TABLET ORAL DAILY
Qty: 90 TABLET | Refills: 1 | Status: SHIPPED | OUTPATIENT
Start: 2021-09-26 | End: 2022-03-29

## 2022-05-06 ENCOUNTER — OFFICE VISIT (OUTPATIENT)
Dept: FAMILY MEDICINE | Facility: CLINIC | Age: 87
End: 2022-05-06
Payer: COMMERCIAL

## 2022-05-06 VITALS
WEIGHT: 155.5 LBS | OXYGEN SATURATION: 99 % | HEART RATE: 89 BPM | DIASTOLIC BLOOD PRESSURE: 68 MMHG | BODY MASS INDEX: 30.53 KG/M2 | TEMPERATURE: 98.4 F | HEIGHT: 60 IN | SYSTOLIC BLOOD PRESSURE: 128 MMHG

## 2022-05-06 DIAGNOSIS — Z13.220 SCREENING FOR HYPERLIPIDEMIA: ICD-10-CM

## 2022-05-06 DIAGNOSIS — I71.21 THORACIC ASCENDING AORTIC ANEURYSM (H): ICD-10-CM

## 2022-05-06 DIAGNOSIS — E03.8 SUBCLINICAL HYPOTHYROIDISM: ICD-10-CM

## 2022-05-06 DIAGNOSIS — D58.2 ELEVATED HEMOGLOBIN (H): ICD-10-CM

## 2022-05-06 DIAGNOSIS — E80.6 HYPERBILIRUBINEMIA: ICD-10-CM

## 2022-05-06 DIAGNOSIS — Z23 ENCOUNTER FOR IMMUNIZATION: ICD-10-CM

## 2022-05-06 DIAGNOSIS — N18.31 STAGE 3A CHRONIC KIDNEY DISEASE (H): ICD-10-CM

## 2022-05-06 DIAGNOSIS — I10 ESSENTIAL HYPERTENSION: Primary | ICD-10-CM

## 2022-05-06 LAB
ANION GAP SERPL CALCULATED.3IONS-SCNC: 14 MMOL/L (ref 5–18)
BUN SERPL-MCNC: 32 MG/DL (ref 8–28)
CALCIUM SERPL-MCNC: 9.8 MG/DL (ref 8.5–10.5)
CHLORIDE BLD-SCNC: 104 MMOL/L (ref 98–107)
CHOLEST SERPL-MCNC: 155 MG/DL
CO2 SERPL-SCNC: 23 MMOL/L (ref 22–31)
CREAT SERPL-MCNC: 1.87 MG/DL (ref 0.7–1.3)
CREAT UR-MCNC: 243 MG/DL
ERYTHROCYTE [DISTWIDTH] IN BLOOD BY AUTOMATED COUNT: 12.7 % (ref 10–15)
FASTING STATUS PATIENT QL REPORTED: ABNORMAL
GFR SERPL CREATININE-BSD FRML MDRD: 35 ML/MIN/1.73M2
GLUCOSE BLD-MCNC: 117 MG/DL (ref 70–125)
HCT VFR BLD AUTO: 51.1 % (ref 40–53)
HDLC SERPL-MCNC: 39 MG/DL
HGB BLD-MCNC: 17.1 G/DL (ref 13.3–17.7)
LDLC SERPL CALC-MCNC: 72 MG/DL
MCH RBC QN AUTO: 32.3 PG (ref 26.5–33)
MCHC RBC AUTO-ENTMCNC: 33.5 G/DL (ref 31.5–36.5)
MCV RBC AUTO: 96 FL (ref 78–100)
MICROALBUMIN UR-MCNC: 13.97 MG/DL (ref 0–1.99)
MICROALBUMIN/CREAT UR: 57.5 MG/G CR
PLATELET # BLD AUTO: 150 10E3/UL (ref 150–450)
POTASSIUM BLD-SCNC: 3.3 MMOL/L (ref 3.5–5)
RBC # BLD AUTO: 5.3 10E6/UL (ref 4.4–5.9)
SODIUM SERPL-SCNC: 141 MMOL/L (ref 136–145)
TRIGL SERPL-MCNC: 218 MG/DL
TSH SERPL DL<=0.005 MIU/L-ACNC: 4.03 UIU/ML (ref 0.3–5)
WBC # BLD AUTO: 7 10E3/UL (ref 4–11)

## 2022-05-06 PROCEDURE — 80061 LIPID PANEL: CPT | Performed by: STUDENT IN AN ORGANIZED HEALTH CARE EDUCATION/TRAINING PROGRAM

## 2022-05-06 PROCEDURE — 36415 COLL VENOUS BLD VENIPUNCTURE: CPT | Performed by: STUDENT IN AN ORGANIZED HEALTH CARE EDUCATION/TRAINING PROGRAM

## 2022-05-06 PROCEDURE — 84443 ASSAY THYROID STIM HORMONE: CPT | Performed by: STUDENT IN AN ORGANIZED HEALTH CARE EDUCATION/TRAINING PROGRAM

## 2022-05-06 PROCEDURE — 82043 UR ALBUMIN QUANTITATIVE: CPT | Performed by: STUDENT IN AN ORGANIZED HEALTH CARE EDUCATION/TRAINING PROGRAM

## 2022-05-06 PROCEDURE — 80048 BASIC METABOLIC PNL TOTAL CA: CPT | Performed by: STUDENT IN AN ORGANIZED HEALTH CARE EDUCATION/TRAINING PROGRAM

## 2022-05-06 PROCEDURE — G0009 ADMIN PNEUMOCOCCAL VACCINE: HCPCS | Performed by: STUDENT IN AN ORGANIZED HEALTH CARE EDUCATION/TRAINING PROGRAM

## 2022-05-06 PROCEDURE — 90750 HZV VACC RECOMBINANT IM: CPT | Performed by: STUDENT IN AN ORGANIZED HEALTH CARE EDUCATION/TRAINING PROGRAM

## 2022-05-06 PROCEDURE — 90670 PCV13 VACCINE IM: CPT | Performed by: STUDENT IN AN ORGANIZED HEALTH CARE EDUCATION/TRAINING PROGRAM

## 2022-05-06 PROCEDURE — 99214 OFFICE O/P EST MOD 30 MIN: CPT | Mod: 25 | Performed by: STUDENT IN AN ORGANIZED HEALTH CARE EDUCATION/TRAINING PROGRAM

## 2022-05-06 PROCEDURE — 90472 IMMUNIZATION ADMIN EACH ADD: CPT | Performed by: STUDENT IN AN ORGANIZED HEALTH CARE EDUCATION/TRAINING PROGRAM

## 2022-05-06 PROCEDURE — 85027 COMPLETE CBC AUTOMATED: CPT | Performed by: STUDENT IN AN ORGANIZED HEALTH CARE EDUCATION/TRAINING PROGRAM

## 2022-05-06 ASSESSMENT — ACTIVITIES OF DAILY LIVING (ADL)
CURRENT_FUNCTION: BATHING REQUIRES ASSISTANCE
CURRENT_FUNCTION: PREPARING MEALS REQUIRES ASSISTANCE
CURRENT_FUNCTION: LAUNDRY REQUIRES ASSISTANCE
CURRENT_FUNCTION: HOUSEWORK REQUIRES ASSISTANCE
CURRENT_FUNCTION: SHOPPING REQUIRES ASSISTANCE

## 2022-05-06 NOTE — LETTER
May 16, 2022      Colin Car  [address] [address]        Dear ,    We are writing to inform you of your test results.    Call patient:     - cholesterol is worse that the last time it was checked - would like to increase the amount of exercise she is doing and continue atorvastatin     - kidney function has worsened quite a bit since last time and there is evidence of low potassium. It also looks like patient is not drinking enough water. I would like her to increase how much was ter she is drinking, increase potassium high foods like bananas in her diet. I am also sending some potassium supplementation to the pharmacy for her to . I would like her to come back next week to lab for repeat BMP     - thyroid has normalized     - CBC ha normalized.       Resulted Orders   Lipid panel reflex to direct LDL Non-fasting   Result Value Ref Range    Cholesterol 155 <=199 mg/dL    Triglycerides 218 (H) <=149 mg/dL    Direct Measure HDL 39 (L) >=40 mg/dL      Comment:      HDL Cholesterol Reference Range:     0-2 years:   No reference ranges established for patients under 2 years old  at [facility] for lipid analytes.    2-8 years:  Greater than 45 mg/dL     18 years and older:   Female: Greater than or equal to 50 mg/dL   Male:   Greater than or equal to 40 mg/dL    LDL Cholesterol Calculated 72 <=129 mg/dL    Patient Fasting > 8hrs? Unknown    Basic metabolic panel  (Ca, Cl, CO2, Creat, Gluc, K, Na, BUN)   Result Value Ref Range    Sodium 141 136 - 145 mmol/L    Potassium 3.3 (L) 3.5 - 5.0 mmol/L    Chloride 104 98 - 107 mmol/L    Carbon Dioxide (CO2) 23 22 - 31 mmol/L    Anion Gap 14 5 - 18 mmol/L    Urea Nitrogen 32 (H) 8 - 28 mg/dL    Creatinine 1.87 (H) 0.70 - 1.30 mg/dL    Calcium 9.8 8.5 - 10.5 mg/dL    Glucose 117 70 - 125 mg/dL    GFR Estimate 35 (L) >60 mL/min/1.73m2      Comment:      Effective December 21, 2021 eGFRcr in adults is calculated using the 2021  CKD-EPI creatinine equation which includes age and gender (Lela early al., [facility], DOI: 10.1056/KPPIty0756247)   CBC with platelets   Result Value Ref Range    WBC Count 7.0 4.0 - 11.0 10e3/uL    RBC Count 5.30 4.40 - 5.90 10e6/uL    Hemoglobin 17.1 13.3 - 17.7 g/dL    Hematocrit 51.1 40.0 - 53.0 %    MCV 96 78 - 100 fL    MCH 32.3 26.5 - 33.0 pg    MCHC 33.5 31.5 - 36.5 g/dL    RDW 12.7 10.0 - 15.0 %    Platelet Count 150 150 - 450 10e3/uL   TSH with free T4 reflex   Result Value Ref Range    TSH 4.03 0.30 - 5.00 uIU/mL   Albumin Random Urine Quantitative with Creat Ratio   Result Value Ref Range    Microalbumin Urine mg/dL 13.97 (H) 0.00 - 1.99 mg/dL    Creatinine Urine mg/dL 243 mg/dL    Microalbumin Urine mg/g Cr 57.5 (H) <=19.9 mg/g Cr    Narrative    Microalbumin, Random Urine   <2.0 mg/dL . . . . . . . . Normal   3.0-30.0 mg/dL . . . . . . Microalbuminuria   >30.0 mg/dL . . . . . .  . Clinical Proteinuria     Microalbumin/Creatinine Ratio, Random Urine   <20 mg/g . . . . .. . . . Normal    mg/g . . . . . . . Microalbuminuria   >300 mg/g . . . . . . . . Clinical Proteinuria       If you have any questions or concerns, please call the clinic at the number listed above.       Sincerely,      Cristal Nicholson, DO

## 2022-05-11 DIAGNOSIS — E87.6 HYPOKALEMIA: Primary | ICD-10-CM

## 2022-05-11 RX ORDER — POTASSIUM CHLORIDE 1.5 G/1.58G
20 POWDER, FOR SOLUTION ORAL 2 TIMES DAILY
Qty: 4 PACKET | Refills: 0 | Status: SHIPPED | OUTPATIENT
Start: 2022-05-11 | End: 2022-05-13

## 2022-05-25 ENCOUNTER — APPOINTMENT (OUTPATIENT)
Dept: INTERPRETER SERVICES | Facility: CLINIC | Age: 87
End: 2022-05-25
Payer: COMMERCIAL

## 2022-05-29 ENCOUNTER — HEALTH MAINTENANCE LETTER (OUTPATIENT)
Age: 87
End: 2022-05-29

## 2022-06-10 ENCOUNTER — HOSPITAL ENCOUNTER (OUTPATIENT)
Dept: CT IMAGING | Facility: HOSPITAL | Age: 87
Discharge: HOME | End: 2022-06-10
Attending: STUDENT IN AN ORGANIZED HEALTH CARE EDUCATION/TRAINING PROGRAM | Admitting: STUDENT IN AN ORGANIZED HEALTH CARE EDUCATION/TRAINING PROGRAM
Payer: COMMERCIAL

## 2022-06-10 DIAGNOSIS — I71.21 THORACIC ASCENDING AORTIC ANEURYSM (H): ICD-10-CM

## 2022-06-10 LAB
CREAT BLD-MCNC: 1.5 MG/DL (ref 0.7–1.3)
GFR SERPL CREATININE-BSD FRML MDRD: 45 ML/MIN/1.73M2

## 2022-06-10 PROCEDURE — 250N000011 HC RX IP 250 OP 636: Performed by: STUDENT IN AN ORGANIZED HEALTH CARE EDUCATION/TRAINING PROGRAM

## 2022-06-10 PROCEDURE — 71275 CT ANGIOGRAPHY CHEST: CPT

## 2022-06-10 PROCEDURE — 82565 ASSAY OF CREATININE: CPT

## 2022-06-10 PROCEDURE — 74175 CTA ABDOMEN W/CONTRAST: CPT

## 2022-06-10 RX ORDER — IOPAMIDOL 755 MG/ML
75 INJECTION, SOLUTION INTRAVASCULAR ONCE
Status: COMPLETED | OUTPATIENT
Start: 2022-06-10 | End: 2022-06-10

## 2022-06-10 RX ADMIN — IOPAMIDOL 75 ML: 755 INJECTION, SOLUTION INTRAVENOUS at 13:40

## 2022-08-07 DIAGNOSIS — E78.2 MIXED HYPERLIPIDEMIA: ICD-10-CM

## 2022-08-07 RX ORDER — ATORVASTATIN CALCIUM 40 MG/1
TABLET, FILM COATED ORAL
Qty: 90 TABLET | Refills: 2 | Status: SHIPPED | OUTPATIENT
Start: 2022-08-07 | End: 2023-05-22

## 2022-08-07 NOTE — TELEPHONE ENCOUNTER
"Last Written Prescription Date:  9/26/21  Last Fill Quantity: 90,  # refills: 1   Last office visit provider:  5/6/22     Requested Prescriptions   Pending Prescriptions Disp Refills     atorvastatin (LIPITOR) 40 MG tablet [Pharmacy Med Name: ATORVASTATIN CALCIUM 40 MG 40 Tablet] 90 tablet 1     Sig: TAKE 1 PILL BY MOUTH EVERY DAY/NORMA HNUB NOJ 1 LUB TSHUAJ PAB NTSHAV MUAJ ROJ       Statins Protocol Passed - 8/7/2022 10:08 AM        Passed - LDL on file in past 12 months     Recent Labs   Lab Test 05/06/22  1427   LDL 72             Passed - No abnormal creatine kinase in past 12 months     No lab results found.             Passed - Recent (12 mo) or future (30 days) visit within the authorizing provider's specialty     Patient has had an office visit with the authorizing provider or a provider within the authorizing providers department within the previous 12 mos or has a future within next 30 days. See \"Patient Info\" tab in inbasket, or \"Choose Columns\" in Meds & Orders section of the refill encounter.              Passed - Medication is active on med list        Passed - Patient is age 18 or older             Rossi Yañez, RN 08/07/22 5:33 PM  "

## 2022-09-23 ENCOUNTER — TELEPHONE (OUTPATIENT)
Dept: INTERNAL MEDICINE | Facility: CLINIC | Age: 87
End: 2022-09-23

## 2022-09-26 ENCOUNTER — OFFICE VISIT (OUTPATIENT)
Dept: INTERNAL MEDICINE | Facility: CLINIC | Age: 87
End: 2022-09-26
Payer: COMMERCIAL

## 2022-09-26 VITALS
HEART RATE: 73 BPM | HEIGHT: 60 IN | OXYGEN SATURATION: 98 % | DIASTOLIC BLOOD PRESSURE: 80 MMHG | WEIGHT: 156 LBS | SYSTOLIC BLOOD PRESSURE: 136 MMHG | TEMPERATURE: 97.8 F | BODY MASS INDEX: 30.63 KG/M2

## 2022-09-26 DIAGNOSIS — I10 PRIMARY HYPERTENSION: ICD-10-CM

## 2022-09-26 DIAGNOSIS — N18.31 STAGE 3A CHRONIC KIDNEY DISEASE (H): ICD-10-CM

## 2022-09-26 DIAGNOSIS — Z00.00 ENCOUNTER FOR MEDICARE ANNUAL WELLNESS EXAM: Primary | ICD-10-CM

## 2022-09-26 DIAGNOSIS — E03.8 SUBCLINICAL HYPOTHYROIDISM: ICD-10-CM

## 2022-09-26 DIAGNOSIS — E78.2 MIXED HYPERLIPIDEMIA: ICD-10-CM

## 2022-09-26 LAB
ANION GAP SERPL CALCULATED.3IONS-SCNC: 10 MMOL/L (ref 7–15)
BUN SERPL-MCNC: 21 MG/DL (ref 8–23)
CALCIUM SERPL-MCNC: 9.3 MG/DL (ref 8.8–10.2)
CHLORIDE SERPL-SCNC: 106 MMOL/L (ref 98–107)
CREAT SERPL-MCNC: 1.16 MG/DL (ref 0.67–1.17)
DEPRECATED HCO3 PLAS-SCNC: 26 MMOL/L (ref 22–29)
GFR SERPL CREATININE-BSD FRML MDRD: 62 ML/MIN/1.73M2
GLUCOSE SERPL-MCNC: 125 MG/DL (ref 70–99)
POTASSIUM SERPL-SCNC: 4.2 MMOL/L (ref 3.4–5.3)
SODIUM SERPL-SCNC: 142 MMOL/L (ref 136–145)

## 2022-09-26 PROCEDURE — G0008 ADMIN INFLUENZA VIRUS VAC: HCPCS | Performed by: NURSE PRACTITIONER

## 2022-09-26 PROCEDURE — 90662 IIV NO PRSV INCREASED AG IM: CPT | Performed by: NURSE PRACTITIONER

## 2022-09-26 PROCEDURE — 36415 COLL VENOUS BLD VENIPUNCTURE: CPT | Performed by: NURSE PRACTITIONER

## 2022-09-26 PROCEDURE — 80048 BASIC METABOLIC PNL TOTAL CA: CPT | Performed by: NURSE PRACTITIONER

## 2022-09-26 PROCEDURE — 99214 OFFICE O/P EST MOD 30 MIN: CPT | Mod: 25 | Performed by: NURSE PRACTITIONER

## 2022-09-26 PROCEDURE — G0439 PPPS, SUBSEQ VISIT: HCPCS | Performed by: NURSE PRACTITIONER

## 2022-09-26 RX ORDER — LISINOPRIL AND HYDROCHLOROTHIAZIDE 20; 25 MG/1; MG/1
1 TABLET ORAL DAILY
Qty: 90 TABLET | Refills: 1 | Status: SHIPPED | OUTPATIENT
Start: 2022-09-26 | End: 2023-04-11

## 2022-09-26 ASSESSMENT — ENCOUNTER SYMPTOMS
DIARRHEA: 0
SORE THROAT: 0
PARESTHESIAS: 0
CONSTIPATION: 0
SHORTNESS OF BREATH: 1
EYE PAIN: 0
HEMATOCHEZIA: 0
ARTHRALGIAS: 1
WEAKNESS: 1
FEVER: 0
NAUSEA: 0
DYSURIA: 0
HEADACHES: 0
CHILLS: 0
ABDOMINAL PAIN: 0
HEMATURIA: 0
PALPITATIONS: 0
COUGH: 0
HEARTBURN: 0
FREQUENCY: 0
JOINT SWELLING: 1
DIZZINESS: 0
NERVOUS/ANXIOUS: 0
MYALGIAS: 1

## 2022-09-26 ASSESSMENT — ACTIVITIES OF DAILY LIVING (ADL)
CURRENT_FUNCTION: TRANSPORTATION REQUIRES ASSISTANCE
CURRENT_FUNCTION: TELEPHONE REQUIRES ASSISTANCE

## 2022-09-26 ASSESSMENT — PAIN SCALES - GENERAL: PAINLEVEL: NO PAIN (0)

## 2022-09-26 NOTE — PATIENT INSTRUCTIONS
Patient Education   Personalized Prevention Plan  You are due for the preventive services outlined below.  Your care team is available to assist you in scheduling these services.  If you have already completed any of these items, please share that information with your care team to update in your medical record.  Health Maintenance Due   Topic Date Due     ANNUAL REVIEW OF HM ORDERS  Never done     COVID-19 Vaccine (4 - Booster for Moderna series) 03/31/2022     Flu Vaccine (1) 09/01/2022     Activities of Daily Living    Your Health Risk Assessment indicates you have difficulties with activities of daily living such as housework, bathing, preparing meals, taking medication, etc. Please make a follow up appointment for us to address this issue in more detail.    Signs of Hearing Loss      Hearing much better with one ear can be a sign of hearing loss.   Hearing loss is a problem shared by many people. In fact, it is one of the most common health problems, particularly as people age. Most people age 65 and older have some hearing loss. By age 80, almost everyone does. Hearing loss often occurs slowly over the years. So you may not realize your hearing has gotten worse.  Have your hearing checked  Call your healthcare provider if you:    Have to strain to hear normal conversation    Have to watch other people s faces very carefully to follow what they re saying    Need to ask people to repeat what they ve said    Often misunderstand what people are saying    Turn the volume of the television or radio up so high that others complain    Feel that people are mumbling when they re talking to you    Find that the effort to hear leaves you feeling tired and irritated    Notice, when using the phone, that you hear better with one ear than the other  [employer] last reviewed this educational content on 1/1/2020 2000-2021 [employer]. All rights reserved. This information is not intended as a substitute for  professional medical care. Always follow your healthcare professional's instructions.

## 2022-09-26 NOTE — PROGRESS NOTES
"SUBJECTIVE:   Colin is a 87 year old who presents for Preventive Visit.  He is accompanied today by his Grandson, Yasmine.  A professional telephone  was utilized for today's visit.    HTN- BP is elevated today. He ran out of his medication about 1 week ago.     Elevated creatinine was noted at his last lab draw. He admits that he doesn't drink a lot of water, he states that he relies on food for much of his fluid intake such as vegetables and beans and drinks other fluids besides water sparingly.     His memory screening test was positive.  He notes some changes in his memory such as forgetfulness in regards to coming up with a word or person's name does not think that he has a pathologic memory change in comparison to others his age.  He has not had any safety issues such as wandering or leaving the stove on.    He notes some difficulty hearing. He is not interested in a hearing assessment at this time.     Patient has been advised of split billing requirements and indicates understanding: Yes  Are you in the first 12 months of your Medicare coverage?  No    Healthy Habits:     In general, how would you rate your overall health?  Good    Frequency of exercise:  2-3 days/week    Duration of exercise:  15-30 minutes    Do you usually eat at least 4 servings of fruit and vegetables a day, include whole grains    & fiber and avoid regularly eating high fat or \"junk\" foods?  Yes    Taking medications regularly:  Yes    Medication side effects:  None    Ability to successfully perform activities of daily living:  Telephone requires assistance and transportation requires assistance    Home Safety:  No safety concerns identified    Hearing Impairment:  Need to ask people to speak up or repeat themselves and difficulty understanding soft or whispered speech    In the past 6 months, have you been bothered by leaking of urine?  No    In general, how would you rate your overall mental or emotional health?  Good      " PHQ-2 Total Score: 0    Additional concerns today:  No    Do you feel safe in your environment? Yes    Have you ever done Advance Care Planning? (For example, a Health Directive, POLST, or a discussion with a medical provider or your loved ones about your wishes): No, advance care planning information given to patient to review.  Patient declined advance care planning discussion at this time.       Fall risk  Fallen 2 or more times in the past year?: No  Any fall with injury in the past year?: No    Cognitive Screening   1) Repeat 3 items (Leader, Season, Table)    2) Clock draw: Unable to complete clock due to language barrier  3) 3 item recall: Recalls NO objects   Results: 0 items recalled: PROBABLE COGNITIVE IMPAIRMENT, **INFORM PROVIDER**- language barrier    Mini-CogTM Copyright MARGARITO Waterman. Licensed by the author for use in [facility]; reprinted with permission (renato@[facility]). All rights reserved.      Do you have sleep apnea, excessive snoring or daytime drowsiness?: no    Reviewed and updated as needed this visit by clinical staff   Tobacco  Allergies  Meds  Problems  Med Hx  Surg Hx  Fam Hx  Soc   Hx          Reviewed and updated as needed this visit by Provider   Tobacco  Allergies  Meds  Problems  Med Hx  Surg Hx  Fam Hx           Social History     Tobacco Use     Smoking status: Never Smoker     Smokeless tobacco: Never Used   Substance Use Topics     Alcohol use: No     If you drink alcohol do you typically have >3 drinks per day or >7 drinks per week? No    Alcohol Use 9/26/2022   Prescreen: >3 drinks/day or >7 drinks/week? No   Prescreen: >3 drinks/day or >7 drinks/week? -         Current providers sharing in care for this patient include:   Patient Care Team:  Allison Belle NP as PCP - General  Allison Belle NP as Assigned PCP    The following health maintenance items are reviewed in Epic and correct as of today:  Health Maintenance   Topic Date Due     ANNUAL REVIEW OF   ORDERS  Never done     COVID-19 Vaccine (4 - Booster for Moderna series) 03/31/2022     BMP  05/06/2023     LIPID  05/06/2023     MICROALBUMIN  05/06/2023     HEMOGLOBIN  05/06/2023     MEDICARE ANNUAL WELLNESS VISIT  09/26/2023     FALL RISK ASSESSMENT  09/26/2023     COLORECTAL CANCER SCREENING  11/22/2026     ADVANCE CARE PLANNING  09/26/2027     DTAP/TDAP/TD IMMUNIZATION (3 - Td or Tdap) 09/21/2031     PHQ-2 (once per calendar year)  Completed     INFLUENZA VACCINE  Completed     Pneumococcal Vaccine: 65+ Years  Completed     URINALYSIS  Completed     ZOSTER IMMUNIZATION  Addressed     IPV IMMUNIZATION  Aged Out     MENINGITIS IMMUNIZATION  Aged Out     HEPATITIS B IMMUNIZATION  Aged Out         Review of Systems   Constitutional: Negative for chills and fever.   HENT: Negative for congestion, ear pain, hearing loss and sore throat.    Eyes: Negative for pain and visual disturbance.   Respiratory: Positive for shortness of breath. Negative for cough.    Cardiovascular: Negative for chest pain, palpitations and peripheral edema.   Gastrointestinal: Negative for abdominal pain, constipation, diarrhea, heartburn, hematochezia and nausea.   Genitourinary: Positive for impotence. Negative for dysuria, frequency, genital sores, hematuria, penile discharge and urgency.   Musculoskeletal: Positive for arthralgias, joint swelling and myalgias.   Skin: Negative for rash.   Neurological: Positive for weakness. Negative for dizziness, headaches and paresthesias.   Psychiatric/Behavioral: Negative for mood changes. The patient is not nervous/anxious.          OBJECTIVE:   /80   Pulse 73   Temp 97.8  F (36.6  C) (Oral)   Ht 1.524 m (5')   Wt 70.8 kg (156 lb)   SpO2 98%   BMI 30.47 kg/m   Estimated body mass index is 30.47 kg/m  as calculated from the following:    Height as of this encounter: 1.524 m (5').    Weight as of this encounter: 70.8 kg (156 lb).  Physical Exam  GENERAL: Alert and no distress  RESP: lungs  clear to auscultation - no rales, rhonchi or wheezes  CV: regular rate and rhythm, normal S1 S2, no S3 or S4, no murmur, click or rub, no peripheral edema  PSYCH: mentation appears normal, affect normal/bright      ASSESSMENT / PLAN:     Problem List Items Addressed This Visit        Endocrine    Subclinical hypothyroidism     Most recent TSH normal. Repeat 4/2023           HLD (hyperlipidemia)     Continue atorvastatin              Circulatory    HTN (hypertension)     BP is elevated today, however, he has been out of his HTN medication for the past 1 week. Will resume medication at the same dose. Refills sent to pharmacy.            Relevant Medications    lisinopril-hydrochlorothiazide (ZESTORETIC) 20-25 MG tablet    Other Relevant Orders    Basic metabolic panel       Urinary    Chronic kidney disease, stage 3 (H)     Repeat renal panel today. Encouraged better hydration and avoidances of NSAID medications. Currently he states that he does not take any NSAID medications.              Other Visit Diagnoses     Encounter for Medicare annual wellness exam    -  Primary          COUNSELING:  Reviewed preventive health counseling, as reflected in patient instructions    Estimated body mass index is 30.47 kg/m  as calculated from the following:    Height as of this encounter: 1.524 m (5').    Weight as of this encounter: 70.8 kg (156 lb).    Weight management plan: Discussed healthy diet and exercise guidelines    He reports that he has never smoked. He has never used smokeless tobacco.      Appropriate preventive services were discussed with this patient, including applicable screening as appropriate for cardiovascular disease, diabetes, osteopenia/osteoporosis, and glaucoma.  As appropriate for age/gender, discussed screening for colorectal cancer, prostate cancer, breast cancer, and cervical cancer. Checklist reviewing preventive services available has been given to the patient.    Reviewed patients plan of care  and provided an AVS. The Basic Care Plan (routine screening as documented in Health Maintenance) for Colin meets the Care Plan requirement. This Care Plan has been established and reviewed with the Patient.    Counseling Resources:  ATP IV Guidelines  Pooled Cohorts Equation Calculator  Breast Cancer Risk Calculator  Breast Cancer: Medication to Reduce Risk  FRAX Risk Assessment  ICSI Preventive Guidelines  Dietary Guidelines for Americans, 2010  [employer]'s MyPlate  ASA Prophylaxis  Lung CA Screening    Allison Belle NP  [facility]    Identified Health Risks:    The patient reports that he has difficulty with activities of daily living. He declines further assistance at this time, has appropriate help from family.   The patient was provided with written information regarding signs of hearing loss. He declines a hearing assessment referral at this time.

## 2022-09-26 NOTE — ASSESSMENT & PLAN NOTE
BP is elevated today, however, he has been out of his HTN medication for the past 1 week. Will resume medication at the same dose. Refills sent to pharmacy.

## 2022-09-26 NOTE — ASSESSMENT & PLAN NOTE
Repeat renal panel today. Encouraged better hydration and avoidances of NSAID medications. Currently he states that he does not take any NSAID medications.

## 2022-09-27 NOTE — TELEPHONE ENCOUNTER
Received fax from [facility] regarding a medical report needed to be filled out and faxed back to [phone]      Forms placed on PCP's desk for completion.       Lorenzo Johnson Jr., CMA on 9/27/2022 at 7:12 AM

## 2022-12-04 ENCOUNTER — APPOINTMENT (OUTPATIENT)
Dept: RADIOLOGY | Facility: HOSPITAL | Age: 87
DRG: 190 | End: 2022-12-04
Attending: EMERGENCY MEDICINE
Payer: COMMERCIAL

## 2022-12-04 ENCOUNTER — HOSPITAL ENCOUNTER (INPATIENT)
Facility: HOSPITAL | Age: 87
LOS: 3 days | Discharge: HOME | DRG: 190 | End: 2022-12-07
Attending: EMERGENCY MEDICINE | Admitting: FAMILY MEDICINE
Payer: COMMERCIAL

## 2022-12-04 DIAGNOSIS — J20.5 ACUTE BRONCHITIS DUE TO RESPIRATORY SYNCYTIAL VIRUS (RSV): ICD-10-CM

## 2022-12-04 DIAGNOSIS — J96.02 ACUTE RESPIRATORY FAILURE WITH HYPERCAPNIA (H): ICD-10-CM

## 2022-12-04 LAB
ALBUMIN SERPL BCG-MCNC: 4.6 G/DL (ref 3.5–5.2)
ALP SERPL-CCNC: 70 U/L (ref 40–129)
ALT SERPL W P-5'-P-CCNC: 23 U/L (ref 10–50)
ANION GAP SERPL CALCULATED.3IONS-SCNC: 13 MMOL/L (ref 7–15)
AST SERPL W P-5'-P-CCNC: 39 U/L (ref 10–50)
BASE EXCESS BLDA CALC-SCNC: -4 MMOL/L
BASE EXCESS BLDV CALC-SCNC: 1.4 MMOL/L
BASOPHILS # BLD AUTO: 0.1 10E3/UL (ref 0–0.2)
BASOPHILS NFR BLD AUTO: 1 %
BILIRUB DIRECT SERPL-MCNC: 0.23 MG/DL (ref 0–0.3)
BILIRUB SERPL-MCNC: 1 MG/DL
BUN SERPL-MCNC: 32.5 MG/DL (ref 8–23)
CALCIUM SERPL-MCNC: 9.3 MG/DL (ref 8.8–10.2)
CHLORIDE SERPL-SCNC: 99 MMOL/L (ref 98–107)
COHGB MFR BLD: 97.3 % (ref 95–96)
CREAT SERPL-MCNC: 1.6 MG/DL (ref 0.67–1.17)
DEPRECATED HCO3 PLAS-SCNC: 25 MMOL/L (ref 22–29)
EOSINOPHIL # BLD AUTO: 0.1 10E3/UL (ref 0–0.7)
EOSINOPHIL NFR BLD AUTO: 1 %
ERYTHROCYTE [DISTWIDTH] IN BLOOD BY AUTOMATED COUNT: 13.1 % (ref 10–15)
FLUAV RNA SPEC QL NAA+PROBE: NEGATIVE
FLUBV RNA RESP QL NAA+PROBE: NEGATIVE
GFR SERPL CREATININE-BSD FRML MDRD: 42 ML/MIN/1.73M2
GLUCOSE SERPL-MCNC: 119 MG/DL (ref 70–99)
HCO3 BLD-SCNC: 22 MMOL/L (ref 23–29)
HCO3 BLDV-SCNC: 28 MMOL/L (ref 24–30)
HCT VFR BLD AUTO: 56.3 % (ref 40–53)
HGB BLD-MCNC: 18.7 G/DL (ref 13.3–17.7)
HOLD SPECIMEN: NORMAL
IMM GRANULOCYTES # BLD: 0 10E3/UL
IMM GRANULOCYTES NFR BLD: 0 %
LACTATE SERPL-SCNC: 2.1 MMOL/L (ref 0.7–2)
LYMPHOCYTES # BLD AUTO: 3.5 10E3/UL (ref 0.8–5.3)
LYMPHOCYTES NFR BLD AUTO: 36 %
MAGNESIUM SERPL-MCNC: 2.1 MG/DL (ref 1.7–2.3)
MCH RBC QN AUTO: 32 PG (ref 26.5–33)
MCHC RBC AUTO-ENTMCNC: 33.2 G/DL (ref 31.5–36.5)
MCV RBC AUTO: 96 FL (ref 78–100)
MONOCYTES # BLD AUTO: 1 10E3/UL (ref 0–1.3)
MONOCYTES NFR BLD AUTO: 10 %
NEUTROPHILS # BLD AUTO: 5 10E3/UL (ref 1.6–8.3)
NEUTROPHILS NFR BLD AUTO: 52 %
NRBC # BLD AUTO: 0 10E3/UL
NRBC BLD AUTO-RTO: 0 /100
NT-PROBNP SERPL-MCNC: 361 PG/ML (ref 0–1800)
OXYHGB MFR BLD: 96.1 % (ref 95–96)
OXYHGB MFR BLDV: 46.1 % (ref 70–75)
PCO2 BLD: 42 MM HG (ref 35–45)
PCO2 BLDV: 59 MM HG (ref 35–50)
PH BLD: 7.33 [PH] (ref 7.37–7.44)
PH BLDV: 7.28 [PH] (ref 7.35–7.45)
PLATELET # BLD AUTO: 141 10E3/UL (ref 150–450)
PO2 BLD: 94 MM HG (ref 75–85)
PO2 BLDV: 27 MM HG (ref 25–47)
POTASSIUM SERPL-SCNC: 3.7 MMOL/L (ref 3.4–5.3)
PROT SERPL-MCNC: 8.3 G/DL (ref 6.4–8.3)
RBC # BLD AUTO: 5.84 10E6/UL (ref 4.4–5.9)
RSV RNA SPEC NAA+PROBE: POSITIVE
SAO2 % BLDV: 46.9 % (ref 70–75)
SARS-COV-2 RNA RESP QL NAA+PROBE: NEGATIVE
SODIUM SERPL-SCNC: 137 MMOL/L (ref 136–145)
TEMPERATURE: 37 DEGREES C
TROPONIN T SERPL HS-MCNC: 34 NG/L
WBC # BLD AUTO: 9.7 10E3/UL (ref 4–11)

## 2022-12-04 PROCEDURE — 36415 COLL VENOUS BLD VENIPUNCTURE: CPT | Performed by: EMERGENCY MEDICINE

## 2022-12-04 PROCEDURE — 82805 BLOOD GASES W/O2 SATURATION: CPT | Performed by: EMERGENCY MEDICINE

## 2022-12-04 PROCEDURE — 82248 BILIRUBIN DIRECT: CPT | Performed by: EMERGENCY MEDICINE

## 2022-12-04 PROCEDURE — 94640 AIRWAY INHALATION TREATMENT: CPT

## 2022-12-04 PROCEDURE — 210N000001 HC R&B IMCU HEART CARE

## 2022-12-04 PROCEDURE — 99223 1ST HOSP IP/OBS HIGH 75: CPT | Performed by: FAMILY MEDICINE

## 2022-12-04 PROCEDURE — 250N000011 HC RX IP 250 OP 636: Performed by: EMERGENCY MEDICINE

## 2022-12-04 PROCEDURE — 36600 WITHDRAWAL OF ARTERIAL BLOOD: CPT

## 2022-12-04 PROCEDURE — 82805 BLOOD GASES W/O2 SATURATION: CPT | Performed by: FAMILY MEDICINE

## 2022-12-04 PROCEDURE — 87637 SARSCOV2&INF A&B&RSV AMP PRB: CPT | Performed by: EMERGENCY MEDICINE

## 2022-12-04 PROCEDURE — 71046 X-RAY EXAM CHEST 2 VIEWS: CPT

## 2022-12-04 PROCEDURE — 94660 CPAP INITIATION&MGMT: CPT

## 2022-12-04 PROCEDURE — 99285 EMERGENCY DEPT VISIT HI MDM: CPT | Mod: CS,25

## 2022-12-04 PROCEDURE — 250N000009 HC RX 250: Performed by: EMERGENCY MEDICINE

## 2022-12-04 PROCEDURE — 94640 AIRWAY INHALATION TREATMENT: CPT | Mod: 76

## 2022-12-04 PROCEDURE — 80053 COMPREHEN METABOLIC PANEL: CPT | Performed by: EMERGENCY MEDICINE

## 2022-12-04 PROCEDURE — 87040 BLOOD CULTURE FOR BACTERIA: CPT | Performed by: EMERGENCY MEDICINE

## 2022-12-04 PROCEDURE — 999N000157 HC STATISTIC RCP TIME EA 10 MIN

## 2022-12-04 PROCEDURE — A7035 POS AIRWAY PRESS HEADGEAR: HCPCS

## 2022-12-04 PROCEDURE — 83880 ASSAY OF NATRIURETIC PEPTIDE: CPT | Performed by: EMERGENCY MEDICINE

## 2022-12-04 PROCEDURE — 85025 COMPLETE CBC W/AUTO DIFF WBC: CPT | Performed by: EMERGENCY MEDICINE

## 2022-12-04 PROCEDURE — 96374 THER/PROPH/DIAG INJ IV PUSH: CPT

## 2022-12-04 PROCEDURE — 250N000009 HC RX 250: Performed by: FAMILY MEDICINE

## 2022-12-04 PROCEDURE — 83735 ASSAY OF MAGNESIUM: CPT | Performed by: EMERGENCY MEDICINE

## 2022-12-04 PROCEDURE — 84484 ASSAY OF TROPONIN QUANT: CPT | Performed by: EMERGENCY MEDICINE

## 2022-12-04 PROCEDURE — 93005 ELECTROCARDIOGRAM TRACING: CPT | Performed by: EMERGENCY MEDICINE

## 2022-12-04 PROCEDURE — 5A09457 ASSISTANCE WITH RESPIRATORY VENTILATION, 24-96 CONSECUTIVE HOURS, CONTINUOUS POSITIVE AIRWAY PRESSURE: ICD-10-PCS | Performed by: FAMILY MEDICINE

## 2022-12-04 PROCEDURE — C9803 HOPD COVID-19 SPEC COLLECT: HCPCS

## 2022-12-04 PROCEDURE — 83605 ASSAY OF LACTIC ACID: CPT | Performed by: EMERGENCY MEDICINE

## 2022-12-04 PROCEDURE — 258N000003 HC RX IP 258 OP 636: Performed by: EMERGENCY MEDICINE

## 2022-12-04 RX ORDER — CARBOXYMETHYLCELLULOSE SODIUM 5 MG/ML
1 SOLUTION/ DROPS OPHTHALMIC
Status: DISCONTINUED | OUTPATIENT
Start: 2022-12-04 | End: 2022-12-07 | Stop reason: HOSPADM

## 2022-12-04 RX ORDER — LIDOCAINE 40 MG/G
CREAM TOPICAL
Status: DISCONTINUED | OUTPATIENT
Start: 2022-12-04 | End: 2022-12-07 | Stop reason: HOSPADM

## 2022-12-04 RX ORDER — LISINOPRIL AND HYDROCHLOROTHIAZIDE 20; 25 MG/1; MG/1
1 TABLET ORAL DAILY
Status: DISCONTINUED | OUTPATIENT
Start: 2022-12-05 | End: 2022-12-04

## 2022-12-04 RX ORDER — SODIUM CHLORIDE 9 MG/ML
INJECTION, SOLUTION INTRAVENOUS CONTINUOUS
Status: DISCONTINUED | OUTPATIENT
Start: 2022-12-04 | End: 2022-12-05

## 2022-12-04 RX ORDER — IPRATROPIUM BROMIDE AND ALBUTEROL SULFATE 2.5; .5 MG/3ML; MG/3ML
3 SOLUTION RESPIRATORY (INHALATION) ONCE
Status: COMPLETED | OUTPATIENT
Start: 2022-12-04 | End: 2022-12-04

## 2022-12-04 RX ORDER — HYDRALAZINE HYDROCHLORIDE 20 MG/ML
10 INJECTION INTRAMUSCULAR; INTRAVENOUS EVERY 6 HOURS PRN
Status: DISCONTINUED | OUTPATIENT
Start: 2022-12-04 | End: 2022-12-07 | Stop reason: HOSPADM

## 2022-12-04 RX ORDER — HYDRALAZINE HYDROCHLORIDE 20 MG/ML
10 INJECTION INTRAMUSCULAR; INTRAVENOUS EVERY 6 HOURS PRN
Status: DISCONTINUED | OUTPATIENT
Start: 2022-12-04 | End: 2022-12-04

## 2022-12-04 RX ORDER — IPRATROPIUM BROMIDE AND ALBUTEROL SULFATE 2.5; .5 MG/3ML; MG/3ML
3 SOLUTION RESPIRATORY (INHALATION)
Status: DISCONTINUED | OUTPATIENT
Start: 2022-12-04 | End: 2022-12-05

## 2022-12-04 RX ORDER — ATORVASTATIN CALCIUM 40 MG/1
40 TABLET, FILM COATED ORAL EVERY EVENING
Status: DISCONTINUED | OUTPATIENT
Start: 2022-12-04 | End: 2022-12-07 | Stop reason: HOSPADM

## 2022-12-04 RX ORDER — METHYLPREDNISOLONE SODIUM SUCCINATE 125 MG/2ML
125 INJECTION, POWDER, LYOPHILIZED, FOR SOLUTION INTRAMUSCULAR; INTRAVENOUS ONCE
Status: COMPLETED | OUTPATIENT
Start: 2022-12-04 | End: 2022-12-04

## 2022-12-04 RX ADMIN — IPRATROPIUM BROMIDE AND ALBUTEROL SULFATE 3 ML: 2.5; .5 SOLUTION RESPIRATORY (INHALATION) at 19:40

## 2022-12-04 RX ADMIN — SODIUM CHLORIDE 1000 ML: 9 INJECTION, SOLUTION INTRAVENOUS at 22:16

## 2022-12-04 RX ADMIN — IPRATROPIUM BROMIDE AND ALBUTEROL SULFATE 3 ML: 2.5; .5 SOLUTION RESPIRATORY (INHALATION) at 22:54

## 2022-12-04 RX ADMIN — METHYLPREDNISOLONE SODIUM SUCCINATE 125 MG: 125 INJECTION, POWDER, FOR SOLUTION INTRAMUSCULAR; INTRAVENOUS at 19:56

## 2022-12-04 ASSESSMENT — ENCOUNTER SYMPTOMS
BLOOD IN STOOL: 0
JOINT SWELLING: 0
ABDOMINAL PAIN: 0
DIARRHEA: 1
DIZZINESS: 0
NAUSEA: 0
CONFUSION: 0
COUGH: 1
VOMITING: 0
SHORTNESS OF BREATH: 1
FEVER: 1
HEMATURIA: 0
SORE THROAT: 0
DYSURIA: 0
CHILLS: 0

## 2022-12-04 ASSESSMENT — ACTIVITIES OF DAILY LIVING (ADL)
ADLS_ACUITY_SCORE: 35
ADLS_ACUITY_SCORE: 35

## 2022-12-04 NOTE — ED TRIAGE NOTES
Presents to triage with son for c/o breathing problem and cough that started 2 days ago.  Audible breaths.  RR 30.  Chest pain post dry  cough.  No fevers at home.  +Runny nose.        Triage Assessment     Row Name 12/04/22 4180       Triage Assessment (Adult)    Airway WDL WDL       Respiratory WDL    Respiratory WDL X;rhythm/pattern    Rhythm/Pattern, Respiratory dyspnea on exertion;shortness of breath;tachypneic       Skin Circulation/Temperature WDL    Skin Circulation/Temperature WDL WDL       Cardiac WDL    Cardiac WDL WDL       Peripheral/Neurovascular WDL    Peripheral Neurovascular WDL WDL       Cognitive/Neuro/Behavioral WDL    Cognitive/Neuro/Behavioral WDL WDL

## 2022-12-04 NOTE — LETTER
12/07/22      To Whom it may concern:    Colin Car was at the hospital from 12/4-12/07/22. Family was with him here as he needed their assistance.  Please excuse them from work/school.      Sincerely,        Danuta Andino MD

## 2022-12-05 ENCOUNTER — APPOINTMENT (OUTPATIENT)
Dept: CARDIOLOGY | Facility: HOSPITAL | Age: 87
DRG: 190 | End: 2022-12-05
Attending: FAMILY MEDICINE
Payer: COMMERCIAL

## 2022-12-05 LAB
ANION GAP SERPL CALCULATED.3IONS-SCNC: 12 MMOL/L (ref 7–15)
BUN SERPL-MCNC: 28.4 MG/DL (ref 8–23)
CALCIUM SERPL-MCNC: 8.3 MG/DL (ref 8.8–10.2)
CHLORIDE SERPL-SCNC: 106 MMOL/L (ref 98–107)
CREAT SERPL-MCNC: 1.22 MG/DL (ref 0.67–1.17)
DEPRECATED HCO3 PLAS-SCNC: 19 MMOL/L (ref 22–29)
ERYTHROCYTE [DISTWIDTH] IN BLOOD BY AUTOMATED COUNT: 13 % (ref 10–15)
GFR SERPL CREATININE-BSD FRML MDRD: 58 ML/MIN/1.73M2
GLUCOSE SERPL-MCNC: 156 MG/DL (ref 70–99)
HCT VFR BLD AUTO: 52.5 % (ref 40–53)
HGB BLD-MCNC: 17.6 G/DL (ref 13.3–17.7)
LACTATE SERPL-SCNC: 1.1 MMOL/L (ref 0.7–2)
LVEF ECHO: NORMAL
MCH RBC QN AUTO: 32.2 PG (ref 26.5–33)
MCHC RBC AUTO-ENTMCNC: 33.5 G/DL (ref 31.5–36.5)
MCV RBC AUTO: 96 FL (ref 78–100)
PLATELET # BLD AUTO: 121 10E3/UL (ref 150–450)
POTASSIUM SERPL-SCNC: 4 MMOL/L (ref 3.4–5.3)
PROCALCITONIN SERPL IA-MCNC: 0.3 NG/ML
RBC # BLD AUTO: 5.47 10E6/UL (ref 4.4–5.9)
SODIUM SERPL-SCNC: 137 MMOL/L (ref 136–145)
TROPONIN T SERPL HS-MCNC: 29 NG/L
WBC # BLD AUTO: 5.7 10E3/UL (ref 4–11)

## 2022-12-05 PROCEDURE — 36415 COLL VENOUS BLD VENIPUNCTURE: CPT | Performed by: FAMILY MEDICINE

## 2022-12-05 PROCEDURE — 999N000157 HC STATISTIC RCP TIME EA 10 MIN

## 2022-12-05 PROCEDURE — 83605 ASSAY OF LACTIC ACID: CPT | Performed by: EMERGENCY MEDICINE

## 2022-12-05 PROCEDURE — 36415 COLL VENOUS BLD VENIPUNCTURE: CPT | Performed by: EMERGENCY MEDICINE

## 2022-12-05 PROCEDURE — 250N000012 HC RX MED GY IP 250 OP 636 PS 637: Performed by: FAMILY MEDICINE

## 2022-12-05 PROCEDURE — 250N000013 HC RX MED GY IP 250 OP 250 PS 637: Performed by: FAMILY MEDICINE

## 2022-12-05 PROCEDURE — 99233 SBSQ HOSP IP/OBS HIGH 50: CPT | Performed by: FAMILY MEDICINE

## 2022-12-05 PROCEDURE — 94640 AIRWAY INHALATION TREATMENT: CPT

## 2022-12-05 PROCEDURE — 80048 BASIC METABOLIC PNL TOTAL CA: CPT | Performed by: FAMILY MEDICINE

## 2022-12-05 PROCEDURE — 250N000009 HC RX 250: Performed by: FAMILY MEDICINE

## 2022-12-05 PROCEDURE — 93306 TTE W/DOPPLER COMPLETE: CPT

## 2022-12-05 PROCEDURE — 94660 CPAP INITIATION&MGMT: CPT

## 2022-12-05 PROCEDURE — 250N000011 HC RX IP 250 OP 636: Performed by: FAMILY MEDICINE

## 2022-12-05 PROCEDURE — 93306 TTE W/DOPPLER COMPLETE: CPT | Mod: 26 | Performed by: INTERNAL MEDICINE

## 2022-12-05 PROCEDURE — 84484 ASSAY OF TROPONIN QUANT: CPT | Performed by: EMERGENCY MEDICINE

## 2022-12-05 PROCEDURE — 210N000001 HC R&B IMCU HEART CARE

## 2022-12-05 PROCEDURE — 84145 PROCALCITONIN (PCT): CPT | Performed by: FAMILY MEDICINE

## 2022-12-05 PROCEDURE — 258N000003 HC RX IP 258 OP 636: Performed by: FAMILY MEDICINE

## 2022-12-05 PROCEDURE — 85027 COMPLETE CBC AUTOMATED: CPT | Performed by: FAMILY MEDICINE

## 2022-12-05 PROCEDURE — 87040 BLOOD CULTURE FOR BACTERIA: CPT | Performed by: EMERGENCY MEDICINE

## 2022-12-05 PROCEDURE — 94640 AIRWAY INHALATION TREATMENT: CPT | Mod: 76

## 2022-12-05 RX ORDER — IPRATROPIUM BROMIDE AND ALBUTEROL SULFATE 2.5; .5 MG/3ML; MG/3ML
3 SOLUTION RESPIRATORY (INHALATION)
Status: DISCONTINUED | OUTPATIENT
Start: 2022-12-05 | End: 2022-12-05

## 2022-12-05 RX ORDER — LISINOPRIL 20 MG/1
20 TABLET ORAL DAILY
Status: DISCONTINUED | OUTPATIENT
Start: 2022-12-05 | End: 2022-12-07 | Stop reason: HOSPADM

## 2022-12-05 RX ORDER — ALBUTEROL SULFATE 0.83 MG/ML
2.5 SOLUTION RESPIRATORY (INHALATION)
Status: DISCONTINUED | OUTPATIENT
Start: 2022-12-05 | End: 2022-12-05

## 2022-12-05 RX ORDER — PREDNISONE 20 MG/1
40 TABLET ORAL DAILY
Status: DISCONTINUED | OUTPATIENT
Start: 2022-12-05 | End: 2022-12-07 | Stop reason: HOSPADM

## 2022-12-05 RX ORDER — HEPARIN SODIUM 5000 [USP'U]/.5ML
5000 INJECTION, SOLUTION INTRAVENOUS; SUBCUTANEOUS EVERY 12 HOURS
Status: DISCONTINUED | OUTPATIENT
Start: 2022-12-05 | End: 2022-12-07 | Stop reason: HOSPADM

## 2022-12-05 RX ORDER — ALBUTEROL SULFATE 0.83 MG/ML
2.5 SOLUTION RESPIRATORY (INHALATION) EVERY 6 HOURS PRN
Status: DISCONTINUED | OUTPATIENT
Start: 2022-12-05 | End: 2022-12-07 | Stop reason: HOSPADM

## 2022-12-05 RX ORDER — IPRATROPIUM BROMIDE AND ALBUTEROL SULFATE 2.5; .5 MG/3ML; MG/3ML
3 SOLUTION RESPIRATORY (INHALATION)
Status: DISCONTINUED | OUTPATIENT
Start: 2022-12-06 | End: 2022-12-07 | Stop reason: HOSPADM

## 2022-12-05 RX ADMIN — IPRATROPIUM BROMIDE AND ALBUTEROL SULFATE 3 ML: 2.5; .5 SOLUTION RESPIRATORY (INHALATION) at 19:29

## 2022-12-05 RX ADMIN — IPRATROPIUM BROMIDE AND ALBUTEROL SULFATE 3 ML: 2.5; .5 SOLUTION RESPIRATORY (INHALATION) at 07:39

## 2022-12-05 RX ADMIN — SODIUM CHLORIDE: 9 INJECTION, SOLUTION INTRAVENOUS at 00:25

## 2022-12-05 RX ADMIN — ATORVASTATIN CALCIUM 40 MG: 40 TABLET, FILM COATED ORAL at 21:45

## 2022-12-05 RX ADMIN — IPRATROPIUM BROMIDE AND ALBUTEROL SULFATE 3 ML: 2.5; .5 SOLUTION RESPIRATORY (INHALATION) at 12:05

## 2022-12-05 RX ADMIN — PREDNISONE 40 MG: 20 TABLET ORAL at 21:45

## 2022-12-05 RX ADMIN — LISINOPRIL 20 MG: 20 TABLET ORAL at 21:46

## 2022-12-05 RX ADMIN — IPRATROPIUM BROMIDE AND ALBUTEROL SULFATE 3 ML: 2.5; .5 SOLUTION RESPIRATORY (INHALATION) at 03:12

## 2022-12-05 RX ADMIN — Medication 1 MG: at 21:45

## 2022-12-05 RX ADMIN — HEPARIN SODIUM 5000 UNITS: 10000 INJECTION, SOLUTION INTRAVENOUS; SUBCUTANEOUS at 21:45

## 2022-12-05 ASSESSMENT — ACTIVITIES OF DAILY LIVING (ADL)
ADLS_ACUITY_SCORE: 35
DEPENDENT_IADLS:: CLEANING;COOKING;LAUNDRY;SHOPPING;MEAL PREPARATION;MEDICATION MANAGEMENT;TRANSPORTATION
ADLS_ACUITY_SCORE: 35

## 2022-12-05 NOTE — PROGRESS NOTES
Continues on BiPAP, see flow sheet, BS diminished bilat, SpO2 96 %. Duoneb tx given inline, tolerated well, BS mild E/wheezes YESY after.    BP (!) 142/71   Pulse 72   Temp 98.2  F (36.8  C) (Oral)   Resp 24   SpO2 98%     RT to monitor

## 2022-12-05 NOTE — ED PROVIDER NOTES
Emergency Department Encounter     Evaluation Date & Time:   12/4/2022  7:35 PM    CHIEF COMPLAINT:  Shortness of Breath and Cough      Triage Note:  Presents to triage with son for c/o breathing problem and cough that started 2 days ago.  Audible breaths.  RR 30.  Chest pain post dry  cough.  No fevers at home.  +Runny nose.                ED COURSE & MEDICAL DECISION MAKING:     ED Course as of 12/04/22 2321   Sun Dec 04, 2022   1959 Pt with no real improvement after neb.  RT being called to place pt on Bipap.  Pt is not hypoxic, but clearly in respiratory distress with tachypnea and increased work of breathing.     2023 RSV+, consistent with viral URI symptoms, which now are affecting his breathing.     2104 Pt doing much better on Bipap with improved respiratory rate and work of breathing.   2115 Lactate just at 2.1. Awaiting rest of labs.  Will hydrate with IVF.  Given RSV + and normal WBC, will not start broad spectrum antibx at this time.   2130 Pt appears much more comfortable on re-evaluation.  FiO2 at 30%, appropriate for tele floor.   2130 BNP wnl.  Trop 34. Will get delta trop, although I suspect any slight elevation is demand related to infection.   2153 1L IVF ordered for slightly elevated lactate and mild dehydration on BMP.     Pt here with worsening breathing difficulty today with audible wheezes and no prior chf/copd/asthma history.  Son here providing additional information and history.  Pt with cough for previous 2-3 days with some diarrhea and chest pain only with coughing.  Reports subjective fevers.  No vomiting, leg pain/swelling or prior dvt/pe history.  Initial swabs sent for flu/covid and not yet back.  CXR clear.  Pt with worsening breathing while awaiting in [facility] due to extreme bed crisis and no ED bed availability. Fortunately, RN in [facility] noted changes and pt brought back to room for additional workup/treatment.      7:44 PM I met with patient for initial interview and encounter.  PPE worn includes N95 mask.  9:16 PM I updated patient with results and plan for admission.  9:51 PM I spoke to hospitalist Dr. Hinkle regarding plan for admission.    At the conclusion of the encounter I discussed the results of all the tests and the disposition. The questions were answered. The patient or family acknowledged understanding and was agreeable with the care plan.      MEDICATIONS GIVEN IN THE EMERGENCY DEPARTMENT:  Medications   lidocaine 1 % 0.1-1 mL (has no administration in time range)   lidocaine (LMX4) cream (has no administration in time range)   sodium chloride (PF) 0.9% PF flush 3 mL (3 mLs Intracatheter Not Given 12/4/22 2226)   sodium chloride (PF) 0.9% PF flush 3 mL (has no administration in time range)   melatonin tablet 1 mg (has no administration in time range)   ipratropium - albuterol 0.5 mg/2.5 mg/3 mL (DUONEB) neb solution 3 mL (3 mLs Nebulization Given 12/4/22 2254)   No lozenges or gum should be given while patient on BIPAP/AVAPS/AVAPS AE (has no administration in time range)   carboxymethylcellulose PF (REFRESH PLUS) 0.5 % ophthalmic solution 1 drop (has no administration in time range)   Patient may continue current oral medications (has no administration in time range)   hydrALAZINE (APRESOLINE) injection 10 mg (has no administration in time range)   atorvastatin (LIPITOR) tablet 40 mg (40 mg Oral Not Given 12/4/22 2300)   sodium chloride 0.9% infusion (has no administration in time range)   ipratropium - albuterol 0.5 mg/2.5 mg/3 mL (DUONEB) neb solution 3 mL (3 mLs Nebulization Given 12/4/22 1940)   methylPREDNISolone sodium succinate (solu-MEDROL) injection 125 mg (125 mg Intravenous Given 12/4/22 1956)   0.9% sodium chloride BOLUS (1,000 mLs Intravenous New Bag 12/4/22 2216)       NEW PRESCRIPTIONS STARTED AT TODAY'S ED VISIT:  New Prescriptions    No medications on file       HPI   HPI     Colin Car is a 87 year old male with a pertinent history of CKD stage 3, HLD,  HTN, who presents to this ED via walk-in for evaluation of shortness of breath, cough.    History partially provided by patient's son.    Patient reports he has had a cough for the past 2 days, and today had onset of worsening shortness of breath, subjective fever, and loose stools. He states that he has chest pain only secondary to coughing. Patient has not tried any treatments for his symptoms. Patient and patient's son deny any lung or cardiac history. Patient denies any DVT/PE history or any sick contacts. Denies any vomiting, abdominal pain, dark or bloody stool, leg pain or swelling, or any other complaints.      REVIEW OF SYSTEMS:  Review of Systems   Constitutional: Positive for fever (subjective). Negative for chills.   HENT: Negative for sore throat.    Eyes: Negative for visual disturbance.   Respiratory: Positive for cough (causes chest pain) and shortness of breath.    Cardiovascular: Negative for chest pain and leg swelling.   Gastrointestinal: Positive for diarrhea. Negative for abdominal pain, blood in stool, nausea and vomiting.   Endocrine: Negative for polyuria.   Genitourinary: Negative for dysuria and hematuria.        - urinary changes     Musculoskeletal: Negative for joint swelling.   Skin: Negative for rash.   Neurological: Negative for dizziness.   Psychiatric/Behavioral: Negative for confusion.   All other systems reviewed and are negative.        Medical History     Past Medical History:   Diagnosis Date     Hyperlipidemia      Hypertension      Hypothyroidism      Major depressive disorder, recurrent episode, mild (H)        Past Surgical History:   Procedure Laterality Date     ENDOSCOPIC RETROGRADE CHOLANGIOPANCREATOGRAM N/A 6/2/2018    Procedure: ENDOSCOPIC RETROGRADE CHOLANGIOPANCREATOGRAPHY; SPHINCTEROTOMY AND STONE EXTRACTION;  Surgeon: Juve Shannon MD;  Location: [facility] - [place], [place];  Service:      LAPAROSCOPIC CHOLECYSTECTOMY N/A 6/1/2018    Procedure: CHOLECYSTECTOMY,  LAPAROSCOPIC, COMMON DUCT EXPLORATION;  Surgeon: Meir Beck MD;  Location: [facility] OR;  Service:      NO PAST SURGERIES         Family History   Problem Relation Age of Onset     No Known Problems Mother      No Known Problems Father      No Known Problems Brother      No Known Problems Brother        Social History     Tobacco Use     Smoking status: Never     Smokeless tobacco: Never   Vaping Use     Vaping Use: Never used   Substance Use Topics     Alcohol use: No     Drug use: No       acetaminophen (TYLENOL EXTRA STRENGTH) 500 MG tablet  atorvastatin (LIPITOR) 40 MG tablet  calcium carbonate-vitamin D3 (CALTRATE 600 PLUS D3) 600 mg(1,500mg) -400 unit per tablet  lisinopril-hydrochlorothiazide (ZESTORETIC) 20-25 MG tablet        Physical Exam     Vitals:  BP (!) 158/83   Pulse 93   Temp 98.2  F (36.8  C) (Oral)   Resp 25   SpO2 95%     PHYSICAL EXAM:   Physical Exam  Vitals and nursing note reviewed.   Constitutional:       General: He is not in acute distress.     Appearance: Normal appearance.      Comments: Able to speak in full sentences.    HENT:      Head: Normocephalic and atraumatic.      Nose: Nose normal.      Mouth/Throat:      Mouth: Mucous membranes are moist.   Eyes:      Pupils: Pupils are equal, round, and reactive to light.   Neck:      Vascular: No JVD.   Cardiovascular:      Rate and Rhythm: Normal rate and regular rhythm.      Pulses: Normal pulses.           Radial pulses are 2+ on the right side and 2+ on the left side.        Dorsalis pedis pulses are 2+ on the right side and 2+ on the left side.   Pulmonary:      Effort: Tachypnea and respiratory distress present.      Breath sounds: Wheezing present.      Comments: Increased work of breathing. Harsh upper airway sounds.   Abdominal:      Palpations: Abdomen is soft.      Tenderness: There is no abdominal tenderness.   Musculoskeletal:      Cervical back: Full passive range of motion without pain and neck supple.       Comments: No calf tenderness or swelling b/l   Skin:     General: Skin is warm.      Findings: No rash.   Neurological:      General: No focal deficit present.      Mental Status: He is alert. Mental status is at baseline.      Comments: Fluent speech, no acute lateralizing deficits   Psychiatric:         Mood and Affect: Mood normal.         Behavior: Behavior normal.         Results     LAB:  All pertinent labs reviewed and interpreted  Labs Ordered and Resulted from Time of ED Arrival to Time of ED Departure   INFLUENZA A/B & SARS-COV2 PCR MULTIPLEX - Abnormal       Result Value    Influenza A PCR Negative      Influenza B PCR Negative      RSV PCR Positive (*)     SARS CoV2 PCR Negative     BASIC METABOLIC PANEL - Abnormal    Sodium 137      Potassium 3.7      Chloride 99      Carbon Dioxide (CO2) 25      Anion Gap 13      Urea Nitrogen 32.5 (*)     Creatinine 1.60 (*)     Calcium 9.3      Glucose 119 (*)     GFR Estimate 42 (*)    TROPONIN T, HIGH SENSITIVITY - Abnormal    Troponin T, High Sensitivity 34 (*)    BLOOD GAS VENOUS - Abnormal    pH Venous 7.28 (*)     pCO2 Venous 59 (*)     pO2 Venous 27      Bicarbonate Venous 28      Base Excess/Deficit (+/-) 1.4      Oxyhemoglobin Venous 46.1 (*)     O2 Sat, Venous 46.9 (*)    CBC WITH PLATELETS AND DIFFERENTIAL - Abnormal    WBC Count 9.7      RBC Count 5.84      Hemoglobin 18.7 (*)     Hematocrit 56.3 (*)     MCV 96      MCH 32.0      MCHC 33.2      RDW 13.1      Platelet Count 141 (*)     % Neutrophils 52      % Lymphocytes 36      % Monocytes 10      % Eosinophils 1      % Basophils 1      % Immature Granulocytes 0      NRBCs per 100 WBC 0      Absolute Neutrophils 5.0      Absolute Lymphocytes 3.5      Absolute Monocytes 1.0      Absolute Eosinophils 0.1      Absolute Basophils 0.1      Absolute Immature Granulocytes 0.0      Absolute NRBCs 0.0     LACTIC ACID WHOLE BLOOD - Abnormal    Lactic Acid 2.1 (*)    BLOOD GAS ARTERIAL - Abnormal    pH Arterial 7.33  (*)     pCO2 Arterial 42      pO2 Arterial 94 (*)     Bicarbonate Arterial 22 (*)     O2 Sat, Arterial 97.3 (*)     Oxyhemoglobin 96.1 (*)     Base Excess/Deficit (+/-) -4.0      Sample Stabilized Temperature 37.0     MAGNESIUM - Normal    Magnesium 2.1     NT PROBNP INPATIENT - Normal    N terminal Pro BNP Inpatient 361     HEPATIC FUNCTION PANEL - Normal    Protein Total 8.3      Albumin 4.6      Bilirubin Total 1.0      Alkaline Phosphatase 70      AST 39      ALT 23      Bilirubin Direct 0.23     LACTIC ACID WHOLE BLOOD   TROPONIN T, HIGH SENSITIVITY   BLOOD CULTURE   BLOOD CULTURE       RADIOLOGY:  XR Chest 2 Views   Final Result   IMPRESSION: Cardiomegaly. Stable appearance of the mediastinum. No acute infiltrate or significant change.                   ECG:  NSR, rate 95, occasional PACs, no acute ischemia    I have independently reviewed and interpreted the EKG(s) documented above     PROCEDURES:  Procedures:  none      FINAL IMPRESSION:    ICD-10-CM    1. Acute bronchitis due to respiratory syncytial virus (RSV)  J20.5       2. Acute respiratory failure with hypercapnia (H)  J96.02           CRITICAL CARE  45 minutes of critical care time spent managing hypercapneic respiratory failure with RSV bronchitis. Time spent interpreting labs, imaging, speaking with pt/family, documenting. Independent of any procedures performed.      I, Ramirez Lugo, am serving as a scribe to document services personally performed by Dr. Amos Melton, based on my observations and the provider's statements to me. I, Amos Melton, DO attest that Ramirez Lugo is acting in a scribe capacity, has observed my performance of the services and has documented them in accordance with my direction.      Amos Melton DO  Emergency Medicine  [facility] EMERGENCY DEPARTMENT  12/4/2022  7:43 PM        Amos Melton MD  12/04/22 3741

## 2022-12-05 NOTE — ED NOTES
ER NURSING BOARDING NOTE  Colin Car MRN: [id number]      Admitting dx:   (J20.5) Acute bronchitis due to respiratory syncytial virus (RSV)    (J96.02) Acute respiratory failure with hypercapnia (H)    Current length of ER stay: 15.5 hours    Back story: ER visit on 12/4/22 for c/o 2 day old cough and SOB.  Now on Bipap at 30% FiO2, 12/6, rate 14.      Medical Hx:   Past Medical History:   Diagnosis Date     Hyperlipidemia      Hypertension      Hypothyroidism      Major depressive disorder, recurrent episode, mild (H)     Created by Conversion         Current status:    Neuro: alert and oriented  Cardiac: on full monitor, HR 70s, NSR.  Respiratory: BiPap: FiO2 30%, rate 14, 12/6.  COVID swab: +RSV, negative covid  Digestive: AP/N/V/D  Urinary: using urinal  Mobility: steady on feet  Skin: no skin impairments  IV Access site: 20g left wrist.  IVF d/c'd  Psych: calm/cooperative  Current living status: lives with son  Visitor at bedside: son  Preferred language: Hmong  VS obtained: BP (!) 157/94   Pulse 79   Temp 97.1  F (36.2  C) (Axillary)   Resp 30   SpO2 98% .  Plan: Admitting to: CARDs  Consults: None ordered.

## 2022-12-05 NOTE — PROGRESS NOTES
[facility]    Medicine Progress Note - Hospitalist Service    Date of Admission:  12/4/2022    Assessment & Plan      Colin Car is a 87 year old male with PMH significant for HTN who presented to ED due to cough and SOB and has been admitted on 12/4/2022 due to respiratory distress and RSV.     1. Acute Hypoxic Hypercapneic Respiratory Failure-    ---suspect from RSV  ---RSV positive. No PNA on CXR. Normal bnp and unremarkable serial troponin, no hx COPD  ---Continue BiPAP and wean to off today.   ---Stop IVF  ---Scheduled duonebs.  --- Telemetry monitoring.   --- Due to degree of respiratory difficulty without underlying lung disease we will check echo  ---pulmonology consult if he goes back on bypap  ---check procalcitonin  ---with wheezing start steroids short 3-day course  ---no clear indication for abx, bc pending  --- Change nebs to every 6 hours    3. JOSÉ MIGUEL-   ---resolved, baseline around 1.16, Likely prerenal.   ---did Give IV, now stop  --- resume home dose of Lisinopril. Repeat renal function in a.m.    4. HTN-   --did Hold home dose of Lisinopril due to JOSÉ MIGUEL.  --- PRN IV Hydralazine. Monitor vitals closely.    Hyperlipidemia--- Home statin    Subclinical hypothyroidism--not on meds for this       Diet: NPO for Medical/Clinical Reasons Except for: Meds    DVT Prophylaxis: Heparin SQ  Logan Catheter: Not present  Central Lines: None  Cardiac Monitoring: None  Code Status: Full Code      Disposition Plan      Expected Discharge Date: 12/06/2022                The patient's care was discussed with the Bedside Nurse, Patient, Patient's Family and RT.    Danuta Andino MD  Hospitalist Service  [facility]  Securely message with the Vocera Web Console (learn more here)  Text page via [employer] Paging/Directory         Clinically Significant Risk Factors Present on Admission          # Hypocalcemia: Lowest Ca = 8.3 mg/dL (Ref range: 8.5 - 10.1 mg/dL) in last 2 days,  will monitor and replace as appropriate       # Thrombocytopenia: Lowest platelets = 121 (Ref range: 150-450) in last 2 days, will monitor for bleeding   # Hypertension: home medication list includes antihypertensive(s)   # Non-Invasive mechanical ventilation: current O2 Device: BiPAP/CPAP  # Acute hypoxic respiratory failure: continue supplemental O2 as needed            ______________________________________________________________________    Interval History   ---Patient seen in presence of the [facility]   ---Breathing feels better and he misses home  --he would like to sleep on a clear hardwood floor instead of the bed  ---denies previous lung disease or smoking  ---no vomiting, abdominal pain or diarrhea    Data reviewed today: I reviewed all medications, new labs and imaging results over the last 24 hours.     Physical Exam   Vital Signs: Temp: 97.1  F (36.2  C) Temp src: Axillary BP: (!) 157/94 Pulse: 79   Resp: 25 SpO2: 97 % O2 Device: BiPAP/CPAP    Weight: 0 lbs 0 oz  General Appearance: Awake and talking with bypap on  Respiratory: midl wheezing bilaterally and more upper airway rohnchi  Cardiovascular: RRR, no murmers  GI: obese,, soft and nontender  Skin: no significant LE edema  Other: Neuro grossly intact without focal deficits appreciated     Data   Recent Labs   Lab 12/05/22  0732 12/04/22 2027   WBC 5.7 9.7   HGB 17.6 18.7*   MCV 96 96   * 141*    137   POTASSIUM 4.0 3.7   CHLORIDE 106 99   CO2 19* 25   BUN 28.4* 32.5*   CR 1.22* 1.60*   ANIONGAP 12 13   GEENA 8.3* 9.3   * 119*   ALBUMIN  --  4.6   PROTTOTAL  --  8.3   BILITOTAL  --  1.0   ALKPHOS  --  70   ALT  --  23   AST  --  39     Recent Results (from the past 24 hour(s))   XR Chest 2 Views    Narrative    EXAM: XR CHEST 2 VIEWS  LOCATION: [facility]  DATE/TIME: 12/4/2022 6:35 PM    INDICATION: sob, cough  COMPARISON: 06/10/2022      Impression    IMPRESSION: Cardiomegaly. Stable appearance of  the mediastinum. No acute infiltrate or significant change.

## 2022-12-05 NOTE — ED NOTES
Pt doing well on 3L of oxygen via NC.  Has audible breathing and sits in a near tripod position, this is baseline per family member.

## 2022-12-05 NOTE — CONSULTS
Care Management Initial Consult    General Information  Assessment completed with: Patient, Children, pt and dtr Prudencio  Type of CM/SW Visit: Initial Assessment    Primary Care Provider verified and updated as needed: Yes   Readmission within the last 30 days: no previous admission in last 30 days   Return Category: Progression of disease  Reason for Consult: discharge planning  Advance Care Planning: Advance Care Planning Reviewed: verified with patient          Communication Assessment  Patient's communication style: spoken language (English or Bilingual)             Cognitive  Cognitive/Neuro/Behavioral: WDL                      Living Environment:   People in home: child(thuy), adult     Current living Arrangements: house      Able to return to prior arrangements: yes       Family/Social Support:  Care provided by: self, other (see comments), child(thuy)  Provides care for: no one  Marital Status: Single  PCA, Children          Description of Support System: Supportive, Involved         Current Resources:   Patient receiving home care services: No     Community Resources: PCA  Equipment currently used at home: cane, straight  Supplies currently used at home: None    Employment/Financial:  Employment Status: disabled        Financial Concerns: No concerns identified   Referral to Financial Worker: No       Lifestyle & Psychosocial Needs:  Social Determinants of Health     Tobacco Use: Low Risk      Smoking Tobacco Use: Never     Smokeless Tobacco Use: Never     Passive Exposure: Not on file   Alcohol Use: Not on file   Financial Resource Strain: Not on file   Food Insecurity: Not on file   Transportation Needs: Not on file   Physical Activity: Not on file   Stress: Not on file   Social Connections: Not on file   Intimate Partner Violence: Not on file   Depression: Not at risk     PHQ-2 Score: 0   Housing Stability: Not on file       Functional Status:  Prior to admission patient needed assistance:   Dependent ADLs::  Ambulation-cane, Bathing, Dressing, Grooming  Dependent IADLs:: Cleaning, Cooking, Laundry, Shopping, Meal Preparation, Medication Management, Transportation       Mental Health Status:  Mental Health Status: No Current Concerns       Chemical Dependency Status:                Values/Beliefs:  Spiritual, Cultural Beliefs, Orthodox Practices, Values that affect care:                 Additional Information:  Assessed, lives w/son Robbie, has PCA svcs, family transport and CM to follow for progression of care.      John Car RN

## 2022-12-05 NOTE — PROGRESS NOTES
Remains on BiPAP 12/6 14 .30 See flow sheet. BS diminished and clear bilat, Duoneb x given inline, tolerated well, BS mild E/wheezes on Rt after    BP (!) 157/94   Pulse 79   Temp 97.1  F (36.2  C) (Axillary)   Resp 30   SpO2 98%     RT to monitor.

## 2022-12-05 NOTE — H&P
[facility]    History and Physical - Hospitalist Service       Date of Admission:  12/4/2022    Assessment & Plan      Colin Car is a 87 year old male with PMH significant for HTN and HLP who presented to ED due to cough and SOB and has been admitted on 12/4/2022 due to respiratory distress and RSV.     1. Acute Hypercapneic Respiratory Failure- Admit patient. RSV positive. No PNA on CXR. Continue BiPAP. Scheduled duonebs. Telemetry monitoring. Supportive measures. Will make further recommendations based on clinical course.    2. Elevated Troponin-  Chest pain free. Possible demand ischemia. On BiPAP. Telemetry monitoring. Serial Troponin. Recommend Cardiology consultation if troponin increases.     3. JOSÉ MIGUEL- Likely prerenal. Give IVF. Hold home dose of Lisinopril. Repeat renal function in a.m.    4. HTN- Hold home dose of Lisinopril due to JOSÉ MIGUEL. PRN IV Hydralazine. Monitor vitals closely.     Diet: NPO for Medical/Clinical Reasons Except for: Meds    DVT Prophylaxis: Pneumatic Compression Devices  Logan Catheter: Not present  Central Lines: None  Cardiac Monitoring: ACTIVE order. Indication: On Bipap  Code Status: Full Code      Clinically Significant Risk Factors Present on Admission                  # Hypertension: home medication list includes antihypertensive(s)   # Non-Invasive mechanical ventilation: current O2 Device: BiPAP/CPAP  # Acute hypoxic respiratory failure: continue supplemental O2 as needed            Disposition Plan      Expected Discharge Date: 12/06/2022                The patient's care was discussed with the Patient and Patient's Family.    Lucinda Kohler MD  Hospitalist Service  [facility]  Securely message with the Vocera Web Console (learn more here)  Text page via [employer] Paging/Directory         ______________________________________________________________________    Chief Complaint   Sough, shortness of breath    History obtain from  patient. Son translated Hmong to English.    History of Present Illness   Colin Car is a 87 year old male with PMH significant for HTN and HLP who presented to ED due to cough and SOB. He speaks Hmong, but son is present at bedside and assists with translation.  He has had progressively worsening productive cough and shortness of breath over the past 2 days. He denies chest pain, palpitations, fever, chills, nausea, vomiting or diarrhea. CXR negative for PNA. Respiratory panel RSV+. He was placed on BiPAP in the ED. He is feeling some improvement and would like to come of BiPAP. Patient currently appears in no acute distress.    Review of Systems    The 10 point Review of Systems is negative other than noted in the HPI.    Past Medical History    I have reviewed this patient's medical history and updated it with pertinent information if needed.   Past Medical History:   Diagnosis Date     Hyperlipidemia      Hypertension      Hypothyroidism      Major depressive disorder, recurrent episode, mild (H)     Created by Conversion        Past Surgical History   I have reviewed this patient's surgical history and updated it with pertinent information if needed.  Past Surgical History:   Procedure Laterality Date     ENDOSCOPIC RETROGRADE CHOLANGIOPANCREATOGRAM N/A 6/2/2018    Procedure: ENDOSCOPIC RETROGRADE CHOLANGIOPANCREATOGRAPHY; SPHINCTEROTOMY AND STONE EXTRACTION;  Surgeon: Juve Shannon MD;  Location: [facility];  Service:      LAPAROSCOPIC CHOLECYSTECTOMY N/A 6/1/2018    Procedure: CHOLECYSTECTOMY, LAPAROSCOPIC, COMMON DUCT EXPLORATION;  Surgeon: Meir Beck MD;  Location: [facility];  Service:      NO PAST SURGERIES         Social History   I have reviewed this patient's social history and updated it with pertinent information if needed.  Social History     Tobacco Use     Smoking status: Never     Smokeless tobacco: Never   Vaping Use     Vaping Use: Never used   Substance Use  Topics     Alcohol use: No     Drug use: No       Family History   I have reviewed this patient's family history and updated it with pertinent information if needed.  Family History   Problem Relation Age of Onset     No Known Problems Mother      No Known Problems Father      No Known Problems Brother      No Known Problems Brother        Prior to Admission Medications   Prior to Admission Medications   Prescriptions Last Dose Informant Patient Reported? Taking?   acetaminophen (TYLENOL EXTRA STRENGTH) 500 MG tablet Unknown  No Yes   Sig: [ACETAMINOPHEN (TYLENOL EXTRA STRENGTH) 500 MG TABLET] Take 1 tablet (500 mg total) by mouth every 4 (four) hours as needed for pain.   atorvastatin (LIPITOR) 40 MG tablet Unknown  No Yes   Sig: TAKE 1 PILL BY MOUTH EVERY DAY/TXA HNUB NOJ 1 LUB [facility] NTSHAV MUAJ ROJ   calcium carbonate-vitamin D3 (CALTRATE 600 PLUS D3) 600 mg(1,500mg) -400 unit per tablet Unknown  No Yes   Sig: [CALCIUM CARBONATE-VITAMIN D3 (CALTRATE 600 PLUS D3) 600 MG(1,500MG) -400 UNIT PER TABLET] TAKE 1 PILL BY MOUTH EVERY DAY/TXA HNUB NOJ 1 LUB [facility] POBTXHA   lisinopril-hydrochlorothiazide (ZESTORETIC) 20-25 MG tablet 12/4/2022  No Yes   Sig: Take 1 tablet by mouth daily      Facility-Administered Medications: None     Allergies   No Known Allergies    Physical Exam   Vital Signs: Temp: 98.2  F (36.8  C) Temp src: Oral BP: (!) 158/78 Pulse: 99   Resp: 29 SpO2: 95 % O2 Device: BiPAP/CPAP    Weight: 0 lbs 0 oz    General Appearance: Awake, alert. On BiPAP  Eyes: EOMI  HEENT: On BiPAP  Respiratory: On BIPAP  Cardiovascular: Regular rate, s1s2  GI: +BS, soft, NT,ND  Genitourinary: deferred  Musculoskeletal: No c/c/e  Psychiatric: Normal affect    Data   Data reviewed today: I reviewed all medications, new labs and imaging results over the last 24 hours. I personally reviewed the chest x-ray image(s) showing Cardiomegaly. Stable appearance of the mediastinum. No acute infiltrate or significant  change..    EKG- I reviewed EKG. No ST depression or elevations.  Recent Labs   Lab 12/04/22 2027   WBC 9.7   HGB 18.7*   MCV 96   *      POTASSIUM 3.7   CHLORIDE 99   CO2 25   BUN 32.5*   CR 1.60*   ANIONGAP 13   GEENA 9.3   *   ALBUMIN 4.6   PROTTOTAL 8.3   BILITOTAL 1.0   ALKPHOS 70   ALT 23   AST 39

## 2022-12-05 NOTE — PROGRESS NOTES
Pt placed on BiPAP of 12/6 rate of 14 and 30% FiO2.  BS diminished, has exp wheezing, however, it is upper airway. tachypneic at about 30.  Sats 98%

## 2022-12-05 NOTE — PHARMACY-ADMISSION MEDICATION HISTORY
Pharmacy Note - Admission Medication History    Pertinent Provider Information: Family knows he takes a blood pressure medication. Fill history for Lipitor and lisinopril/hctz.  Removed asa 81 mg from list as family did not recall pt taking it.     ______________________________________________________________________    Prior To Admission (PTA) med list completed and updated in EMR.       PTA Med List   Medication Sig Last Dose     acetaminophen (TYLENOL EXTRA STRENGTH) 500 MG tablet [ACETAMINOPHEN (TYLENOL EXTRA STRENGTH) 500 MG TABLET] Take 1 tablet (500 mg total) by mouth every 4 (four) hours as needed for pain. Unknown     atorvastatin (LIPITOR) 40 MG tablet TAKE 1 PILL BY MOUTH EVERY DAY/VINICIOA HNUB NOJ 1 LUB [facility] NTSHAV MUAJ ROJ Unknown     calcium carbonate-vitamin D3 (CALTRATE 600 PLUS D3) 600 mg(1,500mg) -400 unit per tablet [CALCIUM CARBONATE-VITAMIN D3 (CALTRATE 600 PLUS D3) 600 MG(1,500MG) -400 UNIT PER TABLET] TAKE 1 PILL BY MOUTH EVERY DAY/TXA HNUB NOJ 1 LUB [facility] POBTXHA Unknown     lisinopril-hydrochlorothiazide (ZESTORETIC) 20-25 MG tablet Take 1 tablet by mouth daily 12/4/2022       Information source(s): Family member and CareEverywhere/SureScripts  Method of interview communication: in-person    Summary of Changes to PTA Med List  New: none  Discontinued: asa  Changed: none    Patient was asked about OTC/herbal products specifically.  PTA med list reflects this.    Patient does not use any multi-dose medications prior to admission.    The information provided in this note is only as accurate as the sources available at the time of the update(s).    Thank you for the opportunity to participate in the care of this patient.    Christy Diaz [facility]  12/4/2022 9:16 PM

## 2022-12-05 NOTE — PROGRESS NOTES
RCAT Treatment Plan    Patient Score:6  Patient Acuity: 4    Clinical Indication for Therapy:  RSV  Therapy Ordered: Q4 nebs    Assessment Summary: pt remains on BiPAP, has insp/exp wheezing on left side, however, primary wheezing is coming from upper airway.  RCAT score is low, will do one more Q4 Tx and if no changes will change frequency.  No current change in BS with neb.  Spoke with son/pt states no Hx of smoking, COPD, Asthma    Jsason Barbosa, RT  12/4/2022

## 2022-12-05 NOTE — PROGRESS NOTES
Pt remains on BiPAP, appears more comfortable.  Sats 96%.  Continues to have upper airway wheezing, no change with nebs

## 2022-12-06 ENCOUNTER — APPOINTMENT (OUTPATIENT)
Dept: CT IMAGING | Facility: HOSPITAL | Age: 87
DRG: 190 | End: 2022-12-06
Attending: FAMILY MEDICINE
Payer: COMMERCIAL

## 2022-12-06 LAB
ANION GAP SERPL CALCULATED.3IONS-SCNC: 12 MMOL/L (ref 7–15)
BUN SERPL-MCNC: 32 MG/DL (ref 8–23)
CALCIUM SERPL-MCNC: 8.5 MG/DL (ref 8.8–10.2)
CHLORIDE SERPL-SCNC: 106 MMOL/L (ref 98–107)
CREAT SERPL-MCNC: 1.09 MG/DL (ref 0.67–1.17)
D DIMER PPP FEU-MCNC: 2.66 UG/ML FEU (ref 0–0.5)
DEPRECATED HCO3 PLAS-SCNC: 23 MMOL/L (ref 22–29)
ERYTHROCYTE [DISTWIDTH] IN BLOOD BY AUTOMATED COUNT: 13.2 % (ref 10–15)
GFR SERPL CREATININE-BSD FRML MDRD: 67 ML/MIN/1.73M2
GLUCOSE SERPL-MCNC: 137 MG/DL (ref 70–99)
HCT VFR BLD AUTO: 52.6 % (ref 40–53)
HGB BLD-MCNC: 17.4 G/DL (ref 13.3–17.7)
MCH RBC QN AUTO: 32 PG (ref 26.5–33)
MCHC RBC AUTO-ENTMCNC: 33.1 G/DL (ref 31.5–36.5)
MCV RBC AUTO: 97 FL (ref 78–100)
PLATELET # BLD AUTO: 129 10E3/UL (ref 150–450)
POTASSIUM SERPL-SCNC: 4.2 MMOL/L (ref 3.4–5.3)
RBC # BLD AUTO: 5.43 10E6/UL (ref 4.4–5.9)
SODIUM SERPL-SCNC: 141 MMOL/L (ref 136–145)
WBC # BLD AUTO: 8.9 10E3/UL (ref 4–11)

## 2022-12-06 PROCEDURE — 94640 AIRWAY INHALATION TREATMENT: CPT

## 2022-12-06 PROCEDURE — 250N000013 HC RX MED GY IP 250 OP 250 PS 637: Performed by: FAMILY MEDICINE

## 2022-12-06 PROCEDURE — 250N000011 HC RX IP 250 OP 636: Performed by: FAMILY MEDICINE

## 2022-12-06 PROCEDURE — 85027 COMPLETE CBC AUTOMATED: CPT | Performed by: FAMILY MEDICINE

## 2022-12-06 PROCEDURE — 120N000004 HC R&B MS OVERFLOW

## 2022-12-06 PROCEDURE — 250N000009 HC RX 250: Performed by: STUDENT IN AN ORGANIZED HEALTH CARE EDUCATION/TRAINING PROGRAM

## 2022-12-06 PROCEDURE — 36415 COLL VENOUS BLD VENIPUNCTURE: CPT | Performed by: FAMILY MEDICINE

## 2022-12-06 PROCEDURE — 99233 SBSQ HOSP IP/OBS HIGH 50: CPT | Performed by: FAMILY MEDICINE

## 2022-12-06 PROCEDURE — 94640 AIRWAY INHALATION TREATMENT: CPT | Mod: 76

## 2022-12-06 PROCEDURE — 94660 CPAP INITIATION&MGMT: CPT

## 2022-12-06 PROCEDURE — 85379 FIBRIN DEGRADATION QUANT: CPT | Performed by: FAMILY MEDICINE

## 2022-12-06 PROCEDURE — 99223 1ST HOSP IP/OBS HIGH 75: CPT | Performed by: INTERNAL MEDICINE

## 2022-12-06 PROCEDURE — 250N000012 HC RX MED GY IP 250 OP 636 PS 637: Performed by: FAMILY MEDICINE

## 2022-12-06 PROCEDURE — 80048 BASIC METABOLIC PNL TOTAL CA: CPT | Performed by: FAMILY MEDICINE

## 2022-12-06 PROCEDURE — 71275 CT ANGIOGRAPHY CHEST: CPT

## 2022-12-06 PROCEDURE — 999N000157 HC STATISTIC RCP TIME EA 10 MIN

## 2022-12-06 RX ORDER — IOPAMIDOL 755 MG/ML
100 INJECTION, SOLUTION INTRAVASCULAR ONCE
Status: COMPLETED | OUTPATIENT
Start: 2022-12-06 | End: 2022-12-06

## 2022-12-06 RX ORDER — FUROSEMIDE 10 MG/ML
20 INJECTION INTRAMUSCULAR; INTRAVENOUS ONCE
Status: COMPLETED | OUTPATIENT
Start: 2022-12-06 | End: 2022-12-06

## 2022-12-06 RX ORDER — DOXYCYCLINE 100 MG/1
100 CAPSULE ORAL EVERY 12 HOURS SCHEDULED
Status: DISCONTINUED | OUTPATIENT
Start: 2022-12-06 | End: 2022-12-07 | Stop reason: HOSPADM

## 2022-12-06 RX ADMIN — LISINOPRIL 20 MG: 20 TABLET ORAL at 09:15

## 2022-12-06 RX ADMIN — IPRATROPIUM BROMIDE AND ALBUTEROL SULFATE 3 ML: 2.5; .5 SOLUTION RESPIRATORY (INHALATION) at 15:22

## 2022-12-06 RX ADMIN — FUROSEMIDE 20 MG: 10 INJECTION, SOLUTION INTRAMUSCULAR; INTRAVENOUS at 09:11

## 2022-12-06 RX ADMIN — PREDNISONE 40 MG: 20 TABLET ORAL at 09:15

## 2022-12-06 RX ADMIN — IOPAMIDOL 100 ML: 755 INJECTION, SOLUTION INTRAVENOUS at 11:51

## 2022-12-06 RX ADMIN — IPRATROPIUM BROMIDE AND ALBUTEROL SULFATE 3 ML: 2.5; .5 SOLUTION RESPIRATORY (INHALATION) at 08:55

## 2022-12-06 RX ADMIN — HEPARIN SODIUM 5000 UNITS: 10000 INJECTION, SOLUTION INTRAVENOUS; SUBCUTANEOUS at 17:24

## 2022-12-06 RX ADMIN — ATORVASTATIN CALCIUM 40 MG: 40 TABLET, FILM COATED ORAL at 20:50

## 2022-12-06 RX ADMIN — IPRATROPIUM BROMIDE AND ALBUTEROL SULFATE 3 ML: 2.5; .5 SOLUTION RESPIRATORY (INHALATION) at 20:15

## 2022-12-06 RX ADMIN — IPRATROPIUM BROMIDE AND ALBUTEROL SULFATE 3 ML: 2.5; .5 SOLUTION RESPIRATORY (INHALATION) at 12:02

## 2022-12-06 RX ADMIN — HEPARIN SODIUM 5000 UNITS: 10000 INJECTION, SOLUTION INTRAVENOUS; SUBCUTANEOUS at 06:08

## 2022-12-06 RX ADMIN — DOXYCYCLINE MONOHYDRATE 100 MG: 100 CAPSULE ORAL at 09:53

## 2022-12-06 RX ADMIN — DOXYCYCLINE MONOHYDRATE 100 MG: 100 CAPSULE ORAL at 20:50

## 2022-12-06 ASSESSMENT — ACTIVITIES OF DAILY LIVING (ADL)
WEAR_GLASSES_OR_BLIND: NO
ADLS_ACUITY_SCORE: 37
ADLS_ACUITY_SCORE: 35
DRESSING/BATHING_DIFFICULTY: NO
ADLS_ACUITY_SCORE: 37
ADLS_ACUITY_SCORE: 37
ADLS_ACUITY_SCORE: 36
ADLS_ACUITY_SCORE: 36
FALL_HISTORY_WITHIN_LAST_SIX_MONTHS: NO
WALKING_OR_CLIMBING_STAIRS_DIFFICULTY: NO
ADLS_ACUITY_SCORE: 22
TOILETING_ISSUES: NO
ADLS_ACUITY_SCORE: 37
DIFFICULTY_EATING/SWALLOWING: NO
EQUIPMENT_CURRENTLY_USED_AT_HOME: CANE, STRAIGHT
ADLS_ACUITY_SCORE: 37
CHANGE_IN_FUNCTIONAL_STATUS_SINCE_ONSET_OF_CURRENT_ILLNESS/INJURY: NO
DOING_ERRANDS_INDEPENDENTLY_DIFFICULTY: NO
ADLS_ACUITY_SCORE: 37
CONCENTRATING,_REMEMBERING_OR_MAKING_DECISIONS_DIFFICULTY: NO

## 2022-12-06 NOTE — PROGRESS NOTES
Called by RN to place pt back on BiPAP.  He was placed on earlier in the evening around 1930, unsure when he was taken off.  Pt was tachypneic at 32 upon entering room.  BS are diminished, however, still has noticeable wheezing coming from upper airway, very noticeable by auscultation on the right side of his neck.  Currently on BiPAP his RR is 22-24.

## 2022-12-06 NOTE — PROGRESS NOTES
Tolerating off BiPap, sats > 90% on room air, able to sit/stand at edge of bed without difficulty.

## 2022-12-06 NOTE — PROGRESS NOTES
[facility]    Medicine Progress Note - Hospitalist Service    Date of Admission:  12/4/2022    Assessment & Plan      Colin Car is a 87 year old male with PMH significant for HTN who presented to ED due to cough and SOB and has been admitted on 12/4/2022 due to respiratory distress and RSV.     1. Acute Hypoxic Hypercapneic Respiratory Failure-    ---suspect from RSV  ---unable to stay off bypap - will consult pulm  ---d-dimer up, CTA shows increased airway thickening, no change in peribronchial thickening and focal proximal left upper lobe and middle lobe airway severe narrowing; mild bronchiectasis with right lower lobe nodules  ---procal up - start doxycycline   ---RSV positive.   --- Normal bnp and unremarkable serial troponin  --- no hx COPD, does have underlying stridor versus wheezing at home per family.  ---echo with EF h65% some LVH but no RWMA  ---give dose lasix today  ---Scheduled duonebs.  --- Stop telemetry.   ---with wheezing start steroids short 5-day course    3. JOSÉ MIGUEL-   ---resolved, baseline around 1.16, Likely prerenal.   --- Was on IV fluids  --- resumed home dose of Lisinopril.     4. HTN-   --- Blood pressures trending up.  Did resume home lisinopril  --Consider resuming home hydrochlorothiazide if BMP stable tomorrow.  --- PRN IV Hydralazine. Monitor vitals closely.    Hyperlipidemia--- Home statin    Subclinical hypothyroidism--not on meds for this       Diet: Regular Diet Adult    DVT Prophylaxis: Heparin SQ  Logan Catheter: Not present  Central Lines: None  Cardiac Monitoring: None  Code Status: Full Code      Disposition Plan      Expected Discharge Date: 12/07/2022        Discharge Comments: pulm consult and stay off bypap        The patient's care was discussed with the Bedside Nurse, Patient, Patient's Family and RT.    Danuta Andino MD  Hospitalist Service  [facility]  Securely message with the Vocera Web Console (learn more  "here)  Text page via [facility] Paging/Directory         Clinically Significant Risk Factors          # Hypocalcemia: Lowest Ca = 8.3 mg/dL (Ref range: 8.5 - 10.1 mg/dL) in last 2 days, will monitor and replace as appropriate       # Thrombocytopenia: Lowest platelets = 121 (Ref range: 150-450) in last 2 days, will monitor for bleeding                 ______________________________________________________________________    Interval History   ---Patient againseen in presence of the LOAG  and his grandson  --- Needed to be placed on BiPAP overnight a few times.  Was just taken off when I come to see him and he was adamant earlier that he wanted to go home.  He wanted to try his own treatments at home and come back if he needed to.  Later in the afternoon he called the nurse and due to fatigue from breathing and requested BiPAP to be placed back on.  --- Still states breathing better than upon admission  ---denies previous lung disease or smoking, but grandson says that he has \"noisy breathing\" for several years.  He has never been on an inhaler or had it worked up per him    Data reviewed today: I reviewed all medications, new labs and imaging results over the last 24 hours.     Physical Exam   Vital Signs: Temp: 98.3  F (36.8  C) Temp src: Axillary BP: (!) 142/86 Pulse: 90   Resp: 28 SpO2: 92 % O2 Device: None (Room air) Oxygen Delivery: 5 LPM  Weight: 0 lbs 0 oz  General Appearance: Audible wheezing.  No significant increased work of breathing  Respiratory: No crackles, mild wheezing bilaterally and more upper airway rohnchi/upper airway wheezing  Cardiovascular: RRR, no murmers  GI: obese,, soft and nontender  Skin: no significant LE edema  Other: Neuro grossly intact without focal deficits appreciated     Data   Recent Labs   Lab 12/06/22  0739 12/05/22  0732 12/04/22 2027   WBC 8.9 5.7 9.7   HGB 17.4 17.6 18.7*   MCV 97 96 96   * 121* 141*    137 137   POTASSIUM 4.2 4.0 3.7   CHLORIDE 106 " 106 99   CO2 23 19* 25   BUN 32.0* 28.4* 32.5*   CR 1.09 1.22* 1.60*   ANIONGAP 12 12 13   GEENA 8.5* 8.3* 9.3   * 156* 119*   ALBUMIN  --   --  4.6   PROTTOTAL  --   --  8.3   BILITOTAL  --   --  1.0   ALKPHOS  --   --  70   ALT  --   --  23   AST  --   --  39     Recent Results (from the past 24 hour(s))   Echocardiogram Complete   Result Value    LVEF  > 65%    [facility]    [id number]  [id number]  [id number]  [id number]^LEXX^PATIENCE^[place] [address]     Name: BEATRICE VINES  MRN: [id number]  : 1935  Study Date: 2022 03:57 PM  Age: 87 yrs  Gender: Male  Patient Location: [facility]  Reason For Study: Respiratory Failure  Ordering Physician: PATIENCE HALLMAN  Performed By:      BSA: 1.7 m2  Height: 60 in  Weight: 156 lb  HR: 74  ______________________________________________________________________________  Procedure  Complete Portable Echo Adult.  ______________________________________________________________________________  Interpretation Summary     Left ventricular size, wall motion and function are normal. The ejection  fraction is > 65%.  Normal right ventricle size and systolic function.  The ascending aorta is mildly dilated.  IVC diameter <2.1 cm collapsing >50% with sniff suggests a normal RA pressure  of 3 mmHg.  No hemodynamically significant valvular abnormalities on 2D or color flow  imaging.  ______________________________________________________________________________  Left Ventricle  Left ventricular size, wall motion and function are normal. The ejection  fraction is > 65%. There is mild concentric left ventricular hypertrophy. Left  ventricular diastolic function is normal. Hyperdynamic left ventricular  function. No regional wall motion abnormalities noted.     Right Ventricle  Normal right ventricle size and systolic function.     Atria  Normal left atrial size. Right atrial size is normal. There is no color  Doppler evidence of an atrial  shunt.     Mitral Valve  Mitral valve leaflets appear normal. There is no evidence of mitral stenosis  or clinically significant mitral regurgitation. There is trace to mild mitral  regurgitation.     Tricuspid Valve  Tricuspid valve leaflets appear normal. There is trace to mild tricuspid  regurgitation. Right ventricular systolic pressure could not be approximated  due to inadequate tricuspid regurgitation.     Aortic Valve  There is trivial trileaflet aortic sclerosis. There is mild (1+) aortic  regurgitation.     Pulmonic Valve  The pulmonic valve is not well seen, but is grossly normal. This degree of  valvular regurgitation is within normal limits.     Vessels  The ascending aorta is Mildly dilated. IVC diameter <2.1 cm collapsing >50%  with sniff suggests a normal RA pressure of 3 mmHg.     Pericardium  There is no pericardial effusion.     ______________________________________________________________________________  MMode/2D Measurements & Calculations  IVSd: 1.4 cm  LVIDd: 4.1 cm  LVIDs: 3.2 cm  LVPWd: 1.2 cm     FS: 22.8 %  LV mass(C)d: 189.9 grams  LV mass(C)dI: 113.0 grams/m2  Ao root diam: 3.8 cm  LA dimension: 2.7 cm  asc Aorta Diam: 4.1 cm  LA/Ao: 0.72  LVOT diam: 2.1 cm  LVOT area: 3.4 cm2  LA Volume Indexed (AL/bp): 23.7 ml/m2  RWT: 0.56     Doppler Measurements & Calculations  MV E max herminio: 105.0 cm/sec  MV A max herminio: 113.1 cm/sec  MV E/A: 0.93     MV dec slope: 417.4 cm/sec2  MV dec time: 0.25 sec  Ao V2 max: 180.1 cm/sec  Ao max P.0 mmHg  Ao V2 mean: 142.1 cm/sec  Ao mean P.1 mmHg  Ao V2 VTI: 40.0 cm  JARRET(I,D): 1.8 cm2  JARRET(V,D): 1.8 cm2  AI P1/2t: 669.2 msec  LV V1 max PG: 3.9 mmHg  LV V1 max: 98.1 cm/sec  LV V1 VTI: 21.7 cm  SV(LVOT): 73.2 ml  SI(LVOT): 43.6 ml/m2  PA acc time: 0.10 sec  PI end-d herminio: 133.7 cm/sec  AV Herminio Ratio (DI): 0.54  JARRET Index (cm2/m2): 1.1  E/E' av.7  Lateral E/e': 21.0  Medial E/e': 22.4      ______________________________________________________________________________  Report approved by: Ming Vance 12/05/2022 04:41 PM         CT Chest Pulmonary Embolism w Contrast    Narrative    EXAM: CT CHEST PULMONARY EMBOLISM W CONTRAST  LOCATION: [facility]  DATE/TIME: 12/6/2022 11:52 AM    INDICATION: Chest pain. Shortness of breath.  COMPARISON: 06/10/2022. Others through 06/12/2019.  TECHNIQUE: CT chest pulmonary angiogram during arterial phase injection of IV contrast. Multiplanar reformats and MIP reconstructions were performed. Dose reduction techniques were used.   CONTRAST: isovue 370 100ml.    FINDINGS:  ANGIOGRAM CHEST: No pulmonary embolism.     Stable enlarged ascending aorta up to 4.4 cm. No dissection.    LUNGS AND PLEURA: Areas of increased mild to moderate bilateral airway thickening, pronounced in the lower lungs. Persistent since June 2019, focally thickened and occluded proximal left upper lobe airway(s) (series 4, images 92-96). Similar narrowed   appearance involving the proximal and lateral segment middle lobe airway (images 142-148). Mild right lower lobe predominant scattered endobronchial contents. Along the peripheral right lower lobe, new clustered 2 to 5 mm ill-defined nodules (series 4,   images 196-220). Mild bandlike atelectasis. No pleural effusion or pneumothorax.    MEDIASTINUM/AXILLAE: Stable borderline enlarged mediastinal and hilar nodes; example 12 mm short axis low right paratracheal (series 4, image 96). Normal heart size. No pericardial effusion.    CORONARY ARTERY CALCIFICATION: Mild.    UPPER ABDOMEN: Cholecystectomy. Renal cysts.    MUSCULOSKELETAL: Unremarkable.      Impression    IMPRESSION:    1.  Increased airway thickening since June 2022, compatible with airways disease or bronchitis.    2.  Since June 2019, no significant change in appearance in peribronchial thickening and focal proximal left upper lobe and middle lobe airway  severe narrowing and/or occlusive appearance. Although indeterminate, stability is reassuring for a   nonaggressive process. However, recommend continued follow-up or pulmonary consultation.    3.  Mild retained airway debris (superimposed aspiration not excluded). Mild bronchiectasis.    4.  New mild right lower lobe infectious or inflammatory clustered nodules.    5.  No significant change of borderline enlarged mediastinal and hilar nodes.

## 2022-12-06 NOTE — PROGRESS NOTES
Pt seen on SpO2 95% on 3 lpm NC,pt received Duoneb as schedule. BS  I/E expiratory wheeze with no improvement with neb treatment, RR 30-35 BPM with increase work of breathing- pt placed on BIPAP 12/6, 30% FiO2, tolerating well.

## 2022-12-06 NOTE — PROGRESS NOTES
Patient currently on RA. Patient continues to receive scheduled nebs. BIPAP on standby with setting of 12/6 14 30%. sats 96%. BS wheezes expiratory with increased aeration post neb. No respiratory distress noted at this time.         Zenon Quezada, RT

## 2022-12-06 NOTE — PLAN OF CARE
Goal Outcome Evaluation:      Plan of Care Reviewed With: patient, grandchild(thuy)    Overall Patient Progress: improvingOverall Patient Progress: improving    Outcome Evaluation: Decreased work of brething per patient, off BiPap since 0800, maintaining O2 saturation above 90% on room air. Lungs coarse throughout.

## 2022-12-06 NOTE — PROGRESS NOTES
Care Management Follow Up    Length of Stay (days): 2    Expected Discharge Date: 12/06/2022       Concerns to be Addressed:   Alteration in respiratory status requiring BiPAP (RSV).   Patient plan of care discussed at interdisciplinary rounds: Yes    Anticipated Discharge Disposition:  Home     Anticipated Discharge Services:  Resumed PCA services.  Anticipated Discharge DME:  Per therapy (if indicated). Usually uses a cane as needed for mobility.     Patient/family educated on Medicare website which has current facility and service quality ratings:  NA  Education Provided on the Discharge Plan:  Per team  Patient/Family in Agreement with the Plan:  yes    Referrals Placed by CM/SW:  None  Private pay costs discussed: Not applicable     Additional Information:  Patient lives with his son Robbie and has PCA services. He has access to adult day care as well. His [employer] care coordinator is Katia Omer at [phone].    Merissa Isaacs RN

## 2022-12-07 VITALS
DIASTOLIC BLOOD PRESSURE: 65 MMHG | WEIGHT: 147.3 LBS | SYSTOLIC BLOOD PRESSURE: 143 MMHG | RESPIRATION RATE: 28 BRPM | HEART RATE: 93 BPM | OXYGEN SATURATION: 92 % | BODY MASS INDEX: 28.77 KG/M2 | TEMPERATURE: 98.9 F

## 2022-12-07 LAB
ANION GAP SERPL CALCULATED.3IONS-SCNC: 12 MMOL/L (ref 7–15)
BUN SERPL-MCNC: 31.6 MG/DL (ref 8–23)
CALCIUM SERPL-MCNC: 8.8 MG/DL (ref 8.8–10.2)
CHLORIDE SERPL-SCNC: 100 MMOL/L (ref 98–107)
CREAT SERPL-MCNC: 1.2 MG/DL (ref 0.67–1.17)
DEPRECATED HCO3 PLAS-SCNC: 27 MMOL/L (ref 22–29)
ERYTHROCYTE [DISTWIDTH] IN BLOOD BY AUTOMATED COUNT: 13.1 % (ref 10–15)
GFR SERPL CREATININE-BSD FRML MDRD: 59 ML/MIN/1.73M2
GLUCOSE SERPL-MCNC: 107 MG/DL (ref 70–99)
HCT VFR BLD AUTO: 53.1 % (ref 40–53)
HGB BLD-MCNC: 17.8 G/DL (ref 13.3–17.7)
MCH RBC QN AUTO: 32 PG (ref 26.5–33)
MCHC RBC AUTO-ENTMCNC: 33.5 G/DL (ref 31.5–36.5)
MCV RBC AUTO: 96 FL (ref 78–100)
PLATELET # BLD AUTO: 142 10E3/UL (ref 150–450)
POTASSIUM SERPL-SCNC: 3.5 MMOL/L (ref 3.4–5.3)
RBC # BLD AUTO: 5.56 10E6/UL (ref 4.4–5.9)
SODIUM SERPL-SCNC: 139 MMOL/L (ref 136–145)
WBC # BLD AUTO: 10.2 10E3/UL (ref 4–11)

## 2022-12-07 PROCEDURE — 250N000012 HC RX MED GY IP 250 OP 636 PS 637: Performed by: FAMILY MEDICINE

## 2022-12-07 PROCEDURE — 36415 COLL VENOUS BLD VENIPUNCTURE: CPT | Performed by: FAMILY MEDICINE

## 2022-12-07 PROCEDURE — 250N000009 HC RX 250: Performed by: STUDENT IN AN ORGANIZED HEALTH CARE EDUCATION/TRAINING PROGRAM

## 2022-12-07 PROCEDURE — 94640 AIRWAY INHALATION TREATMENT: CPT | Mod: 76

## 2022-12-07 PROCEDURE — 99239 HOSP IP/OBS DSCHRG MGMT >30: CPT | Performed by: FAMILY MEDICINE

## 2022-12-07 PROCEDURE — 85027 COMPLETE CBC AUTOMATED: CPT | Performed by: FAMILY MEDICINE

## 2022-12-07 PROCEDURE — 250N000011 HC RX IP 250 OP 636: Performed by: FAMILY MEDICINE

## 2022-12-07 PROCEDURE — 94640 AIRWAY INHALATION TREATMENT: CPT

## 2022-12-07 PROCEDURE — 94660 CPAP INITIATION&MGMT: CPT

## 2022-12-07 PROCEDURE — 999N000157 HC STATISTIC RCP TIME EA 10 MIN

## 2022-12-07 PROCEDURE — 250N000013 HC RX MED GY IP 250 OP 250 PS 637: Performed by: FAMILY MEDICINE

## 2022-12-07 PROCEDURE — 80048 BASIC METABOLIC PNL TOTAL CA: CPT | Performed by: FAMILY MEDICINE

## 2022-12-07 RX ORDER — DOXYCYCLINE 100 MG/1
100 CAPSULE ORAL EVERY 12 HOURS
Qty: 8 CAPSULE | Refills: 0 | Status: SHIPPED | OUTPATIENT
Start: 2022-12-07 | End: 2022-12-11

## 2022-12-07 RX ORDER — PREDNISONE 20 MG/1
40 TABLET ORAL DAILY
Qty: 8 TABLET | Refills: 0 | Status: SHIPPED | OUTPATIENT
Start: 2022-12-08 | End: 2022-12-12

## 2022-12-07 RX ADMIN — PREDNISONE 40 MG: 20 TABLET ORAL at 09:03

## 2022-12-07 RX ADMIN — IPRATROPIUM BROMIDE AND ALBUTEROL SULFATE 3 ML: 2.5; .5 SOLUTION RESPIRATORY (INHALATION) at 07:19

## 2022-12-07 RX ADMIN — IPRATROPIUM BROMIDE AND ALBUTEROL SULFATE 3 ML: 2.5; .5 SOLUTION RESPIRATORY (INHALATION) at 11:37

## 2022-12-07 RX ADMIN — DOXYCYCLINE MONOHYDRATE 100 MG: 100 CAPSULE ORAL at 09:03

## 2022-12-07 RX ADMIN — HEPARIN SODIUM 5000 UNITS: 10000 INJECTION, SOLUTION INTRAVENOUS; SUBCUTANEOUS at 05:18

## 2022-12-07 RX ADMIN — LISINOPRIL 20 MG: 20 TABLET ORAL at 09:03

## 2022-12-07 ASSESSMENT — ACTIVITIES OF DAILY LIVING (ADL)
ADLS_ACUITY_SCORE: 22

## 2022-12-07 NOTE — PROGRESS NOTES
Vitals:    12/06/22 2048 12/07/22 0019 12/07/22 0336 12/07/22 0516   BP: (!) 146/79 131/63  (!) 149/77   BP Location: Left arm Right arm  Right arm   Patient Position: Semi-Todd's   Semi-Todd's   Cuff Size: Adult Regular   Adult Regular   Pulse: 83 72  84   Resp: 20 20  28   Temp: 98.9  F (37.2  C) 98.1  F (36.7  C)  98.4  F (36.9  C)   TempSrc: Oral Oral  Oral   SpO2: 93% 96%  95%   Weight:   66.8 kg (147 lb 4.8 oz)      Patient denies pain. Lungs coarse, diminished, expiratory wheezes. Very shallow breaths. Room air. Son at bedside helping with interpreting needs. Med surg. Patient should discharge today 12/7 home w/ son and help of PCA. Naya Robert, SN

## 2022-12-07 NOTE — DISCHARGE SUMMARY
[facility]  Hospitalist Discharge Summary      Date of Admission:  12/4/2022  Date of Discharge:  12/7/2022 12:39 PM  Discharging Provider: Danuta Andino MD  Discharge Service: Hospitalist Service    Discharge Diagnoses       Acute respiratory failure, resolved.    RSV infection    Acute kidney injury, resolved    Hypertension    Hyperlipidemia    Chronic wheezing.  Suggest outpatient follow-up    Follow-ups Needed After Discharge   Follow-up Appointments     Follow-up and recommended labs and tests       Follow up with primary care provider, Allison Belle, within 7 days for   hospital follow- up.  Due to your history of chronic wheezing would   suggest getting formal PFT and consideration of pulmonologist consult if   abnormal             Unresulted Labs Ordered in the Past 30 Days of this Admission     Date and Time Order Name Status Description    12/4/2022  8:24 PM Blood Culture Hand, Right Preliminary     12/4/2022  8:24 PM Blood Culture Peripheral Blood Preliminary           Discharge Disposition   Discharged to home  Condition at discharge: Stable      Hospital Course   Colin Car is a 87 year old male with PMH significant for HTN who presented to ED due to cough and SOB and has been admitted on 12/4/2022 due to respiratory distress and RSV.  He required BiPAP intermittently during his time of stay.  When he was off BiPAP he was not hypoxic but would fatigue.  Family admitted to chronic wheezing over the past several years that has not been worked up.  He was treated with steroids nebs and antibiotics started after prolonged intermittent BiPAP course.  Pulmonology saw patient and agreed with plan.  CT negative for PE or lobar infiltrate.  He remained off BiPAP and in stable condition although still had chronic wheezing which family states was normal and stable for him and he was discharged home in good condition.  Recommend outpatient follow-up and PFTs in the future as suspect some  degree of chronic underlying lung disease contributed to his difficult time with RSV infection.  Discharged in good condition with detailed hospital course per above;     1. Acute Hypoxic Hypercapneic Respiratory Failure-    ---resolved, from RSV  ---d-dimer up, CTA shows increased airway thickening, no change in peribronchial thickening and focal proximal left upper lobe and middle lobe airway severe narrowing; mild bronchiectasis with right lower lobe nodules  ---procal up - start doxycycline .   --- Normal bnp and unremarkable serial troponin  --- no hx COPD, does have underlying stridor versus wheezing at home per family.  ---echo with EF h65% some LVH but no RWMA  ---gave dose lasix today  ---Scheduled duonebs..   ---with wheezing gave steroids short 5-day course    3. JOSÉ MIGUEL-   ---resolved, baseline around 1.16, Likely prerenal.   --- Was on IV fluids  --- resumed home dose of Lisinopril.     4. HTN-   --- home meds initially held due to JOSÉ MIGUEL then resumed  --- PRN IV Hydralazine. Monitor vitals closely.    Hyperlipidemia--- Home statin        Consultations This Hospital Stay   CARE MANAGEMENT / SOCIAL WORK IP CONSULT  PULMONARY IP CONSULT    Code Status   Prior    Time Spent on this Encounter   I, Danuta Andino MD, personally saw the patient today and spent greater than 30 minutes discharging this patient.       Danuta Andino MD  [facility]  [address] [place] [id number]  Phone: [phone]  Fax: [phone]  ______________________________________________________________________    Physical Exam   Vital Signs: Temp: 98.9  F (37.2  C) Temp src: Oral BP: (!) 143/65 Pulse: 93   Resp: 28 SpO2: 92 % O2 Device: None (Room air)    Weight: 147 lbs 4.8 oz  General Appearance: Pleasant male, no apparent distress  Respiratory: Chronic wheezing bilaterally  Cardiovascular: Regular rate and rhythm  GI: Soft and nontender without hepatosplenomegaly  Skin: No significant lower  extremity edema neurologic grossly intact without focal deficits patient  Other: Neurologic grossly intact without focal deficits appreciate       Primary Care Physician   Allison Belle    Discharge Orders      Reason for your hospital stay    RSV infection     Follow-up and recommended labs and tests     Follow up with primary care provider, Allison Belle, within 7 days for hospital follow- up.  Due to your history of chronic wheezing would suggest getting formal PFT and consideration of pulmonologist consult if abnormal     Activity    Your activity upon discharge: activity as tolerated     Diet    Follow this diet upon discharge: Orders Placed This Encounter      Regular Diet Adult       Significant Results and Procedures   Most Recent 3 CBC's:Recent Labs   Lab Test 12/07/22 0433 12/06/22  0739 12/05/22  0732   WBC 10.2 8.9 5.7   HGB 17.8* 17.4 17.6   MCV 96 97 96   * 129* 121*     Most Recent 3 BMP's:Recent Labs   Lab Test 12/07/22 0433 12/06/22  0739 12/05/22  0732    141 137   POTASSIUM 3.5 4.2 4.0   CHLORIDE 100 106 106   CO2 27 23 19*   BUN 31.6* 32.0* 28.4*   CR 1.20* 1.09 1.22*   ANIONGAP 12 12 12   GEENA 8.8 8.5* 8.3*   * 137* 156*     7-Day Micro Results     Collected Updated Procedure Result Status      12/05/2022 0013 12/07/2022 0946 Blood Culture Hand, Right [[id number]]   Blood from Hand, Right    Preliminary result Component Value   Culture No growth after 2 days  [P]                12/04/2022 2053 12/07/2022 0105 Blood Culture Peripheral Blood [[id number]]   Peripheral Blood    Preliminary result Component Value   Culture No growth after 2 days  [P]                12/04/2022 1921 12/04/2022 2009 Symptomatic; Unknown Influenza A/B & SARS-CoV2 (COVID-19) Virus PCR Multiplex Nasopharyngeal [[id number]]    (Abnormal)   Swab from Nasopharyngeal    Final result Component Value   Influenza A PCR Negative   Influenza B PCR Negative   RSV PCR Positive   SARS CoV2 PCR Negative   NEGATIVE:  SARS-CoV-2 (COVID-19) RNA not detected, presumed negative.              ,   Results for orders placed or performed during the hospital encounter of 12/04/22   XR Chest 2 Views    Narrative    EXAM: XR CHEST 2 VIEWS  LOCATION: [facility]  DATE/TIME: 12/4/2022 6:35 PM    INDICATION: sob, cough  COMPARISON: 06/10/2022      Impression    IMPRESSION: Cardiomegaly. Stable appearance of the mediastinum. No acute infiltrate or significant change.   CT Chest Pulmonary Embolism w Contrast    Narrative    EXAM: CT CHEST PULMONARY EMBOLISM W CONTRAST  LOCATION: [facility]  DATE/TIME: 12/6/2022 11:52 AM    INDICATION: Chest pain. Shortness of breath.  COMPARISON: 06/10/2022. Others through 06/12/2019.  TECHNIQUE: CT chest pulmonary angiogram during arterial phase injection of IV contrast. Multiplanar reformats and MIP reconstructions were performed. Dose reduction techniques were used.   CONTRAST: isovue 370 100ml.    FINDINGS:  ANGIOGRAM CHEST: No pulmonary embolism.     Stable enlarged ascending aorta up to 4.4 cm. No dissection.    LUNGS AND PLEURA: Areas of increased mild to moderate bilateral airway thickening, pronounced in the lower lungs. Persistent since June 2019, focally thickened and occluded proximal left upper lobe airway(s) (series 4, images 92-96). Similar narrowed   appearance involving the proximal and lateral segment middle lobe airway (images 142-148). Mild right lower lobe predominant scattered endobronchial contents. Along the peripheral right lower lobe, new clustered 2 to 5 mm ill-defined nodules (series 4,   images 196-220). Mild bandlike atelectasis. No pleural effusion or pneumothorax.    MEDIASTINUM/AXILLAE: Stable borderline enlarged mediastinal and hilar nodes; example 12 mm short axis low right paratracheal (series 4, image 96). Normal heart size. No pericardial effusion.    CORONARY ARTERY CALCIFICATION: Mild.    UPPER ABDOMEN: Cholecystectomy.  Renal cysts.    MUSCULOSKELETAL: Unremarkable.      Impression    IMPRESSION:    1.  Increased airway thickening since 2022, compatible with airways disease or bronchitis.    2.  Since 2019, no significant change in appearance in peribronchial thickening and focal proximal left upper lobe and middle lobe airway severe narrowing and/or occlusive appearance. Although indeterminate, stability is reassuring for a   nonaggressive process. However, recommend continued follow-up or pulmonary consultation.    3.  Mild retained airway debris (superimposed aspiration not excluded). Mild bronchiectasis.    4.  New mild right lower lobe infectious or inflammatory clustered nodules.    5.  No significant change of borderline enlarged mediastinal and hilar nodes.       Echocardiogram Complete     Value    LVEF  > 65%    [facility]    [id number]  [id number]  [id number]  [id number]^LEXX^PATIENCE^[place] [address]     Name: BEATRICE VINES  MRN: [id number]  : 1935  Study Date: 2022 03:57 PM  Age: 87 yrs  Gender: Male  Patient Location: [facility]  Reason For Study: Respiratory Failure  Ordering Physician: PATIENCE HALLMAN  Performed By: NEVA     BSA: 1.7 m2  Height: 60 in  Weight: 156 lb  HR: 74  ______________________________________________________________________________  Procedure  Complete Portable Echo Adult.  ______________________________________________________________________________  Interpretation Summary     Left ventricular size, wall motion and function are normal. The ejection  fraction is > 65%.  Normal right ventricle size and systolic function.  The ascending aorta is mildly dilated.  IVC diameter <2.1 cm collapsing >50% with sniff suggests a normal RA pressure  of 3 mmHg.  No hemodynamically significant valvular abnormalities on 2D or color flow  imaging.  ______________________________________________________________________________  Left Ventricle  Left  ventricular size, wall motion and function are normal. The ejection  fraction is > 65%. There is mild concentric left ventricular hypertrophy. Left  ventricular diastolic function is normal. Hyperdynamic left ventricular  function. No regional wall motion abnormalities noted.     Right Ventricle  Normal right ventricle size and systolic function.     Atria  Normal left atrial size. Right atrial size is normal. There is no color  Doppler evidence of an atrial shunt.     Mitral Valve  Mitral valve leaflets appear normal. There is no evidence of mitral stenosis  or clinically significant mitral regurgitation. There is trace to mild mitral  regurgitation.     Tricuspid Valve  Tricuspid valve leaflets appear normal. There is trace to mild tricuspid  regurgitation. Right ventricular systolic pressure could not be approximated  due to inadequate tricuspid regurgitation.     Aortic Valve  There is trivial trileaflet aortic sclerosis. There is mild (1+) aortic  regurgitation.     Pulmonic Valve  The pulmonic valve is not well seen, but is grossly normal. This degree of  valvular regurgitation is within normal limits.     Vessels  The ascending aorta is Mildly dilated. IVC diameter <2.1 cm collapsing >50%  with sniff suggests a normal RA pressure of 3 mmHg.     Pericardium  There is no pericardial effusion.     ______________________________________________________________________________  MMode/2D Measurements & Calculations  IVSd: 1.4 cm  LVIDd: 4.1 cm  LVIDs: 3.2 cm  LVPWd: 1.2 cm     FS: 22.8 %  LV mass(C)d: 189.9 grams  LV mass(C)dI: 113.0 grams/m2  Ao root diam: 3.8 cm  LA dimension: 2.7 cm  asc Aorta Diam: 4.1 cm  LA/Ao: 0.72  LVOT diam: 2.1 cm  LVOT area: 3.4 cm2  LA Volume Indexed (AL/bp): 23.7 ml/m2  RWT: 0.56     Doppler Measurements & Calculations  MV E max amy: 105.0 cm/sec  MV A max amy: 113.1 cm/sec  MV E/A: 0.93     MV dec slope: 417.4 cm/sec2  MV dec time: 0.25 sec  Ao V2 max: 180.1 cm/sec  Ao max P.0  mmHg  Ao V2 mean: 142.1 cm/sec  Ao mean P.1 mmHg  Ao V2 VTI: 40.0 cm  JARRET(I,D): 1.8 cm2  JARRET(V,D): 1.8 cm2  AI P1/2t: 669.2 msec  LV V1 max PG: 3.9 mmHg  LV V1 max: 98.1 cm/sec  LV V1 VTI: 21.7 cm  SV(LVOT): 73.2 ml  SI(LVOT): 43.6 ml/m2  PA acc time: 0.10 sec  PI end-d herminio: 133.7 cm/sec  AV Herminio Ratio (DI): 0.54  JARRET Index (cm2/m2): 1.1  E/E' av.7  Lateral E/e': 21.0  Medial E/e': 22.4     ______________________________________________________________________________  Report approved by: Ming Vance 2022 04:41 PM               Discharge Medications   Discharge Medication List as of 2022 11:41 AM      START taking these medications    Details   doxycycline monohydrate (MONODOX) 100 MG capsule Take 1 capsule (100 mg) by mouth every 12 hours for 4 days, Disp-8 capsule, R-0, E-Prescribe      predniSONE (DELTASONE) 20 MG tablet Take 2 tablets (40 mg) by mouth daily for 4 days, Disp-8 tablet, R-0, E-Prescribe         CONTINUE these medications which have NOT CHANGED    Details   acetaminophen (TYLENOL EXTRA STRENGTH) 500 MG tablet [ACETAMINOPHEN (TYLENOL EXTRA STRENGTH) 500 MG TABLET] Take 1 tablet (500 mg total) by mouth every 4 (four) hours as needed for pain., Disp-30 tablet, R-0, Normal      atorvastatin (LIPITOR) 40 MG tablet TAKE 1 PILL BY MOUTH EVERY DAY/TXHUA HNUB NOJ 1 LUB TSHUAJ PAB NTSHAV MUAJ ROJ, Disp-90 tablet, R-2, E-Prescribe      calcium carbonate-vitamin D3 (CALTRATE 600 PLUS D3) 600 mg(1,500mg) -400 unit per tablet [CALCIUM CARBONATE-VITAMIN D3 (CALTRATE 600 PLUS D3) 600 MG(1,500MG) -400 UNIT PER TABLET] TAKE 1 PILL BY MOUTH EVERY DAY/NORMA LAST NOJ 1 ANCELMO FERRERJ KACY POBTXHA, Disp-90 tablet, R-0, Normal      lisinopril-hydrochlorothiazide (ZESTORETIC) 20-25 MG tablet Take 1 tablet by mouth daily, Disp-90 tablet, R-1, E-Prescribe           Allergies   No Known Allergies

## 2022-12-07 NOTE — PLAN OF CARE
Goal Outcome Evaluation:      Plan of Care Reviewed With: patient             Vitals:    12/06/22 1716 12/06/22 1744 12/06/22 2015 12/06/22 2048   BP:  (!) 179/84  (!) 146/79   BP Location:    Left arm   Patient Position:    Semi-Todd's   Cuff Size:    Adult Regular   Pulse:  74 78 83   Resp:  20  20   Temp:  98.9  F (37.2  C)  98.9  F (37.2  C)   TempSrc:  Oral  Oral   SpO2: 98% 94% 97% 93%   Weight:  67 kg (147 lb 12.8 oz)      Denies pain. Lungs are very coarse and diminished. Exp wheezes. Son at bedside to help with needs and interpreting. Had troubles communicating. Med surg. Plans to discharge in am. Bipap not on. Room air. Nicky Louie RN

## 2022-12-07 NOTE — CONSULTS
"PULMONARY MEDICINE CONSULT  12/6/2022    Admit Date: 12/4/2022  CODE: Full Code    Reason for Consult: acute respiratory failure, RSV bronchitis    Assessment/Plan:   87 year old male never smoker with a history of dyslipidemia, HTN, hypothyroidism, and \"chronic wheezing\" per family member, presented on 12/4 with two days of dyspnea, dry cough, and wheezing, found to have RSV bronchitis with mild cylindrical bronchiectasis on CT.    Acute respiratory failure, RSV bronchitis: Likely baseline asthma, with wheezing \"for years\" per family, no prior evaluation. Nonsmoker. Has subtle cylindrical bronchiectasis, may be age-related. Definite bronchial wall thickening on CT c/w bronchitis likely due to RSV. Loud wheezing on exam but stable vitals and work of breathing, no evidence of hypercapnia.    Plan:  - trial off BiPAP  - continue scheduled nebulized ipratropium-albuterol  - agree with prednisone burst  - empiric doxycycline is reasonable, can convert to PO for total of 5 days on discharge  - likely to take weeks to completely resolve wheezing, but if persists PCP can refer to pulmonary clinic for PFTs and evaluation, especially as he complains of chronic wheezing  - will follow    Kelechi Falcon MD  Pulmonary and Critical Care Medicine  [facility]  [place]  Office [phone]  Pager [phone]  he/him/his                                                                                                                                                        HPI:   CCx: acute respiratory failure, RSV bronchitis    HPI: 87 year old male never smoker with a history of dyslipidemia, HTN, hypothyroidism, and \"chronic wheezing\" per family member, presented on 12/4 with two days of dyspnea, dry cough, and wheezing, found to have RSV bronchitis with mild cylindrical bronchiectasis on CT. States several days of dry cough, wheeze. No fever. Has been on nebs and prednisone, still wheezing, requesting BiPAP " "intermittently.                                                                                                                                                        Past Medical History:  Dyslipidemia  HTN  Hypothyroidism  \"chronic wheezing\" per family member    Past Surgical History  Past Surgical History:   Procedure Laterality Date     ENDOSCOPIC RETROGRADE CHOLANGIOPANCREATOGRAM N/A 6/2/2018    Procedure: ENDOSCOPIC RETROGRADE CHOLANGIOPANCREATOGRAPHY; SPHINCTEROTOMY AND STONE EXTRACTION;  Surgeon: Juve Shannon MD;  Location: [facility];  Service:      LAPAROSCOPIC CHOLECYSTECTOMY N/A 6/1/2018    Procedure: CHOLECYSTECTOMY, LAPAROSCOPIC, COMMON DUCT EXPLORATION;  Surgeon: Meir Beck MD;  Location: [facility];  Service:      NO PAST SURGERIES         Allergies:  No Known Allergies    PTA medications:  Medications Prior to Admission   Medication Sig Dispense Refill Last Dose     acetaminophen (TYLENOL EXTRA STRENGTH) 500 MG tablet [ACETAMINOPHEN (TYLENOL EXTRA STRENGTH) 500 MG TABLET] Take 1 tablet (500 mg total) by mouth every 4 (four) hours as needed for pain. 30 tablet 0 Unknown     atorvastatin (LIPITOR) 40 MG tablet TAKE 1 PILL BY MOUTH EVERY DAY/TX[facility]UA [facility]NUB NOJ [facility] NTSHAV MUAJ ROJ 90 tablet 2 Unknown     calcium carbonate-vitamin D3 (CALTRATE 600 PLUS D3) 600 mg(1,500mg) -400 unit per tablet [CALCIUM CARBONATE-VITAMIN D3 (CALTRATE 600 PLUS D3) 600 MG(1,500MG) -400 UNIT PER TABLET] TAKE 1 PILL BY MOUTH EVERY DAY/TXHUA HNUB NOJ 1 LUB TSHUA PAB POBTXHA 90 tablet 0 Unknown     lisinopril-hydrochlorothiazide (ZESTORETIC) 20-25 MG tablet Take 1 tablet by mouth daily 90 tablet 1 12/4/2022       Family Hx:  Family History   Problem Relation Age of Onset     No Known Problems Mother      No Known Problems Father      No Known Problems Brother      No Known Problems Brother        Social Hx:  Social History     Socioeconomic History     Marital status: Single     Spouse name: " Not on file     Number of children: Not on file     Years of education: Not on file     Highest education level: Not on file   Occupational History     Not on file   Tobacco Use     Smoking status: Never     Smokeless tobacco: Never   Vaping Use     Vaping Use: Never used   Substance and Sexual Activity     Alcohol use: No     Drug use: No     Sexual activity: Not on file   Other Topics Concern     Not on file   Social History Narrative    He lives with his son, daughter-in-law, and their children.  He is .  He has 3 children total. - Dr. Brown 01/08/21      Social Determinants of Health     Financial Resource Strain: Not on file   Food Insecurity: Not on file   Transportation Needs: Not on file   Physical Activity: Not on file   Stress: Not on file   Social Connections: Not on file   Intimate Partner Violence: Not on file   Housing Stability: Not on file       Exam/Data:     Vitals  BP (!) 179/84   Pulse 74   Temp 98.9  F (37.2  C) (Oral)   Resp 20   Wt 67 kg (147 lb 12.8 oz)   SpO2 94%   BMI 28.87 kg/m    BP - Mean:  [] 92  I/O last 3 completed shifts:  In: -   Out: 800 [Urine:800]  Weight change:   [unfilled]  EXAM:  BP (!) 179/84   Pulse 74   Temp 98.9  F (37.2  C) (Oral)   Resp 20   Wt 67 kg (147 lb 12.8 oz)   SpO2 94%   BMI 28.87 kg/m      Intake/Output last 3 shifts:  I/O last 3 completed shifts:  In: -   Out: 800 [Urine:800]  Intake/Output this shift:  I/O this shift:  In: -   Out: 650 [Urine:650]    Physical Exam  Gen: alert, oriented, no distress on BiPAP  HEENT: NT, no DAVIN  CV: RRR, no m/g/r  Resp: diffuse bilateral wheezing  Abd: soft, nontender, BS+  Skin: no rashes or lesions  Ext: no edema  Neuro: PERRL, nonfocal exam    ROS: A complete 10-system review of systems was obtained and is negative with the exception of what is noted in the history of present illness.    Medications:       - MEDICATION INSTRUCTIONS -       - MEDICATION INSTRUCTIONS -         atorvastatin  40 mg Oral  QPM     doxycycline monohydrate  100 mg Oral Q12H [facility] (08/20)     heparin ANTICOAGULANT  5,000 Units Subcutaneous Q12H     ipratropium - albuterol 0.5 mg/2.5 mg/3 mL  3 mL Nebulization 4x daily     lisinopril  20 mg Oral Daily     predniSONE  40 mg Oral Daily     sodium chloride (PF)  3 mL Intracatheter Q8H         DATA  All laboratory and radiology has been personally reviewed by myself today.  Recent Labs   Lab 12/06/22  0739   WBC 8.9   HGB 17.4   HCT 52.6   *     Recent Labs   Lab 12/06/22  0739 12/05/22  0732 12/04/22 2027    137 137   CO2 23 19* 25   BUN 32.0* 28.4* 32.5*   ALKPHOS  --   --  70   ALT  --   --  23   AST  --   --  39       PFT DATA:  None on record      IMAGING:   Chest CTA (12/6/22):  - images directly reviewed, formal interpretation follows:  LUNGS AND PLEURA: Areas of increased mild to moderate bilateral airway thickening, pronounced in the lower lungs. Persistent since June 2019, focally thickened and occluded proximal left upper lobe airway(s) (series 4, images 92-96). Similar narrowed   appearance involving the proximal and lateral segment middle lobe airway (images 142-148). Mild right lower lobe predominant scattered endobronchial contents. Along the peripheral right lower lobe, new clustered 2 to 5 mm ill-defined nodules (series 4,   images 196-220). Mild bandlike atelectasis. No pleural effusion or pneumothorax.     MEDIASTINUM/AXILLAE: Stable borderline enlarged mediastinal and hilar nodes; example 12 mm short axis low right paratracheal (series 4, image 96). Normal heart size. No pericardial effusion.     CORONARY ARTERY CALCIFICATION: Mild.     UPPER ABDOMEN: Cholecystectomy. Renal cysts.     MUSCULOSKELETAL: Unremarkable.                                                                      IMPRESSION:     1.  Increased airway thickening since June 2022, compatible with airways disease or bronchitis.     2.  Since June 2019, no significant change in appearance in  peribronchial thickening and focal proximal left upper lobe and middle lobe airway severe narrowing and/or occlusive appearance. Although indeterminate, stability is reassuring for a   nonaggressive process. However, recommend continued follow-up or pulmonary consultation.     3.  Mild retained airway debris (superimposed aspiration not excluded). Mild bronchiectasis.     4.  New mild right lower lobe infectious or inflammatory clustered nodules.     5.  No significant change of borderline enlarged mediastinal and hilar nodes.

## 2022-12-07 NOTE — PROGRESS NOTES
Room air SpO2 96 %, BS mild E/wheezes upper lobes ( possibly upper airway transmitted to upper lobes). Duoneb tx given. Tolerated well, BS unchanged after.    BP (!) 149/77 (BP Location: Right arm, Patient Position: Semi-Todd's, Cuff Size: Adult Regular)   Pulse 84   Temp 98.4  F (36.9  C) (Oral)   Resp 28   Wt 66.8 kg (147 lb 4.8 oz)   SpO2 95%   BMI 28.77 kg/m      Rt TO MONITOR

## 2022-12-07 NOTE — PLAN OF CARE
Problem: Plan of Care - These are the overarching goals to be used throughout the patient stay.    Goal: Readiness for Transition of Care  Outcome: Met   Goal Outcome Evaluation:      Patient given discharge orders.  AVS reviewed with son for interpreting reasons.   Son to transport home.

## 2022-12-07 NOTE — PROGRESS NOTES
[facility]    Medicine Progress Note - Hospitalist Service    Date of Admission:  12/4/2022    Assessment & Plan      Colin Car is a 87 year old male with PMH significant for HTN who presented to ED due to cough and SOB and has been admitted on 12/4/2022 due to respiratory distress and RSV.     1. Acute Hypoxic Respiratory Failure-    ---suspect from RSV  ---unable to stay off bypap - will consult pulm  ---d-dimer up, CTA shows increased airway thickening, no change in peribronchial thickening and focal proximal left upper lobe and middle lobe airway severe narrowing; mild bronchiectasis with right lower lobe nodules  ---procal up - start doxycycline   ---RSV positive.   --- Normal bnp and unremarkable serial troponin  --- no hx COPD, does have underlying stridor versus wheezing at home per family.  ---echo with EF h65% some LVH but no RWMA  ---give dose lasix today  ---Scheduled duonebs.  --- Stop telemetry.   ---with wheezing start steroids short 5-day course    3. JOSÉ MIGUEL-   ---resolved, baseline around 1.16, Likely prerenal.   --- Was on IV fluids  --- resumed home dose of Lisinopril.     4. HTN-   --- Blood pressures trending up.  Did resume home lisinopril  --Consider resuming home hydrochlorothiazide if BMP stable tomorrow.  --- PRN IV Hydralazine. Monitor vitals closely.    Hyperlipidemia--- Home statin    Subclinical hypothyroidism--not on meds for this       Diet: Regular Diet Adult    DVT Prophylaxis: Heparin SQ  Logan Catheter: Not present  Central Lines: None  Cardiac Monitoring: None  Code Status: Full Code      Disposition Plan     Expected Discharge Date: 12/07/2022        Discharge Comments: pulm consult and stay off bypap        The patient's care was discussed with the Bedside Nurse, Patient, Patient's Family and RT.    Danuta Andino MD  Hospitalist Service  [facility]  Securely message with the Vocera Web Console (learn more here)  Text page via  "[facility] Paging/Directory         Clinically Significant Risk Factors                # Thrombocytopenia: Lowest platelets = 129 (Ref range: 150-450) in last 2 days, will monitor for bleeding                 ______________________________________________________________________    Interval History   ---Patient againseen in presence of the [employer]  and his grandson  --- Needed to be placed on BiPAP overnight a few times.  Was just taken off when I come to see him and he was adamant earlier that he wanted to go home.  He wanted to try his own treatments at home and come back if he needed to.  Later in the afternoon he called the nurse and due to fatigue from breathing and requested BiPAP to be placed back on.  --- Still states breathing better than upon admission  ---denies previous lung disease or smoking, but grandson says that he has \"noisy breathing\" for several years.  He has never been on an inhaler or had it worked up per him    Data reviewed today: I reviewed all medications, new labs and imaging results over the last 24 hours.     Physical Exam   Vital Signs: Temp: 98.9  F (37.2  C) Temp src: Oral BP: (!) 143/65 Pulse: 93   Resp: 28 SpO2: 92 % O2 Device: None (Room air)    Weight: 147 lbs 4.8 oz  General Appearance: Audible wheezing.  No significant increased work of breathing  Respiratory: No crackles, mild wheezing bilaterally and more upper airway rohnchi/upper airway wheezing  Cardiovascular: RRR, no murmers  GI: obese,, soft and nontender  Skin: no significant LE edema  Other: Neuro grossly intact without focal deficits appreciated     Data   Recent Labs   Lab 12/07/22  0433 12/06/22  0739 12/05/22  0732 12/04/22 2027   WBC 10.2 8.9 5.7 9.7   HGB 17.8* 17.4 17.6 18.7*   MCV 96 97 96 96   * 129* 121* 141*    141 137 137   POTASSIUM 3.5 4.2 4.0 3.7   CHLORIDE 100 106 106 99   CO2 27 23 19* 25   BUN 31.6* 32.0* 28.4* 32.5*   CR 1.20* 1.09 1.22* 1.60*   ANIONGAP 12 12 12 13   GEENA 8.8 8.5* " 8.3* 9.3   * 137* 156* 119*   ALBUMIN  --   --   --  4.6   PROTTOTAL  --   --   --  8.3   BILITOTAL  --   --   --  1.0   ALKPHOS  --   --   --  70   ALT  --   --   --  23   AST  --   --   --  39     Recent Results (from the past 24 hour(s))   CT Chest Pulmonary Embolism w Contrast    Narrative    EXAM: CT CHEST PULMONARY EMBOLISM W CONTRAST  LOCATION: [facility]  DATE/TIME: 12/6/2022 11:52 AM    INDICATION: Chest pain. Shortness of breath.  COMPARISON: 06/10/2022. Others through 06/12/2019.  TECHNIQUE: CT chest pulmonary angiogram during arterial phase injection of IV contrast. Multiplanar reformats and MIP reconstructions were performed. Dose reduction techniques were used.   CONTRAST: isovue 370 100ml.    FINDINGS:  ANGIOGRAM CHEST: No pulmonary embolism.     Stable enlarged ascending aorta up to 4.4 cm. No dissection.    LUNGS AND PLEURA: Areas of increased mild to moderate bilateral airway thickening, pronounced in the lower lungs. Persistent since June 2019, focally thickened and occluded proximal left upper lobe airway(s) (series 4, images 92-96). Similar narrowed   appearance involving the proximal and lateral segment middle lobe airway (images 142-148). Mild right lower lobe predominant scattered endobronchial contents. Along the peripheral right lower lobe, new clustered 2 to 5 mm ill-defined nodules (series 4,   images 196-220). Mild bandlike atelectasis. No pleural effusion or pneumothorax.    MEDIASTINUM/AXILLAE: Stable borderline enlarged mediastinal and hilar nodes; example 12 mm short axis low right paratracheal (series 4, image 96). Normal heart size. No pericardial effusion.    CORONARY ARTERY CALCIFICATION: Mild.    UPPER ABDOMEN: Cholecystectomy. Renal cysts.    MUSCULOSKELETAL: Unremarkable.      Impression    IMPRESSION:    1.  Increased airway thickening since June 2022, compatible with airways disease or bronchitis.    2.  Since June 2019, no significant change  in appearance in peribronchial thickening and focal proximal left upper lobe and middle lobe airway severe narrowing and/or occlusive appearance. Although indeterminate, stability is reassuring for a   nonaggressive process. However, recommend continued follow-up or pulmonary consultation.    3.  Mild retained airway debris (superimposed aspiration not excluded). Mild bronchiectasis.    4.  New mild right lower lobe infectious or inflammatory clustered nodules.    5.  No significant change of borderline enlarged mediastinal and hilar nodes.

## 2022-12-07 NOTE — PROGRESS NOTES
Room air SpO2 95 %, BS scattered I/E wheezes (may be transmitted upper airway sounds). MD at bedside for pulmonary evaluation, concerned for hx of persistent wheezing. Duoneb tx given, tolerated well, BS same after , rhonchi RLL.     BP (!) 143/65 (BP Location: Left arm)   Pulse 93   Temp 98.9  F (37.2  C) (Oral)   Resp 28   Wt 66.8 kg (147 lb 4.8 oz)   SpO2 92%   BMI 28.77 kg/m      RT to monitor.

## 2022-12-07 NOTE — PROGRESS NOTES
RCAT Treatment Plan    Patient Score: 11  Patient Acuity: 3    Clinical Indication for Therapy: RSV, bronchitis, bronchiectasis    Therapy Ordered: Duoneb HHN QID    Assessment Summary: RSV with bronchitis, hx bronchiectasis. CXR minimal findings, see CT scan results below, A/hypercapnic resp failure, BS bilat E/wheezes (may be transmitted from upper airway. No COPD history, never smoked. Room air SpO2 92-94 %. Will continue with current tx. BiPAP standby at bedside.     Lorenzo Holden, RT  12/7/2022        CT scan 12/06/22 IMPRESSION:     1.  Increased airway thickening since June 2022, compatible with airways disease or bronchitis.     2.  Since June 2019, no significant change in appearance in peribronchial thickening and focal proximal left upper lobe and middle lobe airway severe narrowing and/or occlusive appearance. Although indeterminate, stability is reassuring for a   nonaggressive process. However, recommend continued follow-up or pulmonary consultation.     3.  Mild retained airway debris (superimposed aspiration not excluded). Mild bronchiectasis.     4.  New mild right lower lobe infectious or inflammatory clustered nodules.     5.  No significant change of borderline enlarged mediastinal and hilar nodes

## 2022-12-10 LAB
BACTERIA BLD CULT: NO GROWTH
BACTERIA BLD CULT: NO GROWTH

## 2023-05-15 ENCOUNTER — PATIENT OUTREACH (OUTPATIENT)
Dept: GERIATRIC MEDICINE | Facility: CLINIC | Age: 88
End: 2023-05-15
Payer: COMMERCIAL

## 2023-05-15 NOTE — PROGRESS NOTES
[facility] Care Coordination Contact    Member became effective with  [employer] on 5/1/2023 with [facility].  Previous Health Plan: [employer]  Previous Care System: [employer]  Previous care coordinators name and number: LATASHA BAJWA  Sabrina Type: EW  Last MMIS Entry: Date 8/5/22 and Type 06  MMIS visit date (and type) if different from above: na  Services Listed in MMIS: 22 F DAY SVC 08 I MED APPTS 37 F CASE MGMT  01 I GROC SHOP 06 F HOMEMAKER 64 F LAUNDRY  47 F WVRAC WILDE 68 F PCA SUPER 18 F PERSONAL  07 I MONEY MGT  UTF received: Yes: Received and saved to member file  Address/Phone discrepancy: na    Lena GraysonFormerly [facility]  Case Management Specialist  [phone]

## 2023-05-15 NOTE — LETTER
May 15, 2023    Important [employer] Information    BEATRICE BUENROSTROONG  [address] [address]    Your New Care Coordinator  Dear Beatrice,  My name is Shazia Quezada RN, PHN  and I am your new Care Coordinator. You may reach me by calling [phone]. I will be in touch with you shortly to address any questions you may have.   I have also been in contact with  LATASHA BAJWA , your previous care coordinator, to ensure a smooth transition.  Questions?  Call me at [phone] Monday-Friday between 8am and 5pm. TTY: [phone]. I look forward to working with you as a [employer]  member.  Sincerely,      Shazia Quezada RN, PHN  [phone]  [email or web address]      [facility]    cc: member records

## 2023-05-18 ENCOUNTER — PATIENT OUTREACH (OUTPATIENT)
Dept: GERIATRIC MEDICINE | Facility: CLINIC | Age: 88
End: 2023-05-18
Payer: COMMERCIAL

## 2023-05-18 NOTE — PROGRESS NOTES
[facility] Care Coordination Contact    [facility] Change (Transfer)    Member is new enrollee to [employer] effective 5/1/2023 with [employer] [facility] [employer] [facility]. Member transferred from [facility] [employer].    No home visit required because this care coordinator (CC) has received all required documentation from the previous CC.    Writer t/c to member, introduced self as member's new CC. Confirmed with member that the welcome letter with writer's name and contact information has been received.  Reviewed LTCC/Health Risk Assessment (HRA) and POC with member. No changes noted.  Transitional HRA completed. Care Plan Summary updated and reflects current services.  Required referral authorization information communicated to CMS: Yes    Writer reviewed the following with member:    ER visits: No  Hospitalizations: No  TCU stays: No  Significant health status changes: None  Falls/Injuries: No  ADL/IADL changes: No  Changes in services: No    Follow-Up Plan: Member informed of future contact, plan to f/u with member with at next regularly scheduled contact.  Contact information shared with member and family, encouraged member to call with any questions or concerns.    Shazia Quezada RN, BSN, PHN  [facility]  Phone: [phone]

## 2023-05-22 ENCOUNTER — OFFICE VISIT (OUTPATIENT)
Dept: INTERNAL MEDICINE | Facility: CLINIC | Age: 88
End: 2023-05-22
Payer: COMMERCIAL

## 2023-05-22 VITALS
BODY MASS INDEX: 31.65 KG/M2 | OXYGEN SATURATION: 98 % | HEIGHT: 60 IN | RESPIRATION RATE: 24 BRPM | DIASTOLIC BLOOD PRESSURE: 98 MMHG | HEART RATE: 80 BPM | WEIGHT: 161.2 LBS | SYSTOLIC BLOOD PRESSURE: 140 MMHG | TEMPERATURE: 98.4 F

## 2023-05-22 DIAGNOSIS — D58.2 ELEVATED HEMOGLOBIN (H): ICD-10-CM

## 2023-05-22 DIAGNOSIS — E78.2 MIXED HYPERLIPIDEMIA: ICD-10-CM

## 2023-05-22 DIAGNOSIS — I10 PRIMARY HYPERTENSION: ICD-10-CM

## 2023-05-22 DIAGNOSIS — E03.9 ACQUIRED HYPOTHYROIDISM: ICD-10-CM

## 2023-05-22 DIAGNOSIS — I71.60 THORACOABDOMINAL AORTIC ANEURYSM (TAAA) WITHOUT RUPTURE, UNSPECIFIED PART (H): ICD-10-CM

## 2023-05-22 DIAGNOSIS — N18.31 STAGE 3A CHRONIC KIDNEY DISEASE (H): ICD-10-CM

## 2023-05-22 DIAGNOSIS — E03.8 SUBCLINICAL HYPOTHYROIDISM: ICD-10-CM

## 2023-05-22 PROBLEM — N17.9 AKI (ACUTE KIDNEY INJURY) (H): Status: RESOLVED | Noted: 2018-06-01 | Resolved: 2023-05-22

## 2023-05-22 PROBLEM — J20.5 ACUTE BRONCHITIS DUE TO RESPIRATORY SYNCYTIAL VIRUS (RSV): Status: RESOLVED | Noted: 2022-12-04 | Resolved: 2023-05-22

## 2023-05-22 PROBLEM — J96.02 ACUTE RESPIRATORY FAILURE WITH HYPERCAPNIA (H): Status: RESOLVED | Noted: 2022-12-04 | Resolved: 2023-05-22

## 2023-05-22 PROBLEM — I71.21 THORACIC ASCENDING AORTIC ANEURYSM (H): Status: RESOLVED | Noted: 2022-05-06 | Resolved: 2023-05-22

## 2023-05-22 PROBLEM — R17 TOTAL BILIRUBIN, ELEVATED: Status: RESOLVED | Noted: 2018-06-01 | Resolved: 2023-05-22

## 2023-05-22 LAB
ALT SERPL W P-5'-P-CCNC: 14 U/L (ref 10–50)
ANION GAP SERPL CALCULATED.3IONS-SCNC: 13 MMOL/L (ref 7–15)
BUN SERPL-MCNC: 25.6 MG/DL (ref 8–23)
CALCIUM SERPL-MCNC: 9.7 MG/DL (ref 8.8–10.2)
CHLORIDE SERPL-SCNC: 103 MMOL/L (ref 98–107)
CHOLEST SERPL-MCNC: 192 MG/DL
CREAT SERPL-MCNC: 1.32 MG/DL (ref 0.67–1.17)
CREAT UR-MCNC: 79.7 MG/DL
DEPRECATED HCO3 PLAS-SCNC: 23 MMOL/L (ref 22–29)
ERYTHROCYTE [DISTWIDTH] IN BLOOD BY AUTOMATED COUNT: 12.8 % (ref 10–15)
GFR SERPL CREATININE-BSD FRML MDRD: 53 ML/MIN/1.73M2
GLUCOSE SERPL-MCNC: 103 MG/DL (ref 70–99)
HCT VFR BLD AUTO: 51.6 % (ref 40–53)
HDLC SERPL-MCNC: 43 MG/DL
HGB BLD-MCNC: 17.5 G/DL (ref 13.3–17.7)
LDLC SERPL CALC-MCNC: 103 MG/DL
MCH RBC QN AUTO: 32 PG (ref 26.5–33)
MCHC RBC AUTO-ENTMCNC: 33.9 G/DL (ref 31.5–36.5)
MCV RBC AUTO: 94 FL (ref 78–100)
MICROALBUMIN UR-MCNC: 166 MG/L
MICROALBUMIN/CREAT UR: 208.28 MG/G CR (ref 0–17)
NONHDLC SERPL-MCNC: 149 MG/DL
PLATELET # BLD AUTO: 134 10E3/UL (ref 150–450)
POTASSIUM SERPL-SCNC: 4.2 MMOL/L (ref 3.4–5.3)
RBC # BLD AUTO: 5.47 10E6/UL (ref 4.4–5.9)
SODIUM SERPL-SCNC: 139 MMOL/L (ref 136–145)
T4 FREE SERPL-MCNC: 0.74 NG/DL (ref 0.9–1.7)
TRIGL SERPL-MCNC: 229 MG/DL
TSH SERPL DL<=0.005 MIU/L-ACNC: 11.6 UIU/ML (ref 0.3–4.2)
WBC # BLD AUTO: 5.8 10E3/UL (ref 4–11)

## 2023-05-22 PROCEDURE — 99214 OFFICE O/P EST MOD 30 MIN: CPT | Mod: 25 | Performed by: NURSE PRACTITIONER

## 2023-05-22 PROCEDURE — 82043 UR ALBUMIN QUANTITATIVE: CPT | Performed by: NURSE PRACTITIONER

## 2023-05-22 PROCEDURE — 80048 BASIC METABOLIC PNL TOTAL CA: CPT | Performed by: NURSE PRACTITIONER

## 2023-05-22 PROCEDURE — 84443 ASSAY THYROID STIM HORMONE: CPT | Performed by: NURSE PRACTITIONER

## 2023-05-22 PROCEDURE — 80061 LIPID PANEL: CPT | Performed by: NURSE PRACTITIONER

## 2023-05-22 PROCEDURE — 82570 ASSAY OF URINE CREATININE: CPT | Performed by: NURSE PRACTITIONER

## 2023-05-22 PROCEDURE — 36415 COLL VENOUS BLD VENIPUNCTURE: CPT | Performed by: NURSE PRACTITIONER

## 2023-05-22 PROCEDURE — 84439 ASSAY OF FREE THYROXINE: CPT | Performed by: NURSE PRACTITIONER

## 2023-05-22 PROCEDURE — 84460 ALANINE AMINO (ALT) (SGPT): CPT | Performed by: NURSE PRACTITIONER

## 2023-05-22 PROCEDURE — 0134A COVID-19 BIVALENT 18+ (MODERNA): CPT | Performed by: NURSE PRACTITIONER

## 2023-05-22 PROCEDURE — 91313 COVID-19 BIVALENT 18+ (MODERNA): CPT | Performed by: NURSE PRACTITIONER

## 2023-05-22 PROCEDURE — 85027 COMPLETE CBC AUTOMATED: CPT | Performed by: NURSE PRACTITIONER

## 2023-05-22 RX ORDER — LISINOPRIL AND HYDROCHLOROTHIAZIDE 20; 25 MG/1; MG/1
TABLET ORAL
Qty: 90 TABLET | Refills: 3 | Status: SHIPPED | OUTPATIENT
Start: 2023-05-22 | End: 2023-11-02

## 2023-05-22 RX ORDER — ATORVASTATIN CALCIUM 40 MG/1
TABLET, FILM COATED ORAL
Qty: 90 TABLET | Refills: 3 | Status: SHIPPED | OUTPATIENT
Start: 2023-05-22 | End: 2024-06-24

## 2023-05-22 ASSESSMENT — PAIN SCALES - GENERAL: PAINLEVEL: NO PAIN (0)

## 2023-05-22 NOTE — PROGRESS NOTES
Assessment & Plan   Problem List Items Addressed This Visit        Endocrine    Acquired hypothyroidism     Repeat TSH today   TSH is consistent with hypothyroidism.  We will plan to initiate levothyroxine 25 mcg daily.  Repeat TSH in 6 weeks.         HLD (hyperlipidemia)     Lipid profile today  Continue statin          Relevant Medications    atorvastatin (LIPITOR) 40 MG tablet    Other Relevant Orders    Lipid panel reflex to direct LDL Fasting (Completed)    ALT (Completed)       Circulatory    HTN (hypertension)     Not at goal but he has been out of his HTN medication for two weeks  - Resume lisinopril-hydrochlorothiazide   - Follow up BP check in 3 weeks  - 6 month office visit follow up          Relevant Medications    lisinopril-hydrochlorothiazide (ZESTORETIC) 20-25 MG tablet    Other Relevant Orders    Basic metabolic panel (Completed)    Thoracoabdominal aortic aneurysm (TAAA) without rupture, 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 Stability noted on 2022 scan.          Relevant Orders    CTA Chest Abdomen with Contrast       Urinary    Chronic kidney disease, stage 3 (H)     Repeat BMP today          Relevant Orders    CBC with platelets (Completed)    Albumin Random Urine Quantitative with Creat Ratio (Completed)       Hematologic    Elevated hemoglobin (H)- see note 1/2021     Repeat hemoglobin today                  BMI:   Estimated body mass index is 31.48 kg/m  as calculated from the following:    Height as of this encounter: 1.524 m (5').    Weight as of this encounter: 73.1 kg (161 lb 3.2 oz).       Allison Belle NP  [facility] JESSE Shaw is a 88 year old, presenting for the following health issues:  Leg Swelling and Refill Request (Has been out of medications about 2weeks)        5/22/2023    10:49 AM   Additional Questions   Roomed by Marsha   Accompanied by NATI     History of Present Illness       Reason for visit:  Swelling in  legs  Symptom onset:  1-2 weeks ago  Symptom intensity:  Moderate  Symptom progression:  Staying the same  Had these symptoms before:  No    He eats 2-3 servings of fruits and vegetables daily.He consumes 0 sweetened beverage(s) daily.He exercises with enough effort to increase his heart rate 10 to 19 minutes per day.  He exercises with enough effort to increase his heart rate 3 or less days per week.   He is taking medications regularly.     He reports that he has had some swelling in the legs which he describes as a tightness which has been noticeable for about a week. He was concerned about his kidney function. He does not check his weight at home because he does not have a scale but it is noted that his weight is 14 lb higher today than it was 6 months ago. He denies his clothing fitting differently over the past 6 months. No shortness of breath.     HTN- he has been out of his blood pressure medication for the past 2 weeks. He denies lightheadedness or dizziness when he takes the medications.            Objective    BP (!) 140/98   Pulse 80   Temp 98.4  F (36.9  C) (Oral)   Resp 24   Ht 1.524 m (5')   Wt 73.1 kg (161 lb 3.2 oz)   SpO2 98%   BMI 31.48 kg/m    Body mass index is 31.48 kg/m .     Wt Readings from Last 5 Encounters:   05/22/23 73.1 kg (161 lb 3.2 oz)   12/07/22 66.8 kg (147 lb 4.8 oz)   09/26/22 70.8 kg (156 lb)   05/06/22 70.5 kg (155 lb 8 oz)   09/21/21 70.3 kg (155 lb)       Physical Exam   GENERAL: healthy, alert and no distress  RESP: lungs clear to auscultation - no rales, rhonchi or wheezes  CV: regular rate and rhythm, normal S1 S2, no S3 or S4, no murmur. Trace b/l LE edema, slightly more prominent on the left

## 2023-05-22 NOTE — ASSESSMENT & PLAN NOTE
Repeat TSH today   TSH is consistent with hypothyroidism.  We will plan to initiate levothyroxine 25 mcg daily.  Repeat TSH in 6 weeks.

## 2023-05-22 NOTE — ASSESSMENT & PLAN NOTE
Not at goal but he has been out of his HTN medication for two weeks  - Resume lisinopril-hydrochlorothiazide   - Follow up BP check in 3 weeks  - 6 month office visit follow up    Symptoms: shortness of breath, right shoulder pain    Onset: 2 months had shortness of breath intermittently.    Location / description: ongoing shortness of breath.  Right shoulder pain for about a month.  \"I work out.  Maybe did something when lifting weights.\"  Has pain at times when moves arms  Precipitating Factors: last month was seen in ER - everything was within normal limits.   Pain Scale (1-10), 10 highest: 3/10  Associated Symptoms: none  What  improves / worsens symptoms: rest/some movement  Symptom specific medications: ice pack used at times  Recent visits (last 3-4 weeks) for same reason or recent surgery:  none  Did the patient have a positive coronavirus screening?: No  If yes, date of Covid-19 exposure:  Not applicable.     PLAN:  See Provider within 2 weeks - appointment scheduled for 8/26.    Patient/Caller agrees to follow recommendations.    Reason for Disposition  • [1] MILD pain (e.g., does not interfere with normal activities) AND [2] present > 7 days    Protocols used: SHOULDER PAIN-A-AH

## 2023-05-22 NOTE — LETTER
May 23, 2023      Colin Car  [address]   [place]  [place] [address]        Dear ,    We are writing to inform you of your test results.    Cholesterol tests look stable.  The LDL is slightly elevated, however, you are very close to the goal range so I do not suggest that we make a change with medication.    Kidney labs show chronic kidney disease which is stable.    You should have received a phone call from the office regarding low thyroid levels.  We will plan to start you on a new medication called levothyroxine for this and then we need to retest your thyroid level in about 6 weeks.    Resulted Orders   Lipid panel reflex to direct LDL Fasting   Result Value Ref Range    Cholesterol 192 <200 mg/dL    Triglycerides 229 (H) <150 mg/dL    Direct Measure HDL 43 >=40 mg/dL    LDL Cholesterol Calculated 103 (H) <=100 mg/dL    Non HDL Cholesterol 149 (H) <130 mg/dL    Narrative    Cholesterol  Desirable:  <200 mg/dL    Triglycerides  Normal:  Less than 150 mg/dL  Borderline High:  150-199 mg/dL  High:  200-499 mg/dL  Very High:  Greater than or equal to 500 mg/dL    Direct Measure HDL  Female:  Greater than or equal to 50 mg/dL   Male:  Greater than or equal to 40 mg/dL    LDL Cholesterol  Desirable:  <100mg/dL  Above Desirable:  100-129 mg/dL   Borderline High:  130-159 mg/dL   High:  160-189 mg/dL   Very High:  >= 190 mg/dL    Non HDL Cholesterol  Desirable:  130 mg/dL  Above Desirable:  130-159 mg/dL  Borderline High:  160-189 mg/dL  High:  190-219 mg/dL  Very High:  Greater than or equal to 220 mg/dL   Basic metabolic panel   Result Value Ref Range    Sodium 139 136 - 145 mmol/L    Potassium 4.2 3.4 - 5.3 mmol/L    Chloride 103 98 - 107 mmol/L    Carbon Dioxide (CO2) 23 22 - 29 mmol/L    Anion Gap 13 7 - 15 mmol/L    Urea Nitrogen 25.6 (H) 8.0 - 23.0 mg/dL    Creatinine 1.32 (H) 0.67 - 1.17 mg/dL    Calcium 9.7 8.8 - 10.2 mg/dL    Glucose 103 (H) 70 - 99 mg/dL    GFR Estimate 53 (L) >60  mL/min/1.73m2      Comment:      eGFR calculated using 2021 CKD-EPI equation.   CBC with platelets   Result Value Ref Range    WBC Count 5.8 4.0 - 11.0 10e3/uL    RBC Count 5.47 4.40 - 5.90 10e6/uL    Hemoglobin 17.5 13.3 - 17.7 g/dL    Hematocrit 51.6 40.0 - 53.0 %    MCV 94 78 - 100 fL    MCH 32.0 26.5 - 33.0 pg    MCHC 33.9 31.5 - 36.5 g/dL    RDW 12.8 10.0 - 15.0 %    Platelet Count 134 (L) 150 - 450 10e3/uL   ALT   Result Value Ref Range    ALT 14 10 - 50 U/L   TSH with free T4 reflex   Result Value Ref Range    TSH 11.60 (H) 0.30 - 4.20 uIU/mL   Albumin Random Urine Quantitative with Creat Ratio   Result Value Ref Range    Creatinine Urine mg/dL 79.7 mg/dL      Comment:      The reference ranges have not been established in urine creatinine. The results should be integrated into the clinical context for interpretation.    Albumin Urine mg/L 166.0 mg/L      Comment:      The reference ranges have not been established in urine albumin. The results should be integrated into the clinical context for interpretation.    Albumin Urine mg/g Cr 208.28 (H) 0.00 - 17.00 mg/g Cr      Comment:      Microalbuminuria is defined as an albumin:creatinine ratio of 17 to 299 for males and 25 to 299 for females. A ratio of albumin:creatinine of 300 or higher is indicative of overt proteinuria.  Due to biologic variability, positive results should be confirmed by a second, first-morning random or 24-hour timed urine specimen. If there is discrepancy, a third specimen is recommended. When 2 out of 3 results are in the microalbuminuria range, this is evidence for incipient nephropathy and warrants increased efforts at glucose control, blood pressure control, and institution of therapy with an angiotensin-converting-enzyme (ACE) inhibitor (if the patient can tolerate it).     T4 free   Result Value Ref Range    Free T4 0.74 (L) 0.90 - 1.70 ng/dL       If you have any questions or concerns, please call the clinic at the number listed  above.       Sincerely,      Allison Belle NP

## 2023-05-23 DIAGNOSIS — E03.9 ACQUIRED HYPOTHYROIDISM: Primary | ICD-10-CM

## 2023-05-23 RX ORDER — LEVOTHYROXINE SODIUM 25 UG/1
25 TABLET ORAL DAILY
Qty: 90 TABLET | Refills: 0 | Status: SHIPPED | OUTPATIENT
Start: 2023-05-23 | End: 2023-08-24

## 2023-05-24 ENCOUNTER — APPOINTMENT (OUTPATIENT)
Dept: INTERPRETER SERVICES | Facility: CLINIC | Age: 88
End: 2023-05-24
Payer: COMMERCIAL

## 2023-05-24 ENCOUNTER — TELEPHONE (OUTPATIENT)
Dept: INTERNAL MEDICINE | Facility: CLINIC | Age: 88
End: 2023-05-24
Payer: COMMERCIAL

## 2023-05-24 NOTE — TELEPHONE ENCOUNTER
----- Message from Allison Belle NP sent at 5/23/2023  4:50 PM CDT -----  Call patient with : His thyroid lab test indicates that he needs to be on a thyroid replacement medication.  I am going to have him start levothyroxine 25 mg daily.  This should be taken on an empty stomach with a full glass of water.  He should wait 45 minutes before eating.  We should then recheck his thyroid level in 6 weeks.  Please schedule a lab appointment.    I will plan to send a letter with the rest of his lab results which are stable.

## 2023-05-24 NOTE — TELEPHONE ENCOUNTER
Left message with . If patient calls back please relay message below and assist with scheduling a lab appointment.

## 2023-06-15 ENCOUNTER — ALLIED HEALTH/NURSE VISIT (OUTPATIENT)
Dept: FAMILY MEDICINE | Facility: CLINIC | Age: 88
End: 2023-06-15
Payer: COMMERCIAL

## 2023-06-15 VITALS — SYSTOLIC BLOOD PRESSURE: 132 MMHG | DIASTOLIC BLOOD PRESSURE: 88 MMHG

## 2023-06-15 DIAGNOSIS — I10 PRIMARY HYPERTENSION: Primary | ICD-10-CM

## 2023-06-15 PROCEDURE — 99207 PR NO CHARGE NURSE ONLY: CPT

## 2023-06-15 NOTE — PROGRESS NOTES
I met with Colin Car at the request of Allison Belle DNP  to recheck his blood pressure.  Blood pressure medications on the med list were reviewed with patient.    Patient has taken all medications as per usual regimen: Yes  Patient reports tolerating them without any issues or concerns: Yes    Vitals:    06/15/23 1118 06/15/23 1126   BP: (!) 138/100 132/88   BP Location: Right arm Right arm   Patient Position: Sitting Sitting   Cuff Size: Adult Regular Adult Regular       After 5 minutes, the patient's blood pressure was <140/90, the previous encounter was reviewed, recorded blood pressure below 140/90. Patient was discharged and the note will be sent to the provider for final review.      Lorenzo Johnson Jr., CMA on 6/15/2023 at 11:29 AM

## 2023-06-22 NOTE — PROGRESS NOTES
Let patient know blood pressure has improved with the recent medication change so we will continue with this regimen for blood pressure and I should see him back in November as scheduled.

## 2023-07-06 ENCOUNTER — PATIENT OUTREACH (OUTPATIENT)
Dept: GERIATRIC MEDICINE | Facility: CLINIC | Age: 88
End: 2023-07-06
Payer: COMMERCIAL

## 2023-07-06 NOTE — PROGRESS NOTES
[facility] Care Coordination Contact    Called adult son Robbie to schedule annual HRA home visit. HRA has been scheduled for 7/13/23.     Shazia Quezada RN, BSN, PHN  [facility]  Phone: [phone]

## 2023-07-14 ENCOUNTER — PATIENT OUTREACH (OUTPATIENT)
Dept: GERIATRIC MEDICINE | Facility: CLINIC | Age: 88
End: 2023-07-14
Payer: COMMERCIAL

## 2023-07-14 ASSESSMENT — ACTIVITIES OF DAILY LIVING (ADL)
DEPENDENT_IADLS:: CLEANING;COOKING;LAUNDRY;SHOPPING;MEAL PREPARATION;MEDICATION MANAGEMENT;MONEY MANAGEMENT;TRANSPORTATION

## 2023-07-18 ENCOUNTER — PATIENT OUTREACH (OUTPATIENT)
Dept: GERIATRIC MEDICINE | Facility: CLINIC | Age: 88
End: 2023-07-18
Payer: COMMERCIAL

## 2023-07-18 DIAGNOSIS — H54.7 UNSPECIFIED VISUAL LOSS: Primary | ICD-10-CM

## 2023-07-18 DIAGNOSIS — M54.50 CHRONIC LOW BACK PAIN, UNSPECIFIED BACK PAIN LATERALITY, UNSPECIFIED WHETHER SCIATICA PRESENT: ICD-10-CM

## 2023-07-18 DIAGNOSIS — G89.29 CHRONIC LOW BACK PAIN, UNSPECIFIED BACK PAIN LATERALITY, UNSPECIFIED WHETHER SCIATICA PRESENT: ICD-10-CM

## 2023-07-18 NOTE — PROGRESS NOTES
DME supply(s) have been requested by the patient. DME orders(s) have been queued up and pended for the provider to review. Patient reports the need for DME supplies due to risk for falls. His previous bath bench broke. Once completed, please route the encounter to care coordinator, Shazia Quezada RN, PHN to fax completed documentation and order requisition to [facility].    Shazia Quezada RN, BSN, PHN  [facility]  Phone: [phone]

## 2023-07-18 NOTE — PROGRESS NOTES
[facility] Care Coordination Contact    [facility] Home Visit Assessment     Home visit for Health Risk Assessment with Colin Car completed on July 13, 2023    Type of residence:: Private home - stairs  Current living arrangement:: I live in a private home with family     Assessment completed with:: Patient, Care Team Member, Children    Current Care Plan  Member currently receiving the following home care services:     Member currently receiving the following community resources: PCA, Day Care      Medication Review  Medication reconciliation completed in Epic: Yes  Medication set-up & administration: Family/informal caregiver sets up weekly.  Self-administers medications.  Medication Risk Assessment Medication (1 or more, place referral to MTM): N/A: No risk factors identified  MTM Referral Placed: No: No risk factors idenified    Mental/Behavioral Health   Depression Screening:   PHQ-2 Total Score (Adult) - Positive if 3 or more points; Administer PHQ-9 if positive: 0       Mental health DX:: No        Falls Assessment:   Fallen 2 or more times in the past year?: No   Any fall with injury in the past year?: No    ADL/IADL Dependencies:   Dependent ADLs:: Ambulation-cane, Bathing, Dressing, Grooming, Toileting  Dependent IADLs:: Cleaning, Cooking, Laundry, Shopping, Meal Preparation, Medication Management, Money Management, Transportation    [facility] Plan sponsored benefits: Shared information re: Silver Sneakers/gym memberships, ASA, Calcium +D.    PCA Assessment completed at visit: Yes Annual PCA assessment indicated 3.5 hours per day of PCA. This is an increase from the previous assessment.     Elderly Waiver Eligibility: Yes-will continue on EW    Care Plan & Recommendations: Colin Blake will continue to attend adult day care 5 days a week. This year, due to Colin Blake's shortness of breath and weakness with exertion going up and down the stairs in the home, he did qualify for support with  mobility. Family is also requesting for a new bath bench due to his previous one is now broken. CC will place order and fax to [facility] once signed by PCP.     See [facility] for detailed assessment information.    Follow-Up Plan: Member informed of future contact, plan to f/u with member with a 6 month telephone assessment.  Contact information shared with member and family, encouraged member to call with any questions or concerns at any time.    [place] care continuum providers: Please see Snapshot and Care Management Flowsheets for Specific details of care plan.    This CC note routed to PCP, Allison Belle.    Shazia Quezada RN, BSN, PHN  [facility]  Phone: [phone]

## 2023-07-19 ENCOUNTER — PATIENT OUTREACH (OUTPATIENT)
Dept: GERIATRIC MEDICINE | Facility: CLINIC | Age: 88
End: 2023-07-19
Payer: COMMERCIAL

## 2023-07-19 NOTE — PROGRESS NOTES
[facility] Care Coordination Contact    Received after visit chart from care coordinator.  Completed following tasks: Mailed copy of care plan to client, Mailed Safe Medication Disposal , Mailed [employer] Leave Behind Letter, Submitted referrals/auths for adult day care, Uploaded consent to communicate form(s) to Epic and Updated services in Database  , Provider Signature - No POC Shared:  Member indicates that they do not want their POC shared with any EW providers.     and [employer]:  Faxed completed PCA assessment to [facility] Agency and mailed copies to member.  Faxed MD Communication to PCP.  Emailed referral form for auth to [employer].    Lena Zuniga  [facility]  Case Management Specialist  [phone]

## 2023-07-19 NOTE — PROGRESS NOTES
CC emailed DME orders to [facility].    Shazia Quezada RN, BSN, PHN  [facility]  Phone: [phone]

## 2023-07-19 NOTE — LETTER
July 19, 2023    Important [employer] Information    BEATRICE LOPEZ MOHINDER  [address]  [place] [address]  Your Care Plan  Dear Beatrice,  When we spoke recently, I promised to send you a Care Plan. The plan enclosed is a summary of our discussion. It includes the steps we agreed would help you meet your health goals. In addition, I can help you with:  [facility]  This program is available to members who need a ride to medical and dental visits. To schedule a ride, call [phone] or [phone] (toll free). TTY: 711. You can call Monday - Thursday 8 a.m. to 5 p.m. and Fridays 9 a.m. to 5 p.m.  One Pass  One Pass is your no-cost, complete, fitness solution for your mind and body. To learn more visit [employer]fitness or call One Pass, toll-free 1 [phone] (TTY: 711) 8 a.m. to 9 p.m. Monday-Friday.  Health Care Directive   This form helps you outline your health care wishes. You can request a form from me and I will answer any questions you have before you discuss it with your doctor.   Annual Physical  Take a key step on your path to good health and set up an annual physical at your clinic.  Questions?  Call me at [phone] Monday-Friday between 8am and 5pm.  TTY: 711. As we discussed, I plan to be in touch with you again in 6 months to follow up via phone.  Sincerely,      Shazia Quezada RN, PHN  [phone]  [email or web address]      [employer]    cc: member records

## 2023-07-31 ENCOUNTER — PATIENT OUTREACH (OUTPATIENT)
Dept: GERIATRIC MEDICINE | Facility: CLINIC | Age: 88
End: 2023-07-31
Payer: COMMERCIAL

## 2023-07-31 NOTE — PROGRESS NOTES
[facility] Care Coordination Contact     Submitted auth to [employer] for wipes. Emailed copy to [facility].     Lena Zuniga  [facility]  Case Management Specialist  [phone]

## 2023-08-16 NOTE — PROGRESS NOTES
Per [facility], wipes delivered 08/09/23. CC notified.    Maru Moscoso  Care Management Specialist Manager  [facility]  [phone]

## 2023-08-17 ENCOUNTER — PATIENT OUTREACH (OUTPATIENT)
Dept: GERIATRIC MEDICINE | Facility: CLINIC | Age: 88
End: 2023-08-17
Payer: COMMERCIAL

## 2023-08-17 NOTE — PROGRESS NOTES
[facility] Care Coordination Contact    Per CC, Completed following tasks: Submitted referrals/auths for ADC and Updated services in Database  and Member Signature - POC Change:  Per CC, member has made a change to their POC.  Care Plan Change Letter mailed to member for signature with a self-addressed return envelope.   and Provider Signature - No POC Shared:  Member indicates that they do not want their POC shared with any EW providers.     Cheryl Torres  Case Management Specialist   [facility]  [phone]

## 2023-08-22 ENCOUNTER — TELEPHONE (OUTPATIENT)
Dept: INTERNAL MEDICINE | Facility: CLINIC | Age: 88
End: 2023-08-22
Payer: COMMERCIAL

## 2023-08-22 DIAGNOSIS — E03.9 ACQUIRED HYPOTHYROIDISM: ICD-10-CM

## 2023-08-23 NOTE — TELEPHONE ENCOUNTER
"Routing refill request to provider for review/approval because:  Labs out of range:  TSH    Last Written Prescription Date:  05/23/2023  Last Fill Quantity: 90,  # refills: 0   Last office visit provider:  05/22/2023     Requested Prescriptions   Pending Prescriptions Disp Refills    levothyroxine (SYNTHROID/LEVOTHROID) 25 MCG tablet [Pharmacy Med Name: LEVOTHYROXINE SODIUM 25 MCG 25 Tablet] 90 tablet 0     Sig: TAKE 1 PILL BY MOUTH EVERY DAY/ NOJ IB LUB IB HNUB PAB AVELINO LUB THYROID       Thyroid Protocol Failed - 8/22/2023  7:45 PM        Failed - Normal TSH on file in past 12 months     Recent Labs   Lab Test 05/22/23  1153   TSH 11.60*              Passed - Patient is 12 years or older        Passed - Recent (12 mo) or future (30 days) visit within the authorizing provider's specialty     Patient has had an office visit with the authorizing provider or a provider within the authorizing providers department within the previous 12 mos or has a future within next 30 days. See \"Patient Info\" tab in inbasket, or \"Choose Columns\" in Meds & Orders section of the refill encounter.              Passed - Medication is active on med list             Ritu Rosario RN 08/22/23 7:46 PM  "

## 2023-08-24 RX ORDER — LEVOTHYROXINE SODIUM 25 UG/1
TABLET ORAL
Qty: 90 TABLET | Refills: 0 | Status: ON HOLD | OUTPATIENT
Start: 2023-08-24 | End: 2023-11-04

## 2023-08-28 ENCOUNTER — PATIENT OUTREACH (OUTPATIENT)
Dept: CARE COORDINATION | Facility: CLINIC | Age: 88
End: 2023-08-28
Payer: COMMERCIAL

## 2023-09-11 ENCOUNTER — PATIENT OUTREACH (OUTPATIENT)
Dept: CARE COORDINATION | Facility: CLINIC | Age: 88
End: 2023-09-11
Payer: COMMERCIAL

## 2023-10-24 NOTE — PROGRESS NOTES
Spoke w/ caregiver, aPt, from group home and informed her that pt would be staying here overnight. States she will call back in the AM for another update.   [facility] Care Coordination Contact    No Letter Received: 60 day tracking of letter complete, no letter received from member. Tracking discontinued.    Deja Shankar  Care Management Specialist   [facility]   [phone]

## 2023-10-24 NOTE — PROGRESS NOTES
[facility] Care Coordination Contact    2nd Attempt: Signed Letter not received from member, resent per process.    Late entry from 9/19/2023    Amanda Dong  Care Management Specialist  [facility]  [phone]

## 2023-11-01 ENCOUNTER — HOSPITAL ENCOUNTER (INPATIENT)
Facility: HOSPITAL | Age: 88
LOS: 2 days | Discharge: HOME | DRG: 871 | End: 2023-11-04
Attending: EMERGENCY MEDICINE | Admitting: FAMILY MEDICINE
Payer: COMMERCIAL

## 2023-11-01 ENCOUNTER — APPOINTMENT (OUTPATIENT)
Dept: RADIOLOGY | Facility: HOSPITAL | Age: 88
DRG: 871 | End: 2023-11-01
Attending: EMERGENCY MEDICINE
Payer: COMMERCIAL

## 2023-11-01 DIAGNOSIS — E03.9 ACQUIRED HYPOTHYROIDISM: ICD-10-CM

## 2023-11-01 DIAGNOSIS — E03.9 HYPOTHYROIDISM, UNSPECIFIED TYPE: ICD-10-CM

## 2023-11-01 DIAGNOSIS — J45.909 REACTIVE AIRWAY DISEASE WITHOUT COMPLICATION, UNSPECIFIED ASTHMA SEVERITY, UNSPECIFIED WHETHER PERSISTENT: Primary | ICD-10-CM

## 2023-11-01 DIAGNOSIS — R53.1 GENERALIZED WEAKNESS: ICD-10-CM

## 2023-11-01 DIAGNOSIS — J18.9 PNEUMONIA OF RIGHT UPPER LOBE DUE TO INFECTIOUS ORGANISM: ICD-10-CM

## 2023-11-01 LAB
ALBUMIN SERPL BCG-MCNC: 4.1 G/DL (ref 3.5–5.2)
ALBUMIN UR-MCNC: 100 MG/DL
ALP SERPL-CCNC: 64 U/L (ref 40–129)
ALT SERPL W P-5'-P-CCNC: 20 U/L (ref 0–70)
ANION GAP SERPL CALCULATED.3IONS-SCNC: 12 MMOL/L (ref 7–15)
APPEARANCE UR: CLEAR
AST SERPL W P-5'-P-CCNC: 44 U/L (ref 0–45)
BACTERIA #/AREA URNS HPF: ABNORMAL /HPF
BASOPHILS # BLD AUTO: 0.1 10E3/UL (ref 0–0.2)
BASOPHILS NFR BLD AUTO: 0 %
BILIRUB SERPL-MCNC: 1.3 MG/DL
BILIRUB UR QL STRIP: NEGATIVE
BUN SERPL-MCNC: 20.5 MG/DL (ref 8–23)
CALCIUM SERPL-MCNC: 9.4 MG/DL (ref 8.8–10.2)
CHLORIDE SERPL-SCNC: 103 MMOL/L (ref 98–107)
COLOR UR AUTO: ABNORMAL
CREAT SERPL-MCNC: 1.3 MG/DL (ref 0.67–1.17)
DEPRECATED HCO3 PLAS-SCNC: 23 MMOL/L (ref 22–29)
EGFRCR SERPLBLD CKD-EPI 2021: 54 ML/MIN/1.73M2
EOSINOPHIL # BLD AUTO: 0 10E3/UL (ref 0–0.7)
EOSINOPHIL NFR BLD AUTO: 0 %
ERYTHROCYTE [DISTWIDTH] IN BLOOD BY AUTOMATED COUNT: 12.7 % (ref 10–15)
FLUAV RNA SPEC QL NAA+PROBE: NEGATIVE
FLUBV RNA RESP QL NAA+PROBE: NEGATIVE
GLUCOSE SERPL-MCNC: 157 MG/DL (ref 70–99)
GLUCOSE UR STRIP-MCNC: NEGATIVE MG/DL
HCT VFR BLD AUTO: 53.2 % (ref 40–53)
HGB BLD-MCNC: 17.7 G/DL (ref 13.3–17.7)
HGB UR QL STRIP: ABNORMAL
IMM GRANULOCYTES # BLD: 0 10E3/UL
IMM GRANULOCYTES NFR BLD: 0 %
KETONES UR STRIP-MCNC: NEGATIVE MG/DL
LACTATE SERPL-SCNC: 1.9 MMOL/L (ref 0.7–2)
LEUKOCYTE ESTERASE UR QL STRIP: NEGATIVE
LYMPHOCYTES # BLD AUTO: 2.2 10E3/UL (ref 0.8–5.3)
LYMPHOCYTES NFR BLD AUTO: 19 %
MCH RBC QN AUTO: 31.9 PG (ref 26.5–33)
MCHC RBC AUTO-ENTMCNC: 33.3 G/DL (ref 31.5–36.5)
MCV RBC AUTO: 96 FL (ref 78–100)
MONOCYTES # BLD AUTO: 1.2 10E3/UL (ref 0–1.3)
MONOCYTES NFR BLD AUTO: 11 %
MUCOUS THREADS #/AREA URNS LPF: PRESENT /LPF
NEUTROPHILS # BLD AUTO: 8.2 10E3/UL (ref 1.6–8.3)
NEUTROPHILS NFR BLD AUTO: 70 %
NITRATE UR QL: NEGATIVE
NRBC # BLD AUTO: 0 10E3/UL
NRBC BLD AUTO-RTO: 0 /100
NT-PROBNP SERPL-MCNC: 750 PG/ML (ref 0–1800)
PH UR STRIP: 6 [PH] (ref 5–7)
PLATELET # BLD AUTO: 147 10E3/UL (ref 150–450)
POTASSIUM SERPL-SCNC: 4.4 MMOL/L (ref 3.4–5.3)
PROCALCITONIN SERPL IA-MCNC: 0.46 NG/ML
PROT SERPL-MCNC: 7.9 G/DL (ref 6.4–8.3)
RBC # BLD AUTO: 5.55 10E6/UL (ref 4.4–5.9)
RBC URINE: 1 /HPF
RSV RNA SPEC NAA+PROBE: NEGATIVE
SARS-COV-2 RNA RESP QL NAA+PROBE: NEGATIVE
SODIUM SERPL-SCNC: 138 MMOL/L (ref 135–145)
SP GR UR STRIP: 1.02 (ref 1–1.03)
T4 FREE SERPL-MCNC: 0.73 NG/DL (ref 0.9–1.7)
TROPONIN T SERPL HS-MCNC: 54 NG/L
TSH SERPL DL<=0.005 MIU/L-ACNC: 6.56 UIU/ML (ref 0.3–4.2)
UROBILINOGEN UR STRIP-MCNC: <2 MG/DL
WBC # BLD AUTO: 11.7 10E3/UL (ref 4–11)
WBC URINE: <1 /HPF

## 2023-11-01 PROCEDURE — 84439 ASSAY OF FREE THYROXINE: CPT | Performed by: EMERGENCY MEDICINE

## 2023-11-01 PROCEDURE — 81001 URINALYSIS AUTO W/SCOPE: CPT | Performed by: EMERGENCY MEDICINE

## 2023-11-01 PROCEDURE — 84484 ASSAY OF TROPONIN QUANT: CPT | Performed by: EMERGENCY MEDICINE

## 2023-11-01 PROCEDURE — 87637 SARSCOV2&INF A&B&RSV AMP PRB: CPT | Performed by: EMERGENCY MEDICINE

## 2023-11-01 PROCEDURE — 85025 COMPLETE CBC W/AUTO DIFF WBC: CPT | Performed by: STUDENT IN AN ORGANIZED HEALTH CARE EDUCATION/TRAINING PROGRAM

## 2023-11-01 PROCEDURE — 85025 COMPLETE CBC W/AUTO DIFF WBC: CPT | Performed by: EMERGENCY MEDICINE

## 2023-11-01 PROCEDURE — 36415 COLL VENOUS BLD VENIPUNCTURE: CPT | Performed by: STUDENT IN AN ORGANIZED HEALTH CARE EDUCATION/TRAINING PROGRAM

## 2023-11-01 PROCEDURE — 71046 X-RAY EXAM CHEST 2 VIEWS: CPT

## 2023-11-01 PROCEDURE — 80053 COMPREHEN METABOLIC PANEL: CPT | Performed by: STUDENT IN AN ORGANIZED HEALTH CARE EDUCATION/TRAINING PROGRAM

## 2023-11-01 PROCEDURE — 99285 EMERGENCY DEPT VISIT HI MDM: CPT | Mod: 25

## 2023-11-01 PROCEDURE — 87040 BLOOD CULTURE FOR BACTERIA: CPT | Performed by: EMERGENCY MEDICINE

## 2023-11-01 PROCEDURE — 83880 ASSAY OF NATRIURETIC PEPTIDE: CPT | Performed by: EMERGENCY MEDICINE

## 2023-11-01 PROCEDURE — 84145 PROCALCITONIN (PCT): CPT | Performed by: EMERGENCY MEDICINE

## 2023-11-01 PROCEDURE — 93005 ELECTROCARDIOGRAM TRACING: CPT | Performed by: EMERGENCY MEDICINE

## 2023-11-01 PROCEDURE — 83605 ASSAY OF LACTIC ACID: CPT | Performed by: EMERGENCY MEDICINE

## 2023-11-01 PROCEDURE — 84443 ASSAY THYROID STIM HORMONE: CPT | Performed by: EMERGENCY MEDICINE

## 2023-11-01 PROCEDURE — 80053 COMPREHEN METABOLIC PANEL: CPT | Performed by: EMERGENCY MEDICINE

## 2023-11-01 PROCEDURE — 36415 COLL VENOUS BLD VENIPUNCTURE: CPT | Performed by: EMERGENCY MEDICINE

## 2023-11-01 PROCEDURE — 83605 ASSAY OF LACTIC ACID: CPT | Performed by: STUDENT IN AN ORGANIZED HEALTH CARE EDUCATION/TRAINING PROGRAM

## 2023-11-01 RX ORDER — CEFTRIAXONE 1 G/1
1 INJECTION, POWDER, FOR SOLUTION INTRAMUSCULAR; INTRAVENOUS ONCE
Status: COMPLETED | OUTPATIENT
Start: 2023-11-01 | End: 2023-11-02

## 2023-11-01 ASSESSMENT — ENCOUNTER SYMPTOMS
FATIGUE: 1
VOMITING: 0
NAUSEA: 0
DIARRHEA: 0
COUGH: 0
SHORTNESS OF BREATH: 0
ABDOMINAL PAIN: 0
WEAKNESS: 1

## 2023-11-01 ASSESSMENT — ACTIVITIES OF DAILY LIVING (ADL): ADLS_ACUITY_SCORE: 35

## 2023-11-02 ENCOUNTER — PATIENT OUTREACH (OUTPATIENT)
Dept: GERIATRIC MEDICINE | Facility: CLINIC | Age: 88
End: 2023-11-02

## 2023-11-02 PROBLEM — J18.9 PNEUMONIA OF RIGHT UPPER LOBE DUE TO INFECTIOUS ORGANISM: Status: ACTIVE | Noted: 2023-11-02

## 2023-11-02 PROBLEM — R53.1 GENERALIZED WEAKNESS: Status: ACTIVE | Noted: 2023-11-02

## 2023-11-02 PROBLEM — E03.9 HYPOTHYROIDISM, UNSPECIFIED TYPE: Status: ACTIVE | Noted: 2023-11-02

## 2023-11-02 LAB
ANION GAP SERPL CALCULATED.3IONS-SCNC: 12 MMOL/L (ref 7–15)
ATRIAL RATE - MUSE: 107 BPM
BASOPHILS # BLD AUTO: 0.1 10E3/UL (ref 0–0.2)
BASOPHILS NFR BLD AUTO: 1 %
BUN SERPL-MCNC: 19.8 MG/DL (ref 8–23)
CALCIUM SERPL-MCNC: 9.2 MG/DL (ref 8.8–10.2)
CHLORIDE SERPL-SCNC: 104 MMOL/L (ref 98–107)
CREAT SERPL-MCNC: 1.3 MG/DL (ref 0.67–1.17)
DEPRECATED HCO3 PLAS-SCNC: 23 MMOL/L (ref 22–29)
DIASTOLIC BLOOD PRESSURE - MUSE: NORMAL MMHG
EGFRCR SERPLBLD CKD-EPI 2021: 54 ML/MIN/1.73M2
EOSINOPHIL # BLD AUTO: 0 10E3/UL (ref 0–0.7)
EOSINOPHIL NFR BLD AUTO: 0 %
ERYTHROCYTE [DISTWIDTH] IN BLOOD BY AUTOMATED COUNT: 12.8 % (ref 10–15)
GLUCOSE SERPL-MCNC: 119 MG/DL (ref 70–99)
HCT VFR BLD AUTO: 53.4 % (ref 40–53)
HGB BLD-MCNC: 17.8 G/DL (ref 13.3–17.7)
IMM GRANULOCYTES # BLD: 0.1 10E3/UL
IMM GRANULOCYTES NFR BLD: 1 %
INTERPRETATION ECG - MUSE: NORMAL
LACTATE SERPL-SCNC: 2.6 MMOL/L (ref 0.7–2)
LYMPHOCYTES # BLD AUTO: 3.1 10E3/UL (ref 0.8–5.3)
LYMPHOCYTES NFR BLD AUTO: 25 %
MCH RBC QN AUTO: 32.1 PG (ref 26.5–33)
MCHC RBC AUTO-ENTMCNC: 33.3 G/DL (ref 31.5–36.5)
MCV RBC AUTO: 96 FL (ref 78–100)
MONOCYTES # BLD AUTO: 1.5 10E3/UL (ref 0–1.3)
MONOCYTES NFR BLD AUTO: 12 %
NEUTROPHILS # BLD AUTO: 7.8 10E3/UL (ref 1.6–8.3)
NEUTROPHILS NFR BLD AUTO: 61 %
NRBC # BLD AUTO: 0 10E3/UL
NRBC BLD AUTO-RTO: 0 /100
P AXIS - MUSE: 34 DEGREES
PLATELET # BLD AUTO: 139 10E3/UL (ref 150–450)
POTASSIUM SERPL-SCNC: 3.8 MMOL/L (ref 3.4–5.3)
PR INTERVAL - MUSE: 170 MS
QRS DURATION - MUSE: 92 MS
QT - MUSE: 338 MS
QTC - MUSE: 451 MS
R AXIS - MUSE: 65 DEGREES
RBC # BLD AUTO: 5.55 10E6/UL (ref 4.4–5.9)
SODIUM SERPL-SCNC: 139 MMOL/L (ref 135–145)
SYSTOLIC BLOOD PRESSURE - MUSE: NORMAL MMHG
T AXIS - MUSE: -8 DEGREES
VENTRICULAR RATE- MUSE: 107 BPM
WBC # BLD AUTO: 12.5 10E3/UL (ref 4–11)

## 2023-11-02 PROCEDURE — 80048 BASIC METABOLIC PNL TOTAL CA: CPT | Performed by: FAMILY MEDICINE

## 2023-11-02 PROCEDURE — 999N000157 HC STATISTIC RCP TIME EA 10 MIN

## 2023-11-02 PROCEDURE — 250N000013 HC RX MED GY IP 250 OP 250 PS 637: Performed by: HOSPITALIST

## 2023-11-02 PROCEDURE — 250N000011 HC RX IP 250 OP 636: Mod: JZ | Performed by: EMERGENCY MEDICINE

## 2023-11-02 PROCEDURE — 94640 AIRWAY INHALATION TREATMENT: CPT | Mod: 76

## 2023-11-02 PROCEDURE — 85025 COMPLETE CBC W/AUTO DIFF WBC: CPT | Performed by: FAMILY MEDICINE

## 2023-11-02 PROCEDURE — 250N000011 HC RX IP 250 OP 636: Performed by: FAMILY MEDICINE

## 2023-11-02 PROCEDURE — 36415 COLL VENOUS BLD VENIPUNCTURE: CPT | Performed by: HOSPITALIST

## 2023-11-02 PROCEDURE — 83605 ASSAY OF LACTIC ACID: CPT | Performed by: HOSPITALIST

## 2023-11-02 PROCEDURE — 94640 AIRWAY INHALATION TREATMENT: CPT

## 2023-11-02 PROCEDURE — 272N000202 HC AEROBIKA WITH MANOMETER

## 2023-11-02 PROCEDURE — 258N000003 HC RX IP 258 OP 636: Performed by: FAMILY MEDICINE

## 2023-11-02 PROCEDURE — 250N000013 HC RX MED GY IP 250 OP 250 PS 637: Performed by: FAMILY MEDICINE

## 2023-11-02 PROCEDURE — 120N000001 HC R&B MED SURG/OB

## 2023-11-02 PROCEDURE — 250N000009 HC RX 250: Performed by: FAMILY MEDICINE

## 2023-11-02 PROCEDURE — 99223 1ST HOSP IP/OBS HIGH 75: CPT | Performed by: FAMILY MEDICINE

## 2023-11-02 PROCEDURE — 36415 COLL VENOUS BLD VENIPUNCTURE: CPT | Performed by: FAMILY MEDICINE

## 2023-11-02 RX ORDER — BISACODYL 10 MG
10 SUPPOSITORY, RECTAL RECTAL DAILY PRN
Status: DISCONTINUED | OUTPATIENT
Start: 2023-11-02 | End: 2023-11-04 | Stop reason: HOSPADM

## 2023-11-02 RX ORDER — LISINOPRIL 5 MG/1
10 TABLET ORAL DAILY
Status: DISCONTINUED | OUTPATIENT
Start: 2023-11-02 | End: 2023-11-03

## 2023-11-02 RX ORDER — LANOLIN ALCOHOL/MO/W.PET/CERES
3 CREAM (GRAM) TOPICAL
Status: DISCONTINUED | OUTPATIENT
Start: 2023-11-02 | End: 2023-11-04 | Stop reason: HOSPADM

## 2023-11-02 RX ORDER — ONDANSETRON 2 MG/ML
4 INJECTION INTRAMUSCULAR; INTRAVENOUS EVERY 6 HOURS PRN
Status: DISCONTINUED | OUTPATIENT
Start: 2023-11-02 | End: 2023-11-04 | Stop reason: HOSPADM

## 2023-11-02 RX ORDER — DOCUSATE SODIUM 100 MG/1
100 CAPSULE, LIQUID FILLED ORAL 2 TIMES DAILY PRN
Status: DISCONTINUED | OUTPATIENT
Start: 2023-11-02 | End: 2023-11-04 | Stop reason: HOSPADM

## 2023-11-02 RX ORDER — LISINOPRIL AND HYDROCHLOROTHIAZIDE 20; 25 MG/1; MG/1
1 TABLET ORAL DAILY
COMMUNITY
End: 2024-06-24

## 2023-11-02 RX ORDER — FAMOTIDINE 10 MG
10 TABLET ORAL 2 TIMES DAILY PRN
Status: DISCONTINUED | OUTPATIENT
Start: 2023-11-02 | End: 2023-11-04 | Stop reason: HOSPADM

## 2023-11-02 RX ORDER — CALCIUM CARBONATE 500 MG/1
500 TABLET, CHEWABLE ORAL 3 TIMES DAILY PRN
Status: DISCONTINUED | OUTPATIENT
Start: 2023-11-02 | End: 2023-11-04 | Stop reason: HOSPADM

## 2023-11-02 RX ORDER — IPRATROPIUM BROMIDE AND ALBUTEROL SULFATE 2.5; .5 MG/3ML; MG/3ML
3 SOLUTION RESPIRATORY (INHALATION) 4 TIMES DAILY
Status: DISCONTINUED | OUTPATIENT
Start: 2023-11-02 | End: 2023-11-04 | Stop reason: HOSPADM

## 2023-11-02 RX ORDER — IPRATROPIUM BROMIDE AND ALBUTEROL SULFATE 2.5; .5 MG/3ML; MG/3ML
3 SOLUTION RESPIRATORY (INHALATION) 4 TIMES DAILY
Status: DISCONTINUED | OUTPATIENT
Start: 2023-11-02 | End: 2023-11-02

## 2023-11-02 RX ORDER — CARBOXYMETHYLCELLULOSE SODIUM 5 MG/ML
1 SOLUTION/ DROPS OPHTHALMIC 3 TIMES DAILY PRN
Status: DISCONTINUED | OUTPATIENT
Start: 2023-11-02 | End: 2023-11-04 | Stop reason: HOSPADM

## 2023-11-02 RX ORDER — HYDRALAZINE HYDROCHLORIDE 20 MG/ML
5 INJECTION INTRAMUSCULAR; INTRAVENOUS EVERY 6 HOURS PRN
Status: DISCONTINUED | OUTPATIENT
Start: 2023-11-02 | End: 2023-11-04 | Stop reason: HOSPADM

## 2023-11-02 RX ORDER — IPRATROPIUM BROMIDE AND ALBUTEROL SULFATE 2.5; .5 MG/3ML; MG/3ML
3 SOLUTION RESPIRATORY (INHALATION) ONCE
Status: COMPLETED | OUTPATIENT
Start: 2023-11-02 | End: 2023-11-02

## 2023-11-02 RX ORDER — POLYETHYLENE GLYCOL 3350 17 G/17G
17 POWDER, FOR SOLUTION ORAL DAILY PRN
Status: DISCONTINUED | OUTPATIENT
Start: 2023-11-02 | End: 2023-11-04 | Stop reason: HOSPADM

## 2023-11-02 RX ORDER — ACETAMINOPHEN 325 MG/1
650 TABLET ORAL EVERY 4 HOURS PRN
Status: DISCONTINUED | OUTPATIENT
Start: 2023-11-02 | End: 2023-11-04 | Stop reason: HOSPADM

## 2023-11-02 RX ORDER — CEFTRIAXONE 2 G/1
2 INJECTION, POWDER, FOR SOLUTION INTRAMUSCULAR; INTRAVENOUS EVERY 24 HOURS
Qty: 140 ML | Refills: 0 | Status: DISCONTINUED | OUTPATIENT
Start: 2023-11-02 | End: 2023-11-04 | Stop reason: HOSPADM

## 2023-11-02 RX ADMIN — LISINOPRIL 10 MG: 5 TABLET ORAL at 16:14

## 2023-11-02 RX ADMIN — AZITHROMYCIN MONOHYDRATE 500 MG: 500 INJECTION, POWDER, LYOPHILIZED, FOR SOLUTION INTRAVENOUS at 02:05

## 2023-11-02 RX ADMIN — IPRATROPIUM BROMIDE AND ALBUTEROL SULFATE 3 ML: .5; 3 SOLUTION RESPIRATORY (INHALATION) at 07:20

## 2023-11-02 RX ADMIN — IPRATROPIUM BROMIDE AND ALBUTEROL SULFATE 3 ML: .5; 3 SOLUTION RESPIRATORY (INHALATION) at 11:44

## 2023-11-02 RX ADMIN — CEFTRIAXONE SODIUM 2 G: 2 INJECTION, POWDER, FOR SOLUTION INTRAMUSCULAR; INTRAVENOUS at 22:26

## 2023-11-02 RX ADMIN — ACETAMINOPHEN 650 MG: 325 TABLET ORAL at 16:14

## 2023-11-02 RX ADMIN — IPRATROPIUM BROMIDE AND ALBUTEROL SULFATE 3 ML: .5; 3 SOLUTION RESPIRATORY (INHALATION) at 19:17

## 2023-11-02 RX ADMIN — LEVOTHYROXINE SODIUM 37.5 MCG: 0.03 TABLET ORAL at 09:30

## 2023-11-02 RX ADMIN — CEFTRIAXONE SODIUM 1 G: 1 INJECTION, POWDER, FOR SOLUTION INTRAMUSCULAR; INTRAVENOUS at 00:23

## 2023-11-02 RX ADMIN — AZITHROMYCIN MONOHYDRATE 250 MG: 500 INJECTION, POWDER, LYOPHILIZED, FOR SOLUTION INTRAVENOUS at 23:30

## 2023-11-02 ASSESSMENT — ACTIVITIES OF DAILY LIVING (ADL)
ADLS_ACUITY_SCORE: 39
ADLS_ACUITY_SCORE: 35
ADLS_ACUITY_SCORE: 35
ADLS_ACUITY_SCORE: 39
ADLS_ACUITY_SCORE: 40
ADLS_ACUITY_SCORE: 35
ADLS_ACUITY_SCORE: 40
ADLS_ACUITY_SCORE: 40
ADLS_ACUITY_SCORE: 35
ADLS_ACUITY_SCORE: 40

## 2023-11-02 NOTE — H&P
[facility]    History and Physical - Hospitalist Service       Date of Admission:  11/2/2023    Assessment & Plan      Colin Car is a 88 year old male with PMH significant for HTN, HLD, CKD 3 and hypothyroidism who is admitted on 11/2/2023 with Community Acquired Pneumonia.    # Community Acquired Pneumonia- Admit patient. Continue Rocephin. Add Azithromycin and Duonebs. Supplemental O2. Monitor vitals closely. Repeat labs in a.m.    # Fatigue- Multifactorial. Continue IV abx for CAP.  Increase dosage of Levothyroxine from 25 mcg to 37.5mcg due to elevated TSH and low T4. Follow up thyroid studies in 4-6 weeks.    # Tachycardia- HR improved. Likely 2/2 to CAP. Telemetry monitoring.    # Elevated Troponin- Chest pain free. Possible demand ischemia from tachycardia. EKG reviewed- sinus tachycardia at that time with age indeterminate inferior infarct. No ST depression or elevation appreciated. Will repeat troponin. Recommend Cardiology consult if it increases. Continue telemetry monitoring. Check echocardiogram.    # Leukocytosis- 2/2 CAP. Continue IV abx. Repeat CBC in a.m.    # HTN- BP stable. Review home meds. Monitor vitals closely.    # Hypothyroidism- Increase dosage of Levothyroxine from 25 mcg to 37.5mcg due to elevated TSH and low T4. Follow up thyroid studies in 4-6 weeks.            Diet:  Cardiac  DVT Prophylaxis: Pneumatic Compression Devices  Logan Catheter: Not present  Lines: None     Cardiac Monitoring: None  Code Status:  Full Code per patient.    Clinically Significant Risk Factors Present on Admission                  # Hypertension: Noted on problem list                 Disposition Plan   Anticipate > 2 midnight Inpatient Hospital stay.       Lucinda Kohler MD  Hospitalist Service  [facility]  Securely message with [employer] (more info)  Text page via [facility] Paging/Directory      ______________________________________________________________________    Chief Complaint   Fatigue x 2 days    History is obtained from the patient (via [employer] ), ED Physician and chart review.    History of Present Illness   Colin Car is a 88 year old male with PMH significant for HTN, HLD, CKD 3 and hypothyroidism who presents to ED from home due to fatigue x 2 days. He has no other complaints. On physician examination he has a few expiratory wheezes. CXR consistent with bilateral pneumonia, WBC and procalcitonin elevated. Patient denies fever, chills, chest pain, shortness of breath, coughing, nausea, vomiting, diarrhea or abdominal pain. Patient appears tired, but otherwise in no acute distress.      Past Medical History    Past Medical History:   Diagnosis Date    Hyperlipidemia     Hypertension     Hypothyroidism     Major depressive disorder, recurrent episode, mild (H24)     Created by Conversion        Past Surgical History   Past Surgical History:   Procedure Laterality Date    ENDOSCOPIC RETROGRADE CHOLANGIOPANCREATOGRAM N/A 6/2/2018    Procedure: ENDOSCOPIC RETROGRADE CHOLANGIOPANCREATOGRAPHY; SPHINCTEROTOMY AND STONE EXTRACTION;  Surgeon: Juve Shannon MD;  Location: [facility];  Service:     LAPAROSCOPIC CHOLECYSTECTOMY N/A 6/1/2018    Procedure: CHOLECYSTECTOMY, LAPAROSCOPIC, COMMON DUCT EXPLORATION;  Surgeon: Meir Beck MD;  Location: [facility];  Service:     NO PAST SURGERIES         Prior to Admission Medications   Prior to Admission Medications   Prescriptions Last Dose Informant Patient Reported? Taking?   acetaminophen (TYLENOL EXTRA STRENGTH) 500 MG tablet   No No   Sig: [ACETAMINOPHEN (TYLENOL EXTRA STRENGTH) 500 MG TABLET] Take 1 tablet (500 mg total) by mouth every 4 (four) hours as needed for pain.   atorvastatin (LIPITOR) 40 MG tablet   No No   Sig: TAKE 1 PILL BY MOUTH EVERY DAY/TXFÁTIMAA HNUB NOJ 1 LUB TSHUAJ PAB NTSHAV MUAJ ROJ    calcium carbonate-vitamin D3 (CALTRATE 600 PLUS D3) 600 mg(1,500mg) -400 unit per tablet   No No   Sig: [CALCIUM CARBONATE-VITAMIN D3 (CALTRATE 600 PLUS D3) 600 MG(1,500MG) -400 UNIT PER TABLET] TAKE 1 PILL BY MOUTH EVERY DAY/TXHUA HNUB NOJ 1 LUB TSHUAJ PAB POBTXHA   levothyroxine (SYNTHROID/LEVOTHROID) 25 MCG tablet   No No   Sig: TAKE 1 PILL BY MOUTH EVERY DAY/ NOJ IB LUB IB HNUB PAB AVELINO LUB THYROID   lisinopril-hydrochlorothiazide (ZESTORETIC) 20-25 MG tablet   No No   Sig: TAKE 1 PILL BY MOUTH DAILY FOR BLOOD PRESSURE / TXHUA HNUB NOJ 1 LUB TSHUAJ PAB ZOO AVELINO NTSHAV SIAB      Facility-Administered Medications: None        Review of Systems    The 10 point Review of Systems is negative other than noted in the HPI.    Social History   I have reviewed this patient's social history and updated it with pertinent information if needed.  Social History     Tobacco Use    Smoking status: Never    Smokeless tobacco: Never   Vaping Use    Vaping Use: Never used   Substance Use Topics    Alcohol use: No    Drug use: No         Family History   I have reviewed this patient's family history and updated it with pertinent information if needed.  Family History   Problem Relation Age of Onset    No Known Problems Mother     No Known Problems Father     No Known Problems Brother     No Known Problems Brother         Physical Exam   Vital Signs: Temp: 99.1  F (37.3  C) Temp src: Oral BP: (!) 168/83 Pulse: 96   Resp: (!) 31 SpO2: 93 % O2 Device: None (Room air)    Weight: 0 lbs 0 oz    General Appearance: Lethargic, but easily arousable, NAD  Respiratory: Mild expiratory wheezes  Cardiovascular: Regular rate, S1S2  GI: +BS, soft, NT, ND  Skin: No rash  Other: No pedal edema     Medical Decision Making       55 MINUTES SPENT BY ME on the date of service doing chart review, history, exam, documentation & further activities per the note.      Data     I have personally reviewed the following data over the past 24 hrs:    11.7 (H)   \   17.7   / 147 (L)     138 103 20.5 /  157 (H)   4.4 23 1.30 (H) \     ALT: 20 AST: 44 AP: 64 TBILI: 1.3 (H)   ALB: 4.1 TOT PROTEIN: 7.9 LIPASE: N/A     Trop: 54 (H) BNP: 750     TSH: 6.56 (H) T4: 0.73 (L) A1C: N/A     Procal: 0.46 (H) CRP: N/A Lactic Acid: 1.9         Imaging results reviewed over the past 24 hrs:   Recent Results (from the past 24 hour(s))   Chest XR,  PA & LAT    Narrative    EXAM: XR CHEST 2 VIEWS  LOCATION: [facility]  DATE: 11/1/2023    INDICATION: shortness of breath  COMPARISON: 12/06/2022      Impression    IMPRESSION: Mildly enlarged cardiac silhouette. Patchy airspace infiltrates in the lung bases and right upper lobe concerning for developing pneumonia. No visible pneumothorax or pleural effusion.

## 2023-11-02 NOTE — PLAN OF CARE
[id number]    Settled patient into room. Utilized interpretive services to explain cares to be provided and current orders. Patient SBA/Assist x1. Telemetry ordered for patient oncoming nurse aware. Patient's family in room.      Lactic pending - hot pack and warm blanket on L hand.     Provider notified of patient's oral temperature at 1445. No PRNs currently available.     L PIV SL.

## 2023-11-02 NOTE — ED PROVIDER NOTES
EMERGENCY DEPARTMENT ENCOUNTER      NAME: Colin Car  AGE: 88 year old male  YOB: 1935  MRN: [id number]  EVALUATION DATE & TIME: 2023  9:08 PM    PCP: Allison Belle    ED PROVIDER: Damon Lagunas DO      Chief Complaint   Patient presents with    Generalized Weakness         FINAL IMPRESSION:  1. Pneumonia of right upper lobe due to infectious organism    2. Generalized weakness    3. Hypothyroidism, unspecified type          ED COURSE & MEDICAL DECISION MAKIN-year-old male presented to the ED for evaluation of generalized weakness and fatigue that been ongoing for last 2 days.  Aside from the generalized weakness the patient had no other significant complaints.  The patient was noted to be hypertensive, tachycardic, and tachypneic when he initially arrived to the ED.  The patient appeared to be in mild respiratory distress at the time of his initial evaluation.  On exam he was noted to have 20 tachypnea with conversational dyspnea.  He had slight diminished breath sounds and expiratory wheezing noted in the right upper lobe.  The remainder of his physical exam was unremarkable.    The patient's EKG revealed sinus tachycardia with nonspecific ST and T wave changes.    The patient's white blood cell count was slightly elevated 11.7.  Procalcitonin was also slightly elevated at 0.46.  Chest x-ray shows patchy infiltrate in the right upper lobe which is concerning for pneumonia.  Blood cultures were obtained and the patient was started on IV Rocephin.  The patient was also given a DuoNeb breathing treatment to treat the mild wheezing that was noted.    Additional laboratory test show an elevated troponin of 54.  BNP was unremarkable.    UA did not show evidence urinary tract infection and the COVID testing was negative.  The patient's TSH was increased and the free T4 was slightly low at 0.73.    The patient was informed of the work-up results and the likelihood of pneumonia.  The patient was  admitted to the hospitalist service for further evaluation and treatment.     Pertinent Labs & Imaging studies reviewed. (See chart for details)  10:59 PM I met with the patient to gather history and to perform my initial exam. We discussed plans for the ED course, including diagnostic testing and treatment.   12:07 AM I spoke with Dr. Hinkle, hospitalist.      At the conclusion of the encounter I discussed the results of all of the tests and the disposition. The questions were answered. The patient or family acknowledged understanding and was agreeable with the care plan.     Medical Decision Making    History:  Supplemental history from: N/A  External Record(s) reviewed: Documented in chart, if applicable.    Work Up:  Chart documentation includes differential considered and any EKGs or imaging independently interpreted by provider, where specified.  In additional to work up documented, I considered the following work up: Documented in chart, if applicable.    External consultation:  Discussion of management with another provider: Documented in chart, if applicable    Complicating factors:  Care impacted by chronic illness: Chronic Kidney Disease, Hyperlipidemia, Hypertension, and Other: hypothyroidism  Care affected by social determinants of health: N/A    Disposition considerations: Admit.      PPE worn: n95 mask, goggles    MEDICATIONS GIVEN IN THE EMERGENCY:  Medications   ipratropium - albuterol 0.5 mg/2.5 mg/3 mL (DUONEB) neb solution 3 mL (has no administration in time range)   cefTRIAXone (ROCEPHIN) 1 g vial to attach to  mL bag for ADULTS or NS 50 mL bag for PEDS (1 g Intravenous $New Bag 11/2/23 0023)       NEW PRESCRIPTIONS STARTED AT TODAY'S ER VISIT  New Prescriptions    No medications on file          =================================================================    HPI    Patient information was obtained from: Patient     Use of : Yes (Phone) - Alexandr Car is a  88 year old male with a pertinent history of HTN, HLD, CKD3 and hypothyroidism who presents to this ED by private vehicle for evaluation of fatigue and generalized weakness.     The patient endorses 2 days of fatigue and generalized weakness. He denies shortness of breath, cough, chest pain, abdominal pain, nausea, vomiting, diarrhea, or any other complaints at this time.       REVIEW OF SYSTEMS   Review of Systems   Constitutional:  Positive for fatigue.   Respiratory:  Negative for cough and shortness of breath.    Cardiovascular:  Negative for chest pain.   Gastrointestinal:  Negative for abdominal pain, diarrhea, nausea and vomiting.   Neurological:  Positive for weakness.   All other systems reviewed and are negative.       PAST MEDICAL HISTORY:  Past Medical History:   Diagnosis Date    Hyperlipidemia     Hypertension     Hypothyroidism     Major depressive disorder, recurrent episode, mild (H24)     Created by Conversion        PAST SURGICAL HISTORY:  Past Surgical History:   Procedure Laterality Date    ENDOSCOPIC RETROGRADE CHOLANGIOPANCREATOGRAM N/A 6/2/2018    Procedure: ENDOSCOPIC RETROGRADE CHOLANGIOPANCREATOGRAPHY; SPHINCTEROTOMY AND STONE EXTRACTION;  Surgeon: Juve Shannon MD;  Location: [facility] [place];  Service:     LAPAROSCOPIC CHOLECYSTECTOMY N/A 6/1/2018    Procedure: CHOLECYSTECTOMY, LAPAROSCOPIC, COMMON DUCT EXPLORATION;  Surgeon: Meir Beck MD;  Location: [facility] [place];  Service:     NO PAST SURGERIES             CURRENT MEDICATIONS:    acetaminophen (TYLENOL EXTRA STRENGTH) 500 MG tablet  atorvastatin (LIPITOR) 40 MG tablet  calcium carbonate-vitamin D3 (CALTRATE 600 PLUS D3) 600 mg(1,500mg) -400 unit per tablet  levothyroxine (SYNTHROID/LEVOTHROID) 25 MCG tablet  lisinopril-hydrochlorothiazide (ZESTORETIC) 20-25 MG tablet        ALLERGIES:  No Known Allergies    FAMILY HISTORY:  Family History   Problem Relation Age of Onset    No Known Problems Mother     No Known  Problems Father     No Known Problems Brother     No Known Problems Brother        SOCIAL HISTORY:   Social History     Socioeconomic History    Marital status: Single     Spouse name: None    Number of children: None    Years of education: None    Highest education level: None   Tobacco Use    Smoking status: Never    Smokeless tobacco: Never   Vaping Use    Vaping Use: Never used   Substance and Sexual Activity    Alcohol use: No    Drug use: No   Social History Narrative    He lives with his son, daughter-in-law, and their children.  He is .  He has 3 children total. - Dr. Brown 01/08/21        VITALS:  BP (!) 152/76   Pulse 99   Temp 99.1  F (37.3  C) (Oral)   Resp (!) 31   SpO2 94%     PHYSICAL EXAM    General presentation: Alert, Vital signs reviewed. NAD. Fatigued appearing.   HENT: ENT inspection is normal. Oropharynx is moist and clear.   Eye: Pupils are equal and reactive to light. EOMI  Neck: The neck is supple, with full ROM, with no evidence of meningismus.  Pulmonary: In mild respiratory distress. Tachypneic, increased work of breathing. Faint expiratory wheezing in right upper lobes.  Circulatory: Regular rate and rhythm. Peripheral pulses are strong and equal. No murmurs, rubs, or gallops.   Abdominal: The abdomen is soft. Nontender. No rigidity, guarding, or rebound. Bowel sounds normal.   Neurologic: Alert, oriented to person, place, and time. No motor deficit. No sensory deficit. Cranial nerves II through XII are intact.  Musculoskeletal: No extremity tenderness. Full range of motion in all extremities. No extremity edema.   Skin: Skin color is normal. No rash. Warm. Dry to touch.      LAB:  All pertinent labs reviewed and interpreted.  Results for orders placed or performed during the hospital encounter of 11/01/23   Chest XR,  PA & LAT    Impression    IMPRESSION: Mildly enlarged cardiac silhouette. Patchy airspace infiltrates in the lung bases and right upper lobe concerning for  developing pneumonia. No visible pneumothorax or pleural effusion.   Comprehensive metabolic panel   Result Value Ref Range    Sodium 138 135 - 145 mmol/L    Potassium 4.4 3.4 - 5.3 mmol/L    Carbon Dioxide (CO2) 23 22 - 29 mmol/L    Anion Gap 12 7 - 15 mmol/L    Urea Nitrogen 20.5 8.0 - 23.0 mg/dL    Creatinine 1.30 (H) 0.67 - 1.17 mg/dL    GFR Estimate 54 (L) >60 mL/min/1.73m2    Calcium 9.4 8.8 - 10.2 mg/dL    Chloride 103 98 - 107 mmol/L    Glucose 157 (H) 70 - 99 mg/dL    Alkaline Phosphatase 64 40 - 129 U/L    AST 44 0 - 45 U/L    ALT 20 0 - 70 U/L    Protein Total 7.9 6.4 - 8.3 g/dL    Albumin 4.1 3.5 - 5.2 g/dL    Bilirubin Total 1.3 (H) <=1.2 mg/dL   Result Value Ref Range    Lactic Acid 1.9 0.7 - 2.0 mmol/L   UA with Microscopic reflex to Culture    Specimen: Urine, Midstream   Result Value Ref Range    Color Urine Light Yellow Colorless, Straw, Light Yellow, Yellow    Appearance Urine Clear Clear    Glucose Urine Negative Negative mg/dL    Bilirubin Urine Negative Negative    Ketones Urine Negative Negative mg/dL    Specific Gravity Urine 1.018 1.001 - 1.030    Blood Urine 0.1 mg/dL (A) Negative    pH Urine 6.0 5.0 - 7.0    Protein Albumin Urine 100 (A) Negative mg/dL    Urobilinogen Urine <2.0 <2.0 mg/dL    Nitrite Urine Negative Negative    Leukocyte Esterase Urine Negative Negative    Bacteria Urine None Seen None Seen /HPF    Mucus Urine Present (A) None Seen /LPF    RBC Urine 1 <=2 /HPF    WBC Urine <1 <=5 /HPF   CBC with platelets and differential   Result Value Ref Range    WBC Count 11.7 (H) 4.0 - 11.0 10e3/uL    RBC Count 5.55 4.40 - 5.90 10e6/uL    Hemoglobin 17.7 13.3 - 17.7 g/dL    Hematocrit 53.2 (H) 40.0 - 53.0 %    MCV 96 78 - 100 fL    MCH 31.9 26.5 - 33.0 pg    MCHC 33.3 31.5 - 36.5 g/dL    RDW 12.7 10.0 - 15.0 %    Platelet Count 147 (L) 150 - 450 10e3/uL    % Neutrophils 70 %    % Lymphocytes 19 %    % Monocytes 11 %    % Eosinophils 0 %    % Basophils 0 %    % Immature Granulocytes 0 %     NRBCs per 100 WBC 0 <1 /100    Absolute Neutrophils 8.2 1.6 - 8.3 10e3/uL    Absolute Lymphocytes 2.2 0.8 - 5.3 10e3/uL    Absolute Monocytes 1.2 0.0 - 1.3 10e3/uL    Absolute Eosinophils 0.0 0.0 - 0.7 10e3/uL    Absolute Basophils 0.1 0.0 - 0.2 10e3/uL    Absolute Immature Granulocytes 0.0 <=0.4 10e3/uL    Absolute NRBCs 0.0 10e3/uL   Result Value Ref Range    Procalcitonin 0.46 (H) <0.05 ng/mL   Troponin T, High Sensitivity (now)   Result Value Ref Range    Troponin T, High Sensitivity 54 (H) <=22 ng/L   Nt probnp inpatient   Result Value Ref Range    N terminal Pro BNP Inpatient 750 0 - 1,800 pg/mL   TSH with free T4 reflex   Result Value Ref Range    TSH 6.56 (H) 0.30 - 4.20 uIU/mL   Result Value Ref Range    Free T4 0.73 (L) 0.90 - 1.70 ng/dL   Symptomatic Influenza A/B, RSV, & SARS-CoV2 PCR (COVID-19) Nasopharyngeal    Specimen: Nasopharyngeal; Swab   Result Value Ref Range    Influenza A PCR Negative Negative    Influenza B PCR Negative Negative    RSV PCR Negative Negative    SARS CoV2 PCR Negative Negative       RADIOLOGY:  Reviewed all pertinent imaging. Please see official radiology report.  Chest XR,  PA & LAT   Final Result   IMPRESSION: Mildly enlarged cardiac silhouette. Patchy airspace infiltrates in the lung bases and right upper lobe concerning for developing pneumonia. No visible pneumothorax or pleural effusion.          EKG:    Sinus tachycardia.  Rate of 107.  Incomplete right bundle branch block.  Normal QT.  Nonspecific ST changes noted.  Compared to the EKG on 12/4/2022 no significant changes are noted.    I have independently reviewed and interpreted the EKG(s) documented above.    PROCEDURES:   N/A        I, Jack Ingram, am serving as a scribe to document services personally performed by Damon Lagunas DO based on my observation and the provider's statements to me. I, Damon Lagunas, attest that Jack Ingram is acting in a scribe capacity, has observed my performance of the services and has  documented them in accordance with my direction.    Damon Lagunas DO  Emergency Medicine  [facility] EMERGENCY DEPARTMENT  [facility] [id number] [phone]       Damon Lagunas DO  11/02/23 0047

## 2023-11-02 NOTE — PROGRESS NOTES
[facility]    Medicine Progress Note - Hospitalist Service    Date of Admission:  11/1/2023    Assessment & Plan                Colin Car is a 88 year old male with history of HTN, HLD, CKD 3 and hypothyroidism who presented for evaluation of fatigue diagnosed with community Acquired Pneumonia. Hospital Day: 2        # Community Acquired Pneumonia  #Sepsis present on admission  -presented for evaluation of 2 days of fatigue found to be febrile with leukocytosis and bilateral lower and right upper lobe infiltrates.  Started on Rocephin and azithromycin, continue.  Continue scheduled Duonebs. Supplemental O2. Monitor vitals closely.   -trend WBC, uptrending slightly so far  -blood cultures in process  -tylenol for ongoing fever, please repeat blood cultures if persists overnight  -unsure if he may need Speech eval, currently too drowsy so hold off  -COVID and influenza negative     #Fatigue- Multifactorial. Treat PNA and other metabolic derangements. More drowsy today and continue to monitor closely.     #Tachycardia- HR improved. Likely 2/2 to CAP. Telemetry monitoring.     #Elevated Troponin- Reportedly denied chest pain. Expect demand ischemia from tachycardia, sepsis. Continue telemetry monitoring. Repeat trop in AM to trend.    #HTN- Resume home lisinopril at lower dose 10 mg for now.  Hold home hydrochlorothiazide.  Monitor vitals closely.     #Hypothyroidism- Increased dosage of Levothyroxine from 25 mcg to 37.5mcg due to elevated TSH 6.5 slightly above target for elderly. Follow up TSH in 4-6 weeks.    #CKD 3a  -Baseline Cr 1.3, unchanged here  -Monitor, at risk for JOSÉ MIGUEL    #Polycythemia  -noted in the past going back many years, but don't see that he has ever had workup like JAK2 testing  -Hgb barely above normal here 17.8  -Could consider starting ASA though he also has somewhat low platelet count so may mitigate thrombosis risk  -Consider Heme referral as outpatient       DVT  Prophylaxis: Moderate risk. SCDs  Diet: Combination Diet Low Saturated Fat Na <2400mg Diet, No Caffeine Diet    Logan Catheter: Not present  Lines: None     Cardiac Monitoring: ACTIVE order. Indication: Tachyarrhythmias, acute (48 hours)  Code Status: Full Code      Clinically Significant Risk Factors Present on Admission                  # Hypertension: Noted on problem list                 Disposition Plan   Disposition: Home     Expected Discharge Date: 11/04/2023             Medically ready to discharge today: No     The patient's care was discussed with the Bedside Nurse, Patient, and Patient's Family.    Ynes Clancy MD  Hospitalist Service  [facility]  Securely message with myseekit (more info)  Text page via [employer] Paging/Directory   ______________________________________________________________________      Physical Exam   Vital Signs: Temp: (!) 102.3  F (39.1  C) Temp src: Oral BP: (!) 177/98 Pulse: 98   Resp: 18 SpO2: 92 % O2 Device: None (Room air)    Weight: 155 lbs 3.26 oz  General: drowsy elderly male, rouses a little bit to voice, opens eyes and soft mumbled response (interp could not hear him) then falls back asleep  HEENT: Head normocephalic atraumatic, oral mucosa moist. Sclerae anicteric but injected in appearance  CV: Regular rhythm, normal rate, no murmurs  Resp: upper airway rhonchi noted, no wheezing on auscultation, though shallow respiration noted, congested sounding cough  GI: Belly soft, nondistended, nontender, bowel sounds present  Skin: No rashes or lesions  Extremities: No peripheral edema  Psych: Normal affect, drowsy  Neuro: drowsy        Medical Decision Making               Data   Recent Results (from the past 12 hour(s))   Basic metabolic panel    Collection Time: 11/02/23  6:54 AM   Result Value Ref Range    Sodium 139 135 - 145 mmol/L    Potassium 3.8 3.4 - 5.3 mmol/L    Chloride 104 98 - 107 mmol/L    Carbon Dioxide (CO2) 23 22 - 29 mmol/L    Anion Gap  12 7 - 15 mmol/L    Urea Nitrogen 19.8 8.0 - 23.0 mg/dL    Creatinine 1.30 (H) 0.67 - 1.17 mg/dL    GFR Estimate 54 (L) >60 mL/min/1.73m2    Calcium 9.2 8.8 - 10.2 mg/dL    Glucose 119 (H) 70 - 99 mg/dL   CBC with platelets and differential    Collection Time: 11/02/23  6:54 AM   Result Value Ref Range    WBC Count 12.5 (H) 4.0 - 11.0 10e3/uL    RBC Count 5.55 4.40 - 5.90 10e6/uL    Hemoglobin 17.8 (H) 13.3 - 17.7 g/dL    Hematocrit 53.4 (H) 40.0 - 53.0 %    MCV 96 78 - 100 fL    MCH 32.1 26.5 - 33.0 pg    MCHC 33.3 31.5 - 36.5 g/dL    RDW 12.8 10.0 - 15.0 %    Platelet Count 139 (L) 150 - 450 10e3/uL    % Neutrophils 61 %    % Lymphocytes 25 %    % Monocytes 12 %    % Eosinophils 0 %    % Basophils 1 %    % Immature Granulocytes 1 %    NRBCs per 100 WBC 0 <1 /100    Absolute Neutrophils 7.8 1.6 - 8.3 10e3/uL    Absolute Lymphocytes 3.1 0.8 - 5.3 10e3/uL    Absolute Monocytes 1.5 (H) 0.0 - 1.3 10e3/uL    Absolute Eosinophils 0.0 0.0 - 0.7 10e3/uL    Absolute Basophils 0.1 0.0 - 0.2 10e3/uL    Absolute Immature Granulocytes 0.1 <=0.4 10e3/uL    Absolute NRBCs 0.0 10e3/uL     Interval History     Patient very lethargic when I visit with him today and is not responding to questions.  Productive cough noted.  Having some upper airway stridor and very very shallow respiration.  Conjunctiva appears injected.  Being treated for pneumonia.  Not ready for discharge.     I called and updated his granddaughter.  She notes that his breathing does tend to be somewhat noisy at baseline but it is not typically labored.

## 2023-11-02 NOTE — ED NOTES
Pt up to restroom with assist of one. Able tomaintain in restroom by self. Helped back to room. Noted audible wheezing on way back to room. O2 sat 94-95 RA. Wants to sit in chair now.

## 2023-11-02 NOTE — PROGRESS NOTES
TRANSITIONS OF CARE (JOSE JUAN) LOG    JOSE JUAN tasks should be completed by the CC within one (1) business day of notification of each transition. Follow up contact with member is required after return to their usual care setting.  Note:  If CC finds out about the transitions fifteen (15) days or more after the member has returned to their usual care setting, no JOSE JUAN log is needed. However, the CC should check in with the member to discuss the transition process, any changes needed to the care plan and document it in a case note.     Member Name:  Colin Car [facility] Name:  [employer] Member ID#: [id number]   Product: [facility] Care Coordinator Contact:  Shazia Quezada RN, PHN Agency/County/Care System: [facility]   Transition Communication Actions from Care Management Contact   Transition #1   Notification Date: 11/2/2023 Transition Date:   11/1/2023 Transition From: Home     Is this the member s usual care setting?               yes Transition To: [facility]   Transition Type:  Unplanned    Documentation from conversation with the member/responsible party, provider, discharging and receiving facility:   Date: 11/2/2023: Received notification of admission to hospital with dx of community acquired pneumonia  CC contacted Hospital /discharge planner via the [employer] [facility]itional Care Hand-In Process, with community care plan included.  CC reached out to family    regarding transition and offered support as needed.  Reviewed and update care plan as needed.  Notified community service providers and placed services [facility] transportation on hold as needed.  Transition log initiated.   PCP, Allison Belle, notified of hospitalization via EMR.    Shazia Quezada RN, BSN, PHN  [facility]  Phone: [phone]       Transition #2   Notification Date: 11/6/2023 11/4/2023 Transition From: [facility]     Is this the member s  usual care setting?               no Transition To: [facility]   Transition Type:  Planned    Documentation from conversation with the member/responsible party, provider, discharging and receiving facility:   Date: 11/6/2023: Received notification of transition to home.  CC contacted adult son Robbie Car  and reviewed discharge summary.  Member has a follow-up appointment with PCP in 7 days: Yes: scheduled on 11/6/2023, but Son had to reschedule due to conflict with schedule. Per EMR member does have an appointment scheduled for 11/13/2023 with PCP. Son was not aware, but he will check with other family members who may have scheduled this follow up.     Member has had a change in condition: No  Home visit needed: No  Care plan reviewed and updated.  The following home based services [facility] were resumed.  New referrals placed: No  Transition log completed.   PCP, Allison Belle, notified of transition back to home via EMR.    Shazia Quezada RN, BSN, N  [facility]  Phone: [phone]       *RETURN TO USUAL CARE SETTING: *Complete tasks below when the member is discharging TO their usual care setting within one (1) business day of notification..      For situations where the Care Coordinator is notified of the discharge prior to the date of discharge, the Care Coordinator must follow up with the member or designated representative to confirm that discharge actually occurred and discuss required JOSE JUAN tasks as outlined in the JOSE JUAN Instructions.  (This includes situations where it may be a  new  usual care setting for the member. (i.e., a community member who decides upon permanent nursing home placement following hospitalization and rehab).    Discuss with Member/Responsible Party:    Check  Yes  - if the member, family member and/or SNF/facility staff manages the following:    If  No  provide explanation in the comments section.          Date completed: 11/6/2023  Communicated with member or their designated representative about the following:  care transition process; about changes to the member s health status; plan of care updates; education about transitions and how to prevent unplanned transitions/readmissions    Four Pillars for Optimal Transition:    Check  Yes  - if the member, family member and/or SNF/facility staff manages the following:    If  No  provide explanation in the comments section.          [x]  Yes     []  No Does the member have a follow-up appointment scheduled with primary care or specialist? (Mental health hospitalizations--the appt. should be w/in 7 days)              For mental health hospitalizations:  []  Yes     []  No     Does the member have a follow-up appointment scheduled with a mental health practitioner within 7 days of discharge?  [x]  Yes     []  No     Has a medication review been completed with member? If no, refer to PCP, home care nurse, MTM, pharmacist  [x]  Yes     []  No     Can the member manage their medications or is there a system in place to manage medications (e.g. home care set-up)?         [x]  Yes     []  No     Can the member verbalize warning signs and symptoms to watch for and how to respond?  [x]  Yes     []  No     Does the member have a copy of and understand their discharge instructions?  If no, assist to obtain copy of discharge instructions, review discharge instructions, and assist to contact PCP to discuss questions about their recent hospitalization.  [x]  Yes     []  No     Does the member have adequate food, housing and transportation?  If no, add goal and discuss additional supports available to the member                                                                                                                                                                                 [x]  Yes     []  No     Is the member safe in their home?  If no, document needs and support provided                                                                                                                                                                           []  Yes     [x]  No     Are there any concerns of vulnerability, abuse, or neglect?  If yes, document concerns and actions taken by Care Coordinator as a mandated                                                                                                                                                                              [x]  Yes     []  No     Does the member use a Personal Health Care Record?  Check  Yes  if visit summary, discharge summary, and/or healthcare summary are being used as a PHR.                                                                                                                                                                                  [x]  Yes     []  No     Have you reviewed the discharge summary with the member? If  No  provide explanation in comments.  [x]  Yes     []  No     Have you updated the member s care plan/support plan? Add new diagnosis, medications, treatments, goals & interventions, as applicable. If No, provide explanation in comments.    Comments:           Notes from conversation with the member/responsible party, provider, discharging and receiving facility (as applicable):

## 2023-11-02 NOTE — PROVIDER NOTIFICATION
notified lactic acid at 2.6, no orders obtained but MD acknowledged lab value. Recheck in      Yulissa Mcfarlane RN

## 2023-11-02 NOTE — PROVIDER NOTIFICATION
RCAT Treatment Plan    Patient Score: 10  Patient Acuity: 4    Clinical Indication for Therapy: prevent atelectasis    Therapy Ordered: Duo Neb QID     Assessment Summary: Pt remains on RA and able to maintain adequate saturations. Bs: exp wheezes auscultated upper airways and decreased @ bases. Pt does have fair congested non-productive cough. Recommend Flutter Valve and IS for prevention of Atelectasis and mucus clearances. RCAT will be re-evaluated in 72 hrs per respiratory protocol.     Ana Hutchinson, RRT  11/2/2023

## 2023-11-02 NOTE — ED TRIAGE NOTES
Colin arrives to triage in wheelchair with family (son and daughter-in-law) with concerns of increased weakness that son noticed tonight. Tachypneic and tachycardiac in triage.      Triage Assessment (Adult)       Row Name 11/01/23 2005          Triage Assessment    Airway WDL WDL        Respiratory WDL    Respiratory WDL X;rhythm/pattern     Rhythm/Pattern, Respiratory shortness of breath;tachypneic        Skin Circulation/Temperature WDL    Skin Circulation/Temperature WDL X;temperature     Skin Temperature warm        Cardiac WDL    Cardiac WDL X;rhythm     Pulse Rate & Regularity tachycardic        Peripheral/Neurovascular WDL    Peripheral Neurovascular WDL WDL        Cognitive/Neuro/Behavioral WDL    Cognitive/Neuro/Behavioral WDL WDL

## 2023-11-02 NOTE — MEDICATION SCRIBE - ADMISSION MEDICATION HISTORY
Medication Scribe Admission Medication History    Admission medication history is complete. The information provided in this note is only as accurate as the sources available at the time of the update.    Information Source(s): Patient via in-person    Pertinent Information: Patient is unreliable resource. Up on asking patient's date of birth via  services, patient keep repeating his first and last name. In the process the  service pippa disconnected. One of ED team happens to be Hmong speaker and was able to help translate.    Changes made to PTA medication list:  Added: None  Deleted: Tylenol 500 mg tablet, Calcium carbonate-Vitamin D3 600-400 mg-unit tablet(per fill history and patient)  Changed: None    Medication Affordability:  Not including over the counter (OTC) medications, was there a time in the past 3 months when you did not take your medications as prescribed because of cost?: Unable to Assess    Allergies reviewed with patient and updates made in EHR: yes    Medication History Completed By: Katya Camacho 11/2/2023 2:10 AM    PTA Med List   Medication Sig Last Dose    atorvastatin (LIPITOR) 40 MG tablet TAKE 1 PILL BY MOUTH EVERY DAY/TXHUA HNUB NOJ 1 LUB TSHUAJ PAB NTSHAV MUAJ ROJ Unknown at unknown    levothyroxine (SYNTHROID/LEVOTHROID) 25 MCG tablet TAKE 1 PILL BY MOUTH EVERY DAY/ NOJ IB LUB IB HNUB PAB AVELINO LUB THYROID Unknown at unknown    lisinopril-hydrochlorothiazide (ZESTORETIC) 20-25 MG tablet Take 1 tablet by mouth daily 11/1/2023 at am

## 2023-11-02 NOTE — PROGRESS NOTES
Left message and received call back from pt's grand daughter Susan regarding room change to 208. Pre transfer report phoned to Ana María AUGUSTIN

## 2023-11-03 ENCOUNTER — APPOINTMENT (OUTPATIENT)
Dept: INTERPRETER SERVICES | Facility: CLINIC | Age: 88
End: 2023-11-03
Payer: COMMERCIAL

## 2023-11-03 LAB
ANION GAP SERPL CALCULATED.3IONS-SCNC: 12 MMOL/L (ref 7–15)
BASOPHILS # BLD AUTO: 0.1 10E3/UL (ref 0–0.2)
BASOPHILS NFR BLD AUTO: 1 %
BUN SERPL-MCNC: 17.3 MG/DL (ref 8–23)
CALCIUM SERPL-MCNC: 8.3 MG/DL (ref 8.8–10.2)
CHLORIDE SERPL-SCNC: 100 MMOL/L (ref 98–107)
CREAT SERPL-MCNC: 1.29 MG/DL (ref 0.67–1.17)
CRP SERPL-MCNC: 190.4 MG/L
DEPRECATED HCO3 PLAS-SCNC: 24 MMOL/L (ref 22–29)
EGFRCR SERPLBLD CKD-EPI 2021: 54 ML/MIN/1.73M2
EOSINOPHIL # BLD AUTO: 0 10E3/UL (ref 0–0.7)
EOSINOPHIL NFR BLD AUTO: 0 %
ERYTHROCYTE [DISTWIDTH] IN BLOOD BY AUTOMATED COUNT: 12.8 % (ref 10–15)
GLUCOSE SERPL-MCNC: 120 MG/DL (ref 70–99)
HCT VFR BLD AUTO: 52.2 % (ref 40–53)
HGB BLD-MCNC: 17.4 G/DL (ref 13.3–17.7)
IMM GRANULOCYTES # BLD: 0 10E3/UL
IMM GRANULOCYTES NFR BLD: 0 %
LACTATE SERPL-SCNC: 1.4 MMOL/L (ref 0.7–2)
LYMPHOCYTES # BLD AUTO: 2.1 10E3/UL (ref 0.8–5.3)
LYMPHOCYTES NFR BLD AUTO: 20 %
MCH RBC QN AUTO: 32.1 PG (ref 26.5–33)
MCHC RBC AUTO-ENTMCNC: 33.3 G/DL (ref 31.5–36.5)
MCV RBC AUTO: 96 FL (ref 78–100)
MONOCYTES # BLD AUTO: 1.2 10E3/UL (ref 0–1.3)
MONOCYTES NFR BLD AUTO: 11 %
NEUTROPHILS # BLD AUTO: 6.9 10E3/UL (ref 1.6–8.3)
NEUTROPHILS NFR BLD AUTO: 68 %
NRBC # BLD AUTO: 0 10E3/UL
NRBC BLD AUTO-RTO: 0 /100
PLATELET # BLD AUTO: 128 10E3/UL (ref 150–450)
POTASSIUM SERPL-SCNC: 3.5 MMOL/L (ref 3.4–5.3)
RBC # BLD AUTO: 5.42 10E6/UL (ref 4.4–5.9)
SODIUM SERPL-SCNC: 136 MMOL/L (ref 135–145)
TROPONIN T SERPL HS-MCNC: 53 NG/L
WBC # BLD AUTO: 10.2 10E3/UL (ref 4–11)

## 2023-11-03 PROCEDURE — 86140 C-REACTIVE PROTEIN: CPT | Performed by: HOSPITALIST

## 2023-11-03 PROCEDURE — 250N000013 HC RX MED GY IP 250 OP 250 PS 637: Performed by: FAMILY MEDICINE

## 2023-11-03 PROCEDURE — 83605 ASSAY OF LACTIC ACID: CPT | Performed by: HOSPITALIST

## 2023-11-03 PROCEDURE — 120N000001 HC R&B MED SURG/OB

## 2023-11-03 PROCEDURE — 80048 BASIC METABOLIC PNL TOTAL CA: CPT | Performed by: HOSPITALIST

## 2023-11-03 PROCEDURE — 250N000011 HC RX IP 250 OP 636: Mod: JZ | Performed by: FAMILY MEDICINE

## 2023-11-03 PROCEDURE — 36415 COLL VENOUS BLD VENIPUNCTURE: CPT | Performed by: HOSPITALIST

## 2023-11-03 PROCEDURE — 250N000013 HC RX MED GY IP 250 OP 250 PS 637: Performed by: HOSPITALIST

## 2023-11-03 PROCEDURE — 94640 AIRWAY INHALATION TREATMENT: CPT | Mod: 76

## 2023-11-03 PROCEDURE — 85025 COMPLETE CBC W/AUTO DIFF WBC: CPT | Performed by: HOSPITALIST

## 2023-11-03 PROCEDURE — 84484 ASSAY OF TROPONIN QUANT: CPT | Performed by: HOSPITALIST

## 2023-11-03 PROCEDURE — 999N000157 HC STATISTIC RCP TIME EA 10 MIN

## 2023-11-03 PROCEDURE — 99233 SBSQ HOSP IP/OBS HIGH 50: CPT | Performed by: HOSPITALIST

## 2023-11-03 PROCEDURE — 250N000009 HC RX 250: Performed by: FAMILY MEDICINE

## 2023-11-03 RX ORDER — ATORVASTATIN CALCIUM 40 MG/1
40 TABLET, FILM COATED ORAL DAILY
Status: DISCONTINUED | OUTPATIENT
Start: 2023-11-04 | End: 2023-11-04 | Stop reason: HOSPADM

## 2023-11-03 RX ORDER — ALBUTEROL SULFATE 90 UG/1
2 AEROSOL, METERED RESPIRATORY (INHALATION) EVERY 6 HOURS PRN
Qty: 18 G | Refills: 0 | Status: SHIPPED | OUTPATIENT
Start: 2023-11-03

## 2023-11-03 RX ORDER — LISINOPRIL 20 MG/1
20 TABLET ORAL DAILY
Status: DISCONTINUED | OUTPATIENT
Start: 2023-11-04 | End: 2023-11-04 | Stop reason: HOSPADM

## 2023-11-03 RX ADMIN — IPRATROPIUM BROMIDE AND ALBUTEROL SULFATE 3 ML: .5; 3 SOLUTION RESPIRATORY (INHALATION) at 15:49

## 2023-11-03 RX ADMIN — IPRATROPIUM BROMIDE AND ALBUTEROL SULFATE 3 ML: .5; 3 SOLUTION RESPIRATORY (INHALATION) at 12:37

## 2023-11-03 RX ADMIN — CEFTRIAXONE SODIUM 2 G: 2 INJECTION, POWDER, FOR SOLUTION INTRAMUSCULAR; INTRAVENOUS at 22:10

## 2023-11-03 RX ADMIN — IPRATROPIUM BROMIDE AND ALBUTEROL SULFATE 3 ML: .5; 3 SOLUTION RESPIRATORY (INHALATION) at 20:53

## 2023-11-03 RX ADMIN — LISINOPRIL 10 MG: 5 TABLET ORAL at 08:52

## 2023-11-03 RX ADMIN — LEVOTHYROXINE SODIUM 37.5 MCG: 0.03 TABLET ORAL at 06:34

## 2023-11-03 ASSESSMENT — ACTIVITIES OF DAILY LIVING (ADL)
ADLS_ACUITY_SCORE: 39

## 2023-11-03 NOTE — PLAN OF CARE
Problem: Risk for Delirium  Goal: Improved Behavioral Control  Intervention: Minimize Safety Risk  Recent Flowsheet Documentation  Taken 11/3/2023 0858 by Kirsten Norman RN  Communication Enhancement Strategies:   call light answered in person    utilized     Problem: Fall Injury Risk  Goal: Absence of Fall and Fall-Related Injury  Outcome: Progressing   Goal Outcome Evaluation:       VS stable. He denies any SOB and chest pain oxygen saturation maintained well on RA . Lung sounds  with expiratory wheeze, crackles and diminished noted. Dyspnea execration noted with activity. Is rhythm has been  NSR/tachy cardia pt  ambulated  with SBA.

## 2023-11-03 NOTE — PLAN OF CARE
"Pt sleeping between cares and rounding.  Denies pain or discomfort.  NSR on telemetry.  Lung sounds with faint crackles with expiratory wheezes to diminished.  Pt reports his breathing \"it ok\" when staff used as  for assessment.  LUNA.    Orin Jj RN                          "

## 2023-11-03 NOTE — PROGRESS NOTES
RESPIRATORY CARE NOTE   Patient is on ra, BS coarse/exp.wheezing, gave DUONEB/Flutter  treatment x4, BS post treatment increased aeration, patient perceives mild improvement, patient tolerated well.    Patient is accompanied by family and does not speak english. RT will follow.    Sandro Maldonado, RT

## 2023-11-03 NOTE — PROGRESS NOTES
RESPIRATORY CARE NOTE   Pt on RA, given Duoneb inline with FV with good effort. BS are coarse/diminished with increased aeration post tx.   Dr. Clancy to order Albuterol MDI for discharge, RT to teach once ordered. Will communicate to next RT.    Marianne Mooney, RT     rolling walker

## 2023-11-03 NOTE — PROGRESS NOTES
[facility]    Medicine Progress Note - Hospitalist Service    Date of Admission:  11/1/2023    Assessment & Plan                  Colin Car is a 88 year old male with history of HTN, HLD, CKD 3 and hypothyroidism who presented for evaluation of fatigue diagnosed with community Acquired Pneumonia. Hospital Day: 3        # Community Acquired Pneumonia  #Sepsis present on admission  -presented for evaluation of 2 days of fatigue found to be febrile with leukocytosis and bilateral lower and right upper lobe infiltrates.  Started on Rocephin and azithromycin, continue.  Continue scheduled Duonebs. Supplemental O2 prn- currently on Ra. Monitor vitals closely.   -trend WBC, normalized now  -blood cultures in process  -has tentatively defervesced  -Speech eval to rule out dysphagia as cause  -COVID and influenza negative  -overall appears to have improved very rapidly, given advanced age and how ill he was as recently as yesterday, very reluctant to discharge him just yet, but possibly tomorrow if continues to do well     #Fatigue- Multifactorial. Treating PNA and other metabolic derangements. Seems much improved    #Wheezing- Noted on exam. Denies dyspnea. Would not be surprised if he has some underlying lung disease. Gdtr does thing he has been told he has COPD before. On no inhalers at home. Would benefit from spirometry if not formal PFTs as outpatient. Gdtr does not think patient will likely comply with nebs at home, but thinks albuterol MDI would be useful to have. Ordered MDI from our pharmacy and when it arrives to the floor, RT will teach patient to use (likely with spacer).     #Tachycardia- HR improved. Likely 2/2 to CAP. Telemetry monitoring.     #Elevated Troponin- denies chest pain. Expect demand ischemia from tachycardia, sepsis. Continue telemetry monitoring. Troponin 54-->53. No further workup during this hospital stay given asymptomatic.    #HTN- Increase lisinopril back to  home dose 10 mg now.  Hold home hydrochlorothiazide.  Monitor vitals closely.     #Hypothyroidism- Increased dosage of Levothyroxine from 25 mcg to 37.5mcg due to elevated TSH 6.5 slightly above target for elderly. Follow up TSH in 4-6 weeks.    #CKD 3a  -Baseline Cr 1.3, unchanged here  -Monitor, at risk for JOSÉ MIGUEL    #Polycythemia  -noted in the past going back many years, but don't see that he has ever had workup like JAK2 testing  -Hgb barely above normal here 17.8  -Could consider starting ASA though he also has somewhat low platelet count so may mitigate thrombosis risk  -Consider Heme referral as outpatient    #HLD  -Resume home atorvastatin       DVT Prophylaxis: Moderate risk. SCDs  Diet: Combination Diet Regular Diet    Logan Catheter: Not present  Lines: None     Cardiac Monitoring: ACTIVE order. Indication: Tachyarrhythmias, acute (48 hours)  Code Status: Full Code      Clinically Significant Risk Factors          # Hypocalcemia: Lowest Ca = 8.3 mg/dL in last 2 days, will monitor and replace as appropriate       # Thrombocytopenia: Lowest platelets = 128 in last 2 days, will monitor for bleeding   # Hypertension: Noted on problem list                   Disposition Plan   Disposition: Home    Expected Discharge Date: 11/04/2023             Medically ready to discharge today: No     The patient's care was discussed with the Bedside Nurse, Patient, Patient's Family, and RT .    Ynes Clancy MD  Hospitalist Service  [facility]  Securely message with Curbside (more info)  Text page via [employer] Paging/Directory   ______________________________________________________________________      Physical Exam   Vital Signs: Temp: 98.6  F (37  C) Temp src: Oral BP: (!) 145/94 Pulse: 106   Resp: 26 SpO2: 95 % O2 Device: None (Room air)    Weight: 154 lbs 12.21 oz  General: well appearing elderly male sitting in bedside chair, alert and oriented x3, got up and was about to walk around, had set of chair  alarm before I came in  HEENT: Head normocephalic atraumatic, oral mucosa moist. Sclerae anicteric, less red today  CV: Regular rhythm, normal rate, no murmurs  Resp: occasional inspiratory and diffuse end expiratory wheezing, fair air movement noted, no respiratory distress  GI: Belly soft, nondistended, nontender, bowel sounds present  Skin: No rashes or lesions  Extremities: No peripheral edema  Psych: Normal affect, mood euthymic  Neuro: grossly normal        Medical Decision Making               Data   Recent Results (from the past 12 hour(s))   Troponin T, High Sensitivity    Collection Time: 11/03/23  6:18 AM   Result Value Ref Range    Troponin T, High Sensitivity 53 (H) <=22 ng/L   Basic metabolic panel    Collection Time: 11/03/23  6:18 AM   Result Value Ref Range    Sodium 136 135 - 145 mmol/L    Potassium 3.5 3.4 - 5.3 mmol/L    Chloride 100 98 - 107 mmol/L    Carbon Dioxide (CO2) 24 22 - 29 mmol/L    Anion Gap 12 7 - 15 mmol/L    Urea Nitrogen 17.3 8.0 - 23.0 mg/dL    Creatinine 1.29 (H) 0.67 - 1.17 mg/dL    GFR Estimate 54 (L) >60 mL/min/1.73m2    Calcium 8.3 (L) 8.8 - 10.2 mg/dL    Glucose 120 (H) 70 - 99 mg/dL   CRP inflammation    Collection Time: 11/03/23  6:18 AM   Result Value Ref Range    CRP Inflammation 190.40 (H) <5.00 mg/L   Lactic acid whole blood    Collection Time: 11/03/23  6:18 AM   Result Value Ref Range    Lactic Acid 1.4 0.7 - 2.0 mmol/L   CBC with platelets and differential    Collection Time: 11/03/23  6:18 AM   Result Value Ref Range    WBC Count 10.2 4.0 - 11.0 10e3/uL    RBC Count 5.42 4.40 - 5.90 10e6/uL    Hemoglobin 17.4 13.3 - 17.7 g/dL    Hematocrit 52.2 40.0 - 53.0 %    MCV 96 78 - 100 fL    MCH 32.1 26.5 - 33.0 pg    MCHC 33.3 31.5 - 36.5 g/dL    RDW 12.8 10.0 - 15.0 %    Platelet Count 128 (L) 150 - 450 10e3/uL    % Neutrophils 68 %    % Lymphocytes 20 %    % Monocytes 11 %    % Eosinophils 0 %    % Basophils 1 %    % Immature Granulocytes 0 %    NRBCs per 100 WBC 0 <1  /100    Absolute Neutrophils 6.9 1.6 - 8.3 10e3/uL    Absolute Lymphocytes 2.1 0.8 - 5.3 10e3/uL    Absolute Monocytes 1.2 0.0 - 1.3 10e3/uL    Absolute Eosinophils 0.0 0.0 - 0.7 10e3/uL    Absolute Basophils 0.1 0.0 - 0.2 10e3/uL    Absolute Immature Granulocytes 0.0 <=0.4 10e3/uL    Absolute NRBCs 0.0 10e3/uL     Interval History     Patient appears much better today, he tells me he wants to go home.  He was quite ill even as recently as yesterday, so I think today is a little too soon but possibly tomorrow if he continues to improve this quickly.  I will call and update his family later today.  Patient quite wheezy on exam but he actually denies any dyspnea.     I called and updated gdtr  Went over discharge instructions  She would like to use our pharmacy for meds  No call from me tomorrow unless there is a significant change in status or discharge plan

## 2023-11-03 NOTE — PLAN OF CARE
Intervention: Minimize Safety Risk  Recent Flowsheet Documentation  Taken 11/2/2023 1819 by Yulissa Mcfarlane, RN  Enhanced Safety Measures: (alarm with in reach) --   Goal Outcome Evaluation:       Pt is in regular diet,saline locked,VS stable except temperature,wheezes present, intrepeter needed to talk,denies pain.  .Yulissa Mcfarlane, DEMETRIA

## 2023-11-04 VITALS
TEMPERATURE: 98.8 F | BODY MASS INDEX: 30.53 KG/M2 | HEART RATE: 97 BPM | RESPIRATION RATE: 22 BRPM | SYSTOLIC BLOOD PRESSURE: 125 MMHG | OXYGEN SATURATION: 94 % | WEIGHT: 156.3 LBS | DIASTOLIC BLOOD PRESSURE: 81 MMHG

## 2023-11-04 LAB
ANION GAP SERPL CALCULATED.3IONS-SCNC: 10 MMOL/L (ref 7–15)
BUN SERPL-MCNC: 22.5 MG/DL (ref 8–23)
CALCIUM SERPL-MCNC: 8.3 MG/DL (ref 8.8–10.2)
CHLORIDE SERPL-SCNC: 102 MMOL/L (ref 98–107)
CREAT SERPL-MCNC: 1.44 MG/DL (ref 0.67–1.17)
CRP SERPL-MCNC: 171.2 MG/L
DEPRECATED HCO3 PLAS-SCNC: 25 MMOL/L (ref 22–29)
EGFRCR SERPLBLD CKD-EPI 2021: 47 ML/MIN/1.73M2
ERYTHROCYTE [DISTWIDTH] IN BLOOD BY AUTOMATED COUNT: 12.6 % (ref 10–15)
GLUCOSE SERPL-MCNC: 180 MG/DL (ref 70–99)
HCT VFR BLD AUTO: 51.1 % (ref 40–53)
HGB BLD-MCNC: 16.7 G/DL (ref 13.3–17.7)
MCH RBC QN AUTO: 31.6 PG (ref 26.5–33)
MCHC RBC AUTO-ENTMCNC: 32.7 G/DL (ref 31.5–36.5)
MCV RBC AUTO: 97 FL (ref 78–100)
PLATELET # BLD AUTO: 159 10E3/UL (ref 150–450)
POTASSIUM SERPL-SCNC: 3.7 MMOL/L (ref 3.4–5.3)
RBC # BLD AUTO: 5.28 10E6/UL (ref 4.4–5.9)
SODIUM SERPL-SCNC: 137 MMOL/L (ref 135–145)
WBC # BLD AUTO: 8.7 10E3/UL (ref 4–11)

## 2023-11-04 PROCEDURE — 85014 HEMATOCRIT: CPT | Performed by: HOSPITALIST

## 2023-11-04 PROCEDURE — 250N000011 HC RX IP 250 OP 636: Performed by: HOSPITALIST

## 2023-11-04 PROCEDURE — 250N000013 HC RX MED GY IP 250 OP 250 PS 637: Performed by: FAMILY MEDICINE

## 2023-11-04 PROCEDURE — 94640 AIRWAY INHALATION TREATMENT: CPT | Mod: 76

## 2023-11-04 PROCEDURE — 250N000013 HC RX MED GY IP 250 OP 250 PS 637: Performed by: HOSPITALIST

## 2023-11-04 PROCEDURE — 90662 IIV NO PRSV INCREASED AG IM: CPT | Performed by: HOSPITALIST

## 2023-11-04 PROCEDURE — G0008 ADMIN INFLUENZA VIRUS VAC: HCPCS | Performed by: HOSPITALIST

## 2023-11-04 PROCEDURE — 86140 C-REACTIVE PROTEIN: CPT | Performed by: HOSPITALIST

## 2023-11-04 PROCEDURE — 80048 BASIC METABOLIC PNL TOTAL CA: CPT | Performed by: HOSPITALIST

## 2023-11-04 PROCEDURE — 999N000157 HC STATISTIC RCP TIME EA 10 MIN

## 2023-11-04 PROCEDURE — 250N000009 HC RX 250: Performed by: FAMILY MEDICINE

## 2023-11-04 PROCEDURE — 258N000003 HC RX IP 258 OP 636: Performed by: FAMILY MEDICINE

## 2023-11-04 PROCEDURE — 999N000226 HC STATISTIC SLP IP EVAL DEFER

## 2023-11-04 PROCEDURE — 99239 HOSP IP/OBS DSCHRG MGMT >30: CPT | Performed by: HOSPITALIST

## 2023-11-04 PROCEDURE — 250N000011 HC RX IP 250 OP 636: Performed by: FAMILY MEDICINE

## 2023-11-04 PROCEDURE — 36415 COLL VENOUS BLD VENIPUNCTURE: CPT | Performed by: HOSPITALIST

## 2023-11-04 PROCEDURE — 85048 AUTOMATED LEUKOCYTE COUNT: CPT | Performed by: HOSPITALIST

## 2023-11-04 RX ORDER — LEVOTHYROXINE SODIUM 75 UG/1
37.5 TABLET ORAL DAILY
Qty: 15 TABLET | Refills: 0 | Status: SHIPPED | OUTPATIENT
Start: 2023-11-04 | End: 2023-11-13

## 2023-11-04 RX ORDER — AZITHROMYCIN 250 MG/1
250 TABLET, FILM COATED ORAL DAILY
Qty: 2 TABLET | Refills: 0 | Status: SHIPPED | OUTPATIENT
Start: 2023-11-04 | End: 2023-11-06

## 2023-11-04 RX ORDER — CEFDINIR 300 MG/1
300 CAPSULE ORAL DAILY
Qty: 4 CAPSULE | Refills: 0 | Status: SHIPPED | OUTPATIENT
Start: 2023-11-04 | End: 2023-11-13

## 2023-11-04 RX ADMIN — AZITHROMYCIN MONOHYDRATE 250 MG: 500 INJECTION, POWDER, LYOPHILIZED, FOR SOLUTION INTRAVENOUS at 01:00

## 2023-11-04 RX ADMIN — ATORVASTATIN CALCIUM 40 MG: 40 TABLET, FILM COATED ORAL at 08:35

## 2023-11-04 RX ADMIN — INFLUENZA A VIRUS A/VICTORIA/4897/2022 IVR-238 (H1N1) ANTIGEN (FORMALDEHYDE INACTIVATED), INFLUENZA A VIRUS A/DARWIN/9/2021 SAN-010 (H3N2) ANTIGEN (FORMALDEHYDE INACTIVATED), INFLUENZA B VIRUS B/PHUKET/3073/2013 ANTIGEN (FORMALDEHYDE INACTIVATED), AND INFLUENZA B VIRUS B/MICHIGAN/01/2021 ANTIGEN (FORMALDEHYDE INACTIVATED) 0.7 ML: 60; 60; 60; 60 INJECTION, SUSPENSION INTRAMUSCULAR at 12:39

## 2023-11-04 RX ADMIN — IPRATROPIUM BROMIDE AND ALBUTEROL SULFATE 3 ML: .5; 3 SOLUTION RESPIRATORY (INHALATION) at 11:55

## 2023-11-04 RX ADMIN — IPRATROPIUM BROMIDE AND ALBUTEROL SULFATE 3 ML: .5; 3 SOLUTION RESPIRATORY (INHALATION) at 07:39

## 2023-11-04 RX ADMIN — LEVOTHYROXINE SODIUM 37.5 MCG: 0.03 TABLET ORAL at 06:41

## 2023-11-04 RX ADMIN — LISINOPRIL 20 MG: 20 TABLET ORAL at 08:34

## 2023-11-04 ASSESSMENT — ACTIVITIES OF DAILY LIVING (ADL)
ADLS_ACUITY_SCORE: 37
ADLS_ACUITY_SCORE: 39
ADLS_ACUITY_SCORE: 37
ADLS_ACUITY_SCORE: 22
ADLS_ACUITY_SCORE: 37
ADLS_ACUITY_SCORE: 22
ADLS_ACUITY_SCORE: 37

## 2023-11-04 NOTE — PROGRESS NOTES
Speech-Language Pathology    SLP orders received. Chart reviewed and discussed with care team.? Speech-Language Pathology Evaluations not indicated due to no reported or resolved deficits related to speech, language, or cognition. Patient tolerating current diet of Regular and Thin with no s/s aspiration per RN. No acute SLP needs identified.? Defer discharge recommendations to treatment team.? Will complete orders.

## 2023-11-04 NOTE — PROGRESS NOTES
Care Management Discharge Note    Discharge Date: 11/04/2023       Discharge Disposition:  home    Discharge Services:  out patient follow up    Discharge DME:  none    Discharge Transportation:  Family    Private pay costs discussed: Not applicable    Does the patient's insurance plan have a 3 day qualifying hospital stay waiver?  No    PAS Confirmation Code:  NA  Patient/family educated on Medicare website which has current facility and service quality ratings:  NA    Education Provided on the Discharge Plan:  per trx team  Persons Notified of Discharge Plans: patient  Patient/Family in Agreement with the Plan:  yes    Handoff Referral Completed: Yes    Additional Information:  I spoke with patient using [employer] . He plans on discharging home with family. They assist as needed. Patient has no home care services or DME. Family to transport.    CYNDY Byrd

## 2023-11-04 NOTE — PLAN OF CARE
Problem: Adult Inpatient Plan of Care  Goal: Optimal Comfort and Wellbeing  Outcome: Progressing     Problem: Adult Inpatient Plan of Care  Goal: Readiness for Transition of Care  Outcome: Progressing   Goal Outcome Evaluation:    - Tachycardic  - On room air  - Denies pain  - Pt up to bathroom and chair  - L PIV infusing antibiotics  - Respiratory w/ 2 nebs  - Pt family in room in evening    Naya Kiran RN

## 2023-11-04 NOTE — PLAN OF CARE
Problem: Adult Inpatient Plan of Care  Goal: Optimal Comfort and Wellbeing  Outcome: Progressing     Problem: Risk for Delirium  Goal: Improved Attention and Thought Clarity  Outcome: Progressing  Intervention: Maximize Cognitive Function  Recent Flowsheet Documentation  Taken 11/4/2023 0050 by Walter Ireland RN  Reorientation Measures:   calendar in view   clock in view   reorientation provided  Goal: Improved Sleep  Outcome: Progressing     Problem: Fall Injury Risk  Goal: Absence of Fall and Fall-Related Injury  Outcome: Progressing  Intervention: Promote Injury-Free Environment  Recent Flowsheet Documentation  Taken 11/4/2023 0050 by Walter Ireland RN  Safety Promotion/Fall Prevention:   activity supervised   clutter free environment maintained   nonskid shoes/slippers when out of bed   patient and family education   safety round/check completed   assistive device/personal items within reach   Goal Outcome Evaluation:    VSS except mildly tachycardic at times. On tele with NSR at reading.     A/Ox3, disoriented to time. Hmong speaking,  utilized.    Denied pain and nausea.    Denied SOB. Expiratory wheezing audible.     Pt reported difficulty sleeping and stated his bed was uncomfortable. Attempted repositioning and additional pillow support. Pt up in chair and back to bed multiple times overnight with limited sleep. Pt stated he was not comfortable here and wanted to go home to sleep. Reassurance provided.    Alarms on. Does not call appropriately to get out of bed or chair, reminder given.     Walter Ireland RN

## 2023-11-04 NOTE — PLAN OF CARE
Goal Outcome Evaluation:                      Patient discharge at 1300 pm, all discharge paper work and education were given to patient and granddaughter. All belongings packed and sent with patient, granddaughter was  for discharge instruction and education per patient requests.    Cindy Danielle RN  11/4/2023

## 2023-11-04 NOTE — DISCHARGE SUMMARY
[facility] MEDICINE  DISCHARGE SUMMARY     Primary Care Physician: Allison Belle  Admission Date: 11/1/2023   Discharge Provider: Ynes Clancy MD Discharge Date: 11/4/2023   Diet:   Active Diet and Nourishment Order   Procedures     Combination Diet Regular Diet     Diet       Code Status: Full Code   Activity: DCACTIVITY: Activity as tolerated        Condition at Discharge: Good     REASON FOR PRESENTATION(See Admission Note for Details)   pneumonia    PRINCIPAL & ACTIVE DISCHARGE DIAGNOSES     Principal Problem:    Pneumonia of right upper lobe due to infectious organism  Active Problems:    Acquired hypothyroidism    HLD (hyperlipidemia)    HTN (hypertension)    Chronic kidney disease, stage 3 (H)    Generalized weakness    Wheezing      PENDING LABS     Unresulted Labs Ordered in the Past 30 Days of this Admission       Date and Time Order Name Status Description    11/1/2023  8:09 PM Blood Culture Line, venous Preliminary     11/1/2023  8:09 PM Blood Culture Line, venous Preliminary             PROCEDURES ( this hospitalization only)      none    RECOMMENDATIONS TO OUTPATIENT PROVIDER FOR F/U VISIT     Follow-up Appointments     Follow-up and recommended labs and tests       Follow up with primary care provider, Allison Belle, within 7 days for   hospital follow- up.  The following labs/tests are recommended: lung   function tests; TSH in 4 weeks.            DISPOSITION     Home    SUMMARY OF HOSPITAL COURSE:      Colin Car is a 88 year old male with history of HTN, HLD, CKD 3 and hypothyroidism who presented for evaluation of fatigue diagnosed with community Acquired Pneumonia. Hospital Day: 4        # Community Acquired Pneumonia  #Sepsis present on admission  -presented for evaluation of 2 days of fatigue found to be febrile with leukocytosis and bilateral lower and right upper lobe infiltrates.  Started on Rocephin and azithromycin with rapid improvement. Continue  azithro x5 days and switch to Omnicef for total 7 days.     #Wheezing- Noted on exam. Denies dyspnea. Would not be surprised if he has some underlying lung disease. Gdtr does thing he has been told he has COPD before. On no inhalers at home. Would benefit from spirometry if not formal PFTs as outpatient. Gdtr does not think patient will likely comply with nebs at home, but thinks albuterol MDI would be useful to have. Ordered MDI from our pharmacy and RT educated patient on use with spacer. Follow up with PCP for further evaluation.     #Elevated Troponin- denies chest pain. Expect demand ischemia from tachycardia, sepsis. Troponin 54-->53. No further workup during this hospital stay given asymptomatic.     #HTN- Home meds held and stepwise restarted here, ok to resume home meds for discharge     #Hypothyroidism- Increased dosage of Levothyroxine from 25 mcg to 37.5mcg due to elevated TSH 6.5 slightly above target for elderly. Follow up TSH in 4-6 weeks.     #Polycythemia  -noted in the past going back many years, but don't see that he has ever had workup like JAK2 testing  -Hgb barely above normal here 17.8  -Could consider starting ASA though he also has somewhat low platelet count so may mitigate thrombosis risk  -Consider Heme referral as outpatient       Discharge Medications with Med changes:     Current Discharge Medication List        START taking these medications    Details   albuterol (PROAIR HFA/PROVENTIL HFA/VENTOLIN HFA) 108 (90 Base) MCG/ACT inhaler Inhale 2 puffs into the lungs every 6 hours as needed for shortness of breath, wheezing or cough  Qty: 18 g, Refills: 0    Comments: Pharmacy may dispense brand covered by insurance (Proair, or proventil or ventolin or generic albuterol inhaler)  Associated Diagnoses: Pneumonia of right upper lobe due to infectious organism; Reactive airway disease without complication, unspecified asthma severity, unspecified whether persistent      azithromycin  (ZITHROMAX) 250 MG tablet Take 1 tablet (250 mg) by mouth daily for 2 days  Qty: 2 tablet, Refills: 0    Associated Diagnoses: Pneumonia of right upper lobe due to infectious organism      cefdinir (OMNICEF) 300 MG capsule Take 1 capsule (300 mg) by mouth daily  Qty: 4 capsule, Refills: 0    Associated Diagnoses: Pneumonia of right upper lobe due to infectious organism           CONTINUE these medications which have CHANGED    Details   levothyroxine (SYNTHROID/LEVOTHROID) 75 MCG tablet Take 0.5 tablets (37.5 mcg) by mouth daily  Qty: 15 tablet, Refills: 0    Associated Diagnoses: Acquired hypothyroidism           CONTINUE these medications which have NOT CHANGED    Details   atorvastatin (LIPITOR) 40 MG tablet TAKE 1 PILL BY MOUTH EVERY DAY/TXHUA HNUB NOJ 1 LUB TSHUAJ PAB NTSHAV MUAJ ROJ  Qty: 90 tablet, Refills: 3    Associated Diagnoses: Mixed hyperlipidemia      lisinopril-hydrochlorothiazide (ZESTORETIC) 20-25 MG tablet Take 1 tablet by mouth daily               Consults     SPEECH LANGUAGE PATH ADULT IP CONSULT  ^ultimately not evaluated by Speech as bedside RN noted no swallowing difficulty      SIGNIFICANT IMAGING FINDINGS     Results for orders placed or performed during the hospital encounter of 11/01/23   Chest XR,  PA & LAT    Impression    IMPRESSION: Mildly enlarged cardiac silhouette. Patchy airspace infiltrates in the lung bases and right upper lobe concerning for developing pneumonia. No visible pneumothorax or pleural effusion.       SIGNIFICANT LABORATORY FINDINGS     See emr    Discharge Orders        Reason for your hospital stay    pneumonia     Follow-up and recommended labs and tests     Follow up with primary care provider, Allison Belle, within 7 days for hospital follow- up.  The following labs/tests are recommended: lung function tests; TSH in 4 weeks.     Activity    Your activity upon discharge: activity as tolerated     When to contact your care team    Call your primary doctor if you  have any of the following: chest pain, shortness of breath, fever, chills, fainting, dizziness, vomiting, constipation, dehydration, worsening pain, bleeding, or trouble urinating, or any other symptoms that are new or concerning to you.     Diet    Follow this diet upon discharge: resume your regular diet       Examination   Physical Exam   Temp:  [98.6  F (37  C)-99.9  F (37.7  C)] 98.8  F (37.1  C)  Pulse:  [] 93  Resp:  [18-26] 20  BP: (121-146)/(76-94) 121/76  SpO2:  [93 %-95 %] 94 %  Wt Readings from Last 1 Encounters:   11/03/23 70.2 kg (154 lb 12.2 oz)       General: well appearing elderly male sitting in bedside chair, alert and oriented x3, asking to go home  HEENT: Head normocephalic atraumatic, oral mucosa moist. Sclerae anicteric, less red today  Resp: occasional inspiratory and diffuse end expiratory wheezing, fair air movement noted, no respiratory distress  Skin: No rashes or lesions  Extremities: No peripheral edema  Psych: Normal affect, mood euthymic  Neuro: grossly normal    Please see EMR for more detailed significant labs, imaging, consultant notes etc.    I, Ynes Clancy MD, personally saw the patient today and spent greater than 30 minutes discharging this patient.    Ynes Clancy MD  [facility]    CC:Allison Belle

## 2023-11-06 LAB
BACTERIA BLD CULT: NO GROWTH
BACTERIA BLD CULT: NO GROWTH

## 2023-11-13 ENCOUNTER — OFFICE VISIT (OUTPATIENT)
Dept: INTERNAL MEDICINE | Facility: CLINIC | Age: 88
End: 2023-11-13
Payer: COMMERCIAL

## 2023-11-13 VITALS
DIASTOLIC BLOOD PRESSURE: 90 MMHG | RESPIRATION RATE: 20 BRPM | WEIGHT: 155.7 LBS | SYSTOLIC BLOOD PRESSURE: 130 MMHG | BODY MASS INDEX: 30.57 KG/M2 | OXYGEN SATURATION: 99 % | HEIGHT: 60 IN | HEART RATE: 95 BPM | TEMPERATURE: 98 F

## 2023-11-13 DIAGNOSIS — D75.1 POLYCYTHEMIA: ICD-10-CM

## 2023-11-13 DIAGNOSIS — E03.9 ACQUIRED HYPOTHYROIDISM: ICD-10-CM

## 2023-11-13 DIAGNOSIS — I10 PRIMARY HYPERTENSION: ICD-10-CM

## 2023-11-13 DIAGNOSIS — R91.8 PULMONARY NODULES: ICD-10-CM

## 2023-11-13 DIAGNOSIS — Z09 HOSPITAL DISCHARGE FOLLOW-UP: Primary | ICD-10-CM

## 2023-11-13 DIAGNOSIS — J18.9 PNEUMONIA OF RIGHT UPPER LOBE DUE TO INFECTIOUS ORGANISM: ICD-10-CM

## 2023-11-13 DIAGNOSIS — Z29.11 NEED FOR VACCINATION AGAINST RESPIRATORY SYNCYTIAL VIRUS: ICD-10-CM

## 2023-11-13 DIAGNOSIS — R06.02 SHORTNESS OF BREATH: ICD-10-CM

## 2023-11-13 PROCEDURE — 99495 TRANSJ CARE MGMT MOD F2F 14D: CPT | Performed by: NURSE PRACTITIONER

## 2023-11-13 RX ORDER — LEVOTHYROXINE SODIUM 75 UG/1
37.5 TABLET ORAL DAILY
Qty: 45 TABLET | Refills: 0 | Status: SHIPPED | OUTPATIENT
Start: 2023-11-13

## 2023-11-13 RX ORDER — RESPIRATORY SYNCYTIAL VIRUS VACCINE 120MCG/0.5
0.5 KIT INTRAMUSCULAR ONCE
Qty: 1 EACH | Refills: 0 | Status: SHIPPED | OUTPATIENT
Start: 2023-11-13 | End: 2023-11-13

## 2023-11-13 NOTE — PATIENT INSTRUCTIONS
- RSV vaccine recommended through the pharmacy     - We should repeat a thyroid lab test in about 6 weeks     - Check your blood pressure readings at home. If your readings are all above 140/90 I would want to adjust your blood pressure medication

## 2023-11-13 NOTE — PROGRESS NOTES
Assessment & Plan   Problem List Items Addressed This Visit       Acquired hypothyroidism     Reviewed TSH that was done in the hospital with dose increased to 37.5. He will be scheduled for a 6 week follow up visit.          Relevant Medications    levothyroxine (SYNTHROID/LEVOTHROID) 75 MCG tablet    Other Relevant Orders    TSH with free T4 reflex    HTN (hypertension)     Blood pressure is elevated today but readings are typically within the desired range. He is encouraged to check his blood pressure weekly, if readings are consistently above 140/90, he should follow up in the office          Pneumonia of right upper lobe due to infectious organism    Shortness of breath     - He complains of SOB and wheezing.  Breath sounds are clear on exam.  An order is placed for pulmonary function testing for further evaluation as well as a CT of the lungs for follow up of peribronchial thickening that was seen on last CT and lung nodules. Follow up after testing is completed  - Continue albuterol PRN         Relevant Orders    General PFT Lab (Please always keep checked)    Pulmonary Function Test    Pulmonary nodules     Repeat CT due 12/2023          Relevant Orders    CT Chest w/o Contrast     Other Visit Diagnoses       Hospital discharge follow-up    -  Primary    Polycythemia        Relevant Orders    Lab Blood Morphology Pathologist Review    Need for vaccination against respiratory syncytial virus        Relevant Medications    respiratory syncytial virus vaccine, bivalent (ABRYSVO) injection          - RSV vaccine recommended through the pharmacy     Post Medication Reconciliation Status:  Discharge medications reconciled, continue medications without change      BMI:   Estimated body mass index is 30.41 kg/m  as calculated from the following:    Height as of this encounter: 1.524 m (5').    Weight as of this encounter: 70.6 kg (155 lb 11.2 oz).           Allison Belle NP  [facility]  JESSE Shaw is a 88 year old, presenting for the following health issues:  Hospital F/U        11/13/2023    11:13 AM   Additional Questions   Roomed by Apolonia RANDLE CMA   Accompanied by Grand daughter       HPI   Hospital Follow-up Visit:    Hospital/Nursing Home/IP Rehab Facility: [facility]  Date of Admission: 11/01/2023  Date of Discharge: 11/04/2023  Reason(s) for Admission: pneumonia  Was your hospitalization related to COVID-19? No   Problems taking medications regularly:  None  Medication changes since discharge: albuterol, levothyroxine, cefdinir  Problems adhering to non-medication therapy:  None    He was seen in the emergency department with a complaint of 2 days of fatigue and found to be febrile with leukocytosis and bilateral lower and right upper lobe infiltrates.  He was started on antibiotics during the hospital stay then transition to azithromycin and Omnicef outpatient.  No known diagnosis of underlying lung disease but he was told at some point that he had COPD. Today he states that he feels about the same, if he is moving then he feels short of breath. He is not coughing. He finished the antibiotic. His granddaughter notes that she believes he was diagnosed with COPD in the past, she does not recall that he was on any inhalers at that time. His granddaughter can hear him wheeze frequently.     Polycythemia was noted during his hospital evaluation, hemoglobin of 17.8.    His dose of levothyroxine was increased due to a mildly elevated TSH.     His blood pressure is elevated today. He reports that he took his  medication this morning. He has not checked his blood pressure recently.         Objective    BP (!) 130/90   Pulse 95   Temp 98  F (36.7  C) (Oral)   Resp 20   Ht 1.524 m (5')   Wt 70.6 kg (155 lb 11.2 oz)   SpO2 99%   BMI 30.41 kg/m    Body mass index is 30.41 kg/m .    Physical Exam   GENERAL: healthy, alert and no distress  RESP: lungs  clear to auscultation - no rales, rhonchi or wheezes  CV: regular rate and rhythm, normal S1 S2, no S3 or S4, no murmur

## 2023-11-13 NOTE — ASSESSMENT & PLAN NOTE
- He complains of SOB and wheezing.  Breath sounds are clear on exam.  An order is placed for pulmonary function testing for further evaluation as well as a CT of the lungs for follow up of peribronchial thickening that was seen on last CT and lung nodules. Follow up after testing is completed  - Continue albuterol PRN

## 2023-11-13 NOTE — ASSESSMENT & PLAN NOTE
Reviewed TSH that was done in the hospital with dose increased to 37.5. He will be scheduled for a 6 week follow up visit.

## 2023-11-13 NOTE — ASSESSMENT & PLAN NOTE
Blood pressure is elevated today but readings are typically within the desired range. He is encouraged to check his blood pressure weekly, if readings are consistently above 140/90, he should follow up in the office

## 2023-11-22 ENCOUNTER — HOSPITAL ENCOUNTER (OUTPATIENT)
Dept: CT IMAGING | Facility: HOSPITAL | Age: 88
Discharge: HOME | End: 2023-11-22
Attending: NURSE PRACTITIONER | Admitting: NURSE PRACTITIONER
Payer: COMMERCIAL

## 2023-11-22 DIAGNOSIS — R91.8 PULMONARY NODULES: ICD-10-CM

## 2023-11-22 PROCEDURE — 71250 CT THORAX DX C-: CPT

## 2023-12-04 ENCOUNTER — TELEPHONE (OUTPATIENT)
Dept: INTERNAL MEDICINE | Facility: CLINIC | Age: 88
End: 2023-12-04
Payer: COMMERCIAL

## 2023-12-04 NOTE — TELEPHONE ENCOUNTER
----- Message from Allison Belle NP sent at 11/29/2023  8:06 AM CST -----  Call patient with : His lung CT shows some scarring in the right middle lobe but is otherwise normal and the lung nodules that were seen previously have resolved.  He should continue with the planned pulmonary function testing to further evaluate the reported wheezing and shortness of breath that he has been experiencing.

## 2023-12-06 ENCOUNTER — APPOINTMENT (OUTPATIENT)
Dept: INTERPRETER SERVICES | Facility: CLINIC | Age: 88
End: 2023-12-06
Payer: COMMERCIAL

## 2023-12-30 ENCOUNTER — HEALTH MAINTENANCE LETTER (OUTPATIENT)
Age: 88
End: 2023-12-30

## 2024-02-11 ENCOUNTER — HOSPITAL ENCOUNTER (OUTPATIENT)
Facility: HOSPITAL | Age: 89
Setting detail: OBSERVATION
Discharge: HOME | End: 2024-02-12
Attending: EMERGENCY MEDICINE | Admitting: INTERNAL MEDICINE
Payer: COMMERCIAL

## 2024-02-11 ENCOUNTER — APPOINTMENT (OUTPATIENT)
Dept: CT IMAGING | Facility: HOSPITAL | Age: 89
End: 2024-02-11
Attending: EMERGENCY MEDICINE
Payer: COMMERCIAL

## 2024-02-11 DIAGNOSIS — R06.2 WHEEZING: ICD-10-CM

## 2024-02-11 DIAGNOSIS — I10 HYPERTENSION, UNSPECIFIED TYPE: ICD-10-CM

## 2024-02-11 DIAGNOSIS — D61.818 PANCYTOPENIA (H): ICD-10-CM

## 2024-02-11 DIAGNOSIS — J20.8 ACUTE BRONCHITIS DUE TO OTHER SPECIFIED ORGANISMS: Primary | ICD-10-CM

## 2024-02-11 DIAGNOSIS — J02.9 PHARYNGITIS, UNSPECIFIED ETIOLOGY: ICD-10-CM

## 2024-02-11 LAB
ANION GAP SERPL CALCULATED.3IONS-SCNC: 12 MMOL/L (ref 7–15)
BASOPHILS # BLD AUTO: 0 10E3/UL (ref 0–0.2)
BASOPHILS NFR BLD AUTO: 1 %
BUN SERPL-MCNC: 15.3 MG/DL (ref 8–23)
CALCIUM SERPL-MCNC: 8.3 MG/DL (ref 8.8–10.2)
CHLORIDE SERPL-SCNC: 104 MMOL/L (ref 98–107)
CREAT SERPL-MCNC: 1.11 MG/DL (ref 0.67–1.17)
DEPRECATED HCO3 PLAS-SCNC: 27 MMOL/L (ref 22–29)
EGFRCR SERPLBLD CKD-EPI 2021: 64 ML/MIN/1.73M2
EOSINOPHIL # BLD AUTO: 0.1 10E3/UL (ref 0–0.7)
EOSINOPHIL NFR BLD AUTO: 3 %
ERYTHROCYTE [DISTWIDTH] IN BLOOD BY AUTOMATED COUNT: 13.5 % (ref 10–15)
FLUAV RNA SPEC QL NAA+PROBE: NEGATIVE
FLUBV RNA RESP QL NAA+PROBE: NEGATIVE
GLUCOSE SERPL-MCNC: 120 MG/DL (ref 70–99)
GROUP A STREP BY PCR: NOT DETECTED
HCT VFR BLD AUTO: 35.4 % (ref 40–53)
HGB BLD-MCNC: 11.7 G/DL (ref 13.3–17.7)
IMM GRANULOCYTES # BLD: 0 10E3/UL
IMM GRANULOCYTES NFR BLD: 0 %
LACTATE SERPL-SCNC: 2 MMOL/L (ref 0.7–2)
LYMPHOCYTES # BLD AUTO: 1.5 10E3/UL (ref 0.8–5.3)
LYMPHOCYTES NFR BLD AUTO: 42 %
MCH RBC QN AUTO: 32.1 PG (ref 26.5–33)
MCHC RBC AUTO-ENTMCNC: 33.1 G/DL (ref 31.5–36.5)
MCV RBC AUTO: 97 FL (ref 78–100)
MONOCYTES # BLD AUTO: 0.4 10E3/UL (ref 0–1.3)
MONOCYTES NFR BLD AUTO: 13 %
NEUTROPHILS # BLD AUTO: 1.4 10E3/UL (ref 1.6–8.3)
NEUTROPHILS NFR BLD AUTO: 41 %
NRBC # BLD AUTO: 0 10E3/UL
NRBC BLD AUTO-RTO: 1 /100
NT-PROBNP SERPL-MCNC: 339 PG/ML (ref 0–1800)
PLATELET # BLD AUTO: 87 10E3/UL (ref 150–450)
POTASSIUM SERPL-SCNC: 3.6 MMOL/L (ref 3.4–5.3)
RBC # BLD AUTO: 3.64 10E6/UL (ref 4.4–5.9)
RSV RNA SPEC NAA+PROBE: NEGATIVE
SARS-COV-2 RNA RESP QL NAA+PROBE: NEGATIVE
SODIUM SERPL-SCNC: 143 MMOL/L (ref 135–145)
TROPONIN T SERPL HS-MCNC: 13 NG/L
TROPONIN T SERPL HS-MCNC: 39 NG/L
TSH SERPL DL<=0.005 MIU/L-ACNC: 2.8 UIU/ML (ref 0.3–4.2)
WBC # BLD AUTO: 3.5 10E3/UL (ref 4–11)

## 2024-02-11 PROCEDURE — 71275 CT ANGIOGRAPHY CHEST: CPT

## 2024-02-11 PROCEDURE — 250N000009 HC RX 250: Performed by: EMERGENCY MEDICINE

## 2024-02-11 PROCEDURE — 250N000013 HC RX MED GY IP 250 OP 250 PS 637: Performed by: INTERNAL MEDICINE

## 2024-02-11 PROCEDURE — G0378 HOSPITAL OBSERVATION PER HR: HCPCS

## 2024-02-11 PROCEDURE — 36415 COLL VENOUS BLD VENIPUNCTURE: CPT | Performed by: EMERGENCY MEDICINE

## 2024-02-11 PROCEDURE — 83880 ASSAY OF NATRIURETIC PEPTIDE: CPT | Performed by: EMERGENCY MEDICINE

## 2024-02-11 PROCEDURE — 99285 EMERGENCY DEPT VISIT HI MDM: CPT | Mod: 25

## 2024-02-11 PROCEDURE — 96375 TX/PRO/DX INJ NEW DRUG ADDON: CPT

## 2024-02-11 PROCEDURE — 87040 BLOOD CULTURE FOR BACTERIA: CPT | Mod: XS | Performed by: EMERGENCY MEDICINE

## 2024-02-11 PROCEDURE — 999N000157 HC STATISTIC RCP TIME EA 10 MIN

## 2024-02-11 PROCEDURE — 250N000009 HC RX 250: Performed by: INTERNAL MEDICINE

## 2024-02-11 PROCEDURE — 84443 ASSAY THYROID STIM HORMONE: CPT | Performed by: EMERGENCY MEDICINE

## 2024-02-11 PROCEDURE — 84484 ASSAY OF TROPONIN QUANT: CPT | Performed by: EMERGENCY MEDICINE

## 2024-02-11 PROCEDURE — 250N000009 HC RX 250: Performed by: STUDENT IN AN ORGANIZED HEALTH CARE EDUCATION/TRAINING PROGRAM

## 2024-02-11 PROCEDURE — 87651 STREP A DNA AMP PROBE: CPT | Performed by: EMERGENCY MEDICINE

## 2024-02-11 PROCEDURE — 80048 BASIC METABOLIC PNL TOTAL CA: CPT | Performed by: EMERGENCY MEDICINE

## 2024-02-11 PROCEDURE — 250N000011 HC RX IP 250 OP 636: Performed by: EMERGENCY MEDICINE

## 2024-02-11 PROCEDURE — 93005 ELECTROCARDIOGRAM TRACING: CPT | Performed by: EMERGENCY MEDICINE

## 2024-02-11 PROCEDURE — 96365 THER/PROPH/DIAG IV INF INIT: CPT | Mod: 59

## 2024-02-11 PROCEDURE — 83605 ASSAY OF LACTIC ACID: CPT | Performed by: EMERGENCY MEDICINE

## 2024-02-11 PROCEDURE — 94640 AIRWAY INHALATION TREATMENT: CPT

## 2024-02-11 PROCEDURE — 85004 AUTOMATED DIFF WBC COUNT: CPT | Performed by: EMERGENCY MEDICINE

## 2024-02-11 PROCEDURE — 87637 SARSCOV2&INF A&B&RSV AMP PRB: CPT | Performed by: STUDENT IN AN ORGANIZED HEALTH CARE EDUCATION/TRAINING PROGRAM

## 2024-02-11 PROCEDURE — G0463 HOSPITAL OUTPT CLINIC VISIT: HCPCS

## 2024-02-11 PROCEDURE — 99223 1ST HOSP IP/OBS HIGH 75: CPT | Performed by: INTERNAL MEDICINE

## 2024-02-11 PROCEDURE — 250N000011 HC RX IP 250 OP 636: Performed by: INTERNAL MEDICINE

## 2024-02-11 PROCEDURE — 70491 CT SOFT TISSUE NECK W/DYE: CPT

## 2024-02-11 RX ORDER — HYDRALAZINE HYDROCHLORIDE 20 MG/ML
10 INJECTION INTRAMUSCULAR; INTRAVENOUS EVERY 4 HOURS PRN
Status: DISCONTINUED | OUTPATIENT
Start: 2024-02-11 | End: 2024-02-12 | Stop reason: HOSPADM

## 2024-02-11 RX ORDER — LIDOCAINE 40 MG/G
CREAM TOPICAL
Status: DISCONTINUED | OUTPATIENT
Start: 2024-02-11 | End: 2024-02-12

## 2024-02-11 RX ORDER — ATORVASTATIN CALCIUM 40 MG/1
40 TABLET, FILM COATED ORAL EVERY EVENING
Status: DISCONTINUED | OUTPATIENT
Start: 2024-02-11 | End: 2024-02-12 | Stop reason: HOSPADM

## 2024-02-11 RX ORDER — PROCHLORPERAZINE 25 MG
12.5 SUPPOSITORY, RECTAL RECTAL EVERY 12 HOURS PRN
Status: DISCONTINUED | OUTPATIENT
Start: 2024-02-11 | End: 2024-02-12 | Stop reason: HOSPADM

## 2024-02-11 RX ORDER — METHYLPREDNISOLONE SODIUM SUCCINATE 125 MG/2ML
125 INJECTION, POWDER, LYOPHILIZED, FOR SOLUTION INTRAMUSCULAR; INTRAVENOUS ONCE
Status: COMPLETED | OUTPATIENT
Start: 2024-02-11 | End: 2024-02-11

## 2024-02-11 RX ORDER — ACETAMINOPHEN 650 MG/1
650 SUPPOSITORY RECTAL EVERY 4 HOURS PRN
Status: DISCONTINUED | OUTPATIENT
Start: 2024-02-11 | End: 2024-02-12 | Stop reason: HOSPADM

## 2024-02-11 RX ORDER — ALBUTEROL SULFATE 5 MG/ML
2.5 SOLUTION RESPIRATORY (INHALATION) EVERY 6 HOURS PRN
Status: DISCONTINUED | OUTPATIENT
Start: 2024-02-11 | End: 2024-02-12

## 2024-02-11 RX ORDER — ACETAMINOPHEN 325 MG/1
650 TABLET ORAL EVERY 4 HOURS PRN
Status: DISCONTINUED | OUTPATIENT
Start: 2024-02-11 | End: 2024-02-12 | Stop reason: HOSPADM

## 2024-02-11 RX ORDER — IPRATROPIUM BROMIDE AND ALBUTEROL SULFATE 2.5; .5 MG/3ML; MG/3ML
3 SOLUTION RESPIRATORY (INHALATION)
Status: COMPLETED | OUTPATIENT
Start: 2024-02-11 | End: 2024-02-11

## 2024-02-11 RX ORDER — IPRATROPIUM BROMIDE AND ALBUTEROL SULFATE 2.5; .5 MG/3ML; MG/3ML
3 SOLUTION RESPIRATORY (INHALATION)
Status: DISCONTINUED | OUTPATIENT
Start: 2024-02-11 | End: 2024-02-12 | Stop reason: HOSPADM

## 2024-02-11 RX ORDER — PIPERACILLIN SODIUM, TAZOBACTAM SODIUM 3; .375 G/15ML; G/15ML
3.38 INJECTION, POWDER, LYOPHILIZED, FOR SOLUTION INTRAVENOUS ONCE
Status: DISCONTINUED | OUTPATIENT
Start: 2024-02-11 | End: 2024-02-11

## 2024-02-11 RX ORDER — PROCHLORPERAZINE MALEATE 5 MG
5 TABLET ORAL EVERY 6 HOURS PRN
Status: DISCONTINUED | OUTPATIENT
Start: 2024-02-11 | End: 2024-02-12 | Stop reason: HOSPADM

## 2024-02-11 RX ORDER — ALBUTEROL SULFATE 0.83 MG/ML
3 SOLUTION RESPIRATORY (INHALATION)
Status: DISCONTINUED | OUTPATIENT
Start: 2024-02-11 | End: 2024-02-12 | Stop reason: HOSPADM

## 2024-02-11 RX ORDER — DEXAMETHASONE SODIUM PHOSPHATE 4 MG/ML
10 INJECTION, SOLUTION INTRA-ARTICULAR; INTRALESIONAL; INTRAMUSCULAR; INTRAVENOUS; SOFT TISSUE ONCE
Status: DISCONTINUED | OUTPATIENT
Start: 2024-02-11 | End: 2024-02-11

## 2024-02-11 RX ORDER — METHYLPREDNISOLONE SODIUM SUCCINATE 40 MG/ML
40 INJECTION, POWDER, LYOPHILIZED, FOR SOLUTION INTRAMUSCULAR; INTRAVENOUS EVERY 24 HOURS
Status: DISCONTINUED | OUTPATIENT
Start: 2024-02-12 | End: 2024-02-12 | Stop reason: HOSPADM

## 2024-02-11 RX ORDER — GUAIFENESIN 600 MG/1
600 TABLET, EXTENDED RELEASE ORAL 2 TIMES DAILY
Qty: 6 TABLET | Refills: 0 | Status: DISCONTINUED | OUTPATIENT
Start: 2024-02-11 | End: 2024-02-12 | Stop reason: HOSPADM

## 2024-02-11 RX ORDER — LISINOPRIL AND HYDROCHLOROTHIAZIDE 20; 25 MG/1; MG/1
1 TABLET ORAL DAILY
Status: DISCONTINUED | OUTPATIENT
Start: 2024-02-11 | End: 2024-02-12 | Stop reason: HOSPADM

## 2024-02-11 RX ORDER — IPRATROPIUM BROMIDE AND ALBUTEROL SULFATE 2.5; .5 MG/3ML; MG/3ML
3 SOLUTION RESPIRATORY (INHALATION) ONCE
Status: COMPLETED | OUTPATIENT
Start: 2024-02-11 | End: 2024-02-11

## 2024-02-11 RX ORDER — ONDANSETRON 2 MG/ML
4 INJECTION INTRAMUSCULAR; INTRAVENOUS EVERY 6 HOURS PRN
Status: DISCONTINUED | OUTPATIENT
Start: 2024-02-11 | End: 2024-02-12 | Stop reason: HOSPADM

## 2024-02-11 RX ORDER — IOPAMIDOL 755 MG/ML
90 INJECTION, SOLUTION INTRAVASCULAR ONCE
Status: COMPLETED | OUTPATIENT
Start: 2024-02-11 | End: 2024-02-11

## 2024-02-11 RX ORDER — CEFTRIAXONE 1 G/1
1 INJECTION, POWDER, FOR SOLUTION INTRAMUSCULAR; INTRAVENOUS EVERY 24 HOURS
Qty: 40 ML | Refills: 0 | Status: DISCONTINUED | OUTPATIENT
Start: 2024-02-11 | End: 2024-02-12 | Stop reason: HOSPADM

## 2024-02-11 RX ORDER — ONDANSETRON 4 MG/1
4 TABLET, ORALLY DISINTEGRATING ORAL EVERY 6 HOURS PRN
Status: DISCONTINUED | OUTPATIENT
Start: 2024-02-11 | End: 2024-02-12 | Stop reason: HOSPADM

## 2024-02-11 RX ORDER — HYDRALAZINE HYDROCHLORIDE 10 MG/1
10 TABLET, FILM COATED ORAL EVERY 4 HOURS PRN
Status: DISCONTINUED | OUTPATIENT
Start: 2024-02-11 | End: 2024-02-12 | Stop reason: HOSPADM

## 2024-02-11 RX ADMIN — ATORVASTATIN CALCIUM 40 MG: 40 TABLET, FILM COATED ORAL at 20:33

## 2024-02-11 RX ADMIN — GUAIFENESIN 600 MG: 600 TABLET ORAL at 15:41

## 2024-02-11 RX ADMIN — IOPAMIDOL 90 ML: 755 INJECTION, SOLUTION INTRAVENOUS at 12:04

## 2024-02-11 RX ADMIN — METHYLPREDNISOLONE SODIUM SUCCINATE 125 MG: 125 INJECTION, POWDER, FOR SOLUTION INTRAMUSCULAR; INTRAVENOUS at 09:16

## 2024-02-11 RX ADMIN — GUAIFENESIN 600 MG: 600 TABLET ORAL at 20:33

## 2024-02-11 RX ADMIN — LISINOPRIL AND HYDROCHLOROTHIAZIDE 1 TABLET: 25; 20 TABLET ORAL at 15:41

## 2024-02-11 RX ADMIN — IPRATROPIUM BROMIDE AND ALBUTEROL SULFATE 3 ML: .5; 3 SOLUTION RESPIRATORY (INHALATION) at 08:43

## 2024-02-11 RX ADMIN — IPRATROPIUM BROMIDE AND ALBUTEROL SULFATE 3 ML: .5; 3 SOLUTION RESPIRATORY (INHALATION) at 20:11

## 2024-02-11 RX ADMIN — IPRATROPIUM BROMIDE AND ALBUTEROL SULFATE 3 ML: .5; 3 SOLUTION RESPIRATORY (INHALATION) at 09:16

## 2024-02-11 RX ADMIN — CEFTRIAXONE SODIUM 1 G: 1 INJECTION, POWDER, FOR SOLUTION INTRAMUSCULAR; INTRAVENOUS at 15:41

## 2024-02-11 RX ADMIN — IPRATROPIUM BROMIDE AND ALBUTEROL SULFATE 3 ML: .5; 3 SOLUTION RESPIRATORY (INHALATION) at 14:42

## 2024-02-11 ASSESSMENT — ACTIVITIES OF DAILY LIVING (ADL)
ADLS_ACUITY_SCORE: 36
ADLS_ACUITY_SCORE: 36
ADLS_ACUITY_SCORE: 38
ADLS_ACUITY_SCORE: 36
ADLS_ACUITY_SCORE: 33
ADLS_ACUITY_SCORE: 36
ADLS_ACUITY_SCORE: 33
DEPENDENT_IADLS:: CLEANING;COOKING;LAUNDRY;SHOPPING;MEAL PREPARATION;MEDICATION MANAGEMENT;MONEY MANAGEMENT;TRANSPORTATION
ADLS_ACUITY_SCORE: 34

## 2024-02-11 ASSESSMENT — ENCOUNTER SYMPTOMS
ABDOMINAL PAIN: 0
RHINORRHEA: 1
VOMITING: 0
SHORTNESS OF BREATH: 1
DIARRHEA: 0
DYSURIA: 0
BLOOD IN STOOL: 0
COUGH: 1

## 2024-02-11 NOTE — ED NOTES
[facility] ED Handoff Report    ED Chief Complaint: shortness of breath    ED Diagnosis:  (R06.2) Wheezing  Comment:   Plan:     (J02.9) Pharyngitis, unspecified etiology  Comment:   Plan:     (I10) Hypertension, unspecified type  Comment:   Plan:        PMH:    Past Medical History:   Diagnosis Date    Hyperlipidemia     Hypertension     Hypothyroidism     Major depressive disorder, recurrent episode, mild (H24)     Created by Conversion         Code Status:  Full Code     Falls Risk: Yes Band: Applied    Current Living Situation/Residence: lives in a house     Elimination Status: Continent: Yes     Activity Level: SBA    Patients Preferred Language:  Other: hmong     Needed: Yes    Vital Signs:  BP (!) 171/98   Pulse 101   Temp 97.7  F (36.5  C) (Temporal)   Resp 30   Ht 1.524 m (5')   Wt 73.2 kg (161 lb 6.4 oz)   SpO2 98%   BMI 31.52 kg/m       Cardiac Rhythm: sr     Pain Score: 2/10    Is the Patient Confused:  No    Last Food or Drink: 02/11/24 at 1300    Focused Assessment:  insp/exp wheezes, SOB and dyspnea on exertion. Denies CP. A&O.     Tests Performed: Done: Labs and Imaging    Treatments Provided:  nebs, abx, solumedrol     Family Dynamics/Concerns: No    Family Updated On Visitor Policy: Yes    Plan of Care Communicated to Family: Yes    Who Was Updated about Plan of Care: family was at bedside    Belongings Checklist Done and Signed by Patient: Yes    Medications sent with patient: n/a    Covid: asymptomatic , negative    Additional Information:     RN: Man Chisholm RN   2/11/2024 4:10 PM

## 2024-02-11 NOTE — ED TRIAGE NOTES
"Pt presents with granddaughter with audible wheezing, shortness of breath and cough for 3 to 4 days. Denies pain or fevers. Does have an albuterol MDI at home but does not know how to use it, family has requested a neb machine and nebs but have not gotten them. Pt is also hypertensive. Pt has medication for this but does not take according to granddaughter, cause \"he doesn't like taking medicine.\"     Triage Assessment (Adult)       Row Name 02/11/24 0828          Triage Assessment    Airway WDL WDL        Respiratory WDL    Respiratory WDL X;rhythm/pattern;cough     Rhythm/Pattern, Respiratory shortness of breath;tachypneic;dyspnea upon exertion     Cough Frequency frequent     Cough Type congested        Skin Circulation/Temperature WDL    Skin Circulation/Temperature WDL WDL        Cardiac WDL    Cardiac WDL X  HTN        Peripheral/Neurovascular WDL    Peripheral Neurovascular WDL WDL        Cognitive/Neuro/Behavioral WDL    Cognitive/Neuro/Behavioral WDL WDL                     "

## 2024-02-11 NOTE — PLAN OF CARE
"PRIMARY DIAGNOSIS: \"GENERIC\" NURSING  OUTPATIENT/OBSERVATION GOALS TO BE MET BEFORE DISCHARGE:  ADLs back to baseline: No    Activity and level of assistance: Up with standby assistance.    Pain status: Pain free.    Return to near baseline physical activity: NO     Discharge Planner Nurse   Safe discharge environment identified: Yes  Barriers to discharge: Yes       Entered by: Melissa Nowak RN 02/11/2024 5:49 PM     Please review provider order for any additional goals.   Nurse to notify provider when observation goals have been met and patient is ready for discharge.Goal Outcome Evaluation:          Pt is alert  Speak Hmong, and  was utilized. Pt on 3 lit of 02, bed alarm on, bed in low position, and call light within reach.               "

## 2024-02-11 NOTE — PROGRESS NOTES
"RCAT Treatment Plan    Patient Score: 14    Patient Acuity: 3    Clinical Indication for Therapy: Admitted with \"acute respiratory insufficiency likely due to reactive airway disease exacerbation,\" though not formally diagnosed. Exhibiting diffuse insp/exp wheezing.     Therapy Ordered: DuoNeb QID + Albu Q2PRN    Assessment Summary: Admitted with acute respiratory insufficiency likely due to reactive airway disease exacerbation., though not formally diagnosed. Exhibiting diffuse insp/exp wheezing. Needing supplemental 02 at 2lpm. Appropriate to continue DuoNeb Q6 for now. Will re-assess in 48-72 hours.     BP (!) 171/98   Pulse 101   Temp 97.7  F (36.5  C) (Temporal)   Resp 30   Ht 1.524 m (5')   Wt 73.2 kg (161 lb 6.4 oz)   SpO2 98%   BMI 31.52 kg/m       Sundar Biggs, RRT   "

## 2024-02-11 NOTE — CONSULTS
Care Management Initial Consult    General Information  Assessment completed with: Patient, Other (daughter in law Bee, who is also PCA), pt and Bee  Type of CM/SW Visit: Initial Assessment    Primary Care Provider verified and updated as needed: Yes   Readmission within the last 30 days: no previous admission in last 30 days      Reason for Consult: discharge planning  Advance Care Planning: Advance Care Planning Reviewed: verified with patient, no concerns identified     General Information Comments: lives w/son Robbie Keyes and his family, has PCA svcs, family to transport    Communication Assessment  Patient's communication style: spoken language (non-English) (Hmong)             Cognitive  Cognitive/Neuro/Behavioral: WDL                      Living Environment:   People in home: child(thuy), adult, grandchild(thuy)     Current living Arrangements: house      Able to return to prior arrangements: yes       Family/Social Support:  Care provided by: child(thuy), self  Provides care for: no one     Children, PCA          Description of Support System: Supportive, Involved    Support Assessment: Adequate family and caregiver support, Adequate social supports    Current Resources:   Patient receiving home care services: No     Community Resources: PCA  Equipment currently used at home:    Supplies currently used at home: None    Employment/Financial:  Employment Status:          Financial Concerns: none   Referral to Financial Worker: No       Does the patient's insurance plan have a 3 day qualifying hospital stay waiver?  No    Lifestyle & Psychosocial Needs:  Social Determinants of Health     Food Insecurity: Not on file   Depression: Not at risk (7/14/2023)    PHQ-2     PHQ-2 Score: 0   Housing Stability: Not on file   Tobacco Use: Low Risk  (11/13/2023)    Patient History     Smoking Tobacco Use: Never     Smokeless Tobacco Use: Never     Passive Exposure: Not on file   Financial Resource Strain: Not on file   Alcohol  Use: Not on file   Transportation Needs: Not on file   Physical Activity: Not on file   Interpersonal Safety: Not on file   Stress: Not on file   Social Connections: Not on file       Functional Status:  Prior to admission patient needed assistance:   Dependent ADLs:: Ambulation-cane, Bathing, Dressing, Grooming, Toileting  Dependent IADLs:: Cleaning, Cooking, Laundry, Shopping, Meal Preparation, Medication Management, Money Management, Transportation  Assesssment of Functional Status: Not at baseline with ADL Functioning    Mental Health Status:  Mental Health Status: No Current Concerns       Chemical Dependency Status:                Values/Beliefs:  Spiritual, Cultural Beliefs, Nondenominational Practices, Values that affect care:                 Additional Information:  Assessed, lives w/son Robbie Keyes and his family, has PCA svcs, family to transport. Discussed [facility]. No CM needs anticipated.    John Car RN

## 2024-02-11 NOTE — PHARMACY-ADMISSION MEDICATION HISTORY
"Pharmacist Admission Medication History    Admission medication history is complete. The information provided in this note is only as accurate as the sources available at the time of the update.    Information Source(s): Family member and CareEverywhere/SureScripts via in-person    Pertinent Information: Patient's granddaughter states patient does not take medications consistently, takes them \"here and there\" as he doesn't like taking medications, also states patient struggles to use albuterol inhaler. Patient does not take any other OTC medications or non-oral dosage forms, granddaughter states does drink a lot of herbal teas.     Changes made to PTA medication list:  Added: None  Deleted: None  Changed: None    Allergies reviewed with patient and updates made in EHR: yes    Medication History Completed By: YOHANNES MONZON [facility] 2/11/2024 10:46 AM    PTA Med List   Medication Sig Last Dose    albuterol (PROAIR HFA/PROVENTIL HFA/VENTOLIN HFA) 108 (90 Base) MCG/ACT inhaler Inhale 2 puffs into the lungs every 6 hours as needed for shortness of breath, wheezing or cough 2/10/2024 at AM    atorvastatin (LIPITOR) 40 MG tablet TAKE 1 PILL BY MOUTH EVERY DAY/TXHUA HNUB NOJ 1 LUB TSHUAJ PAB NTSHAV MUAJ ROJ Past Month    levothyroxine (SYNTHROID/LEVOTHROID) 75 MCG tablet Take 0.5 tablets (37.5 mcg) by mouth daily Past Month    lisinopril-hydrochlorothiazide (ZESTORETIC) 20-25 MG tablet Take 1 tablet by mouth daily Past Month       "
(0) No abnormality

## 2024-02-11 NOTE — ED PROVIDER NOTES
"EMERGENCY DEPARTMENT ENCOUNTER      NAME: Colin Car  AGE: 89 year old male  YOB: 1935  MRN: [id number]  EVALUATION DATE & TIME: 2/11/2024  8:31 AM    PCP: Allison Belle    ED PROVIDER: Dione Zamarripa M.D.      CHIEF COMPLAINT     Chief Complaint   Patient presents with     Shortness of Breath     Wheezing         FINAL IMPRESSION:     1. Wheezing    2. Pharyngitis, unspecified etiology    3. Hypertension, unspecified type    4. Pancytopenia (H)          MEDICAL DECISION MAKING:       Pertinent Labs & Imaging studies reviewed. (See chart for details)    89 year old male presents to the Emergency Department for evaluation of sob    History  Supplemental history from daughter  External Record(s) review as documented below    Exam  Appears short of breath with minimal movement.  Able to speak in short sentences.  Diffuse expiratory wheezes.  Protuberant abdomen.  None tachycardic.    Differential Diagnosis include but not limited to asthma COPD pneumonia PE congestive heart failure pulmonary edema among others.      Vital Signs: Hypertensive  EKG: Sinus rhythm normal anterior progression normal axis inverted T wave in V6 LVH criteria Q waves inferiorly  Imaging: I independently interpreted CT chest no free air.Formal read by radiologist.  Home meds: reviewed  ED meds: albuterol atrovent, solumedrol, zosyn  Fluids: tko and orally   Labs:  K 3.6  Cr 1.11  Wbc 3.5  Hgb 11.7  platelets 87    Clinical Impression and Decision Making    88 yo male presents with daughter for sob  History provided via   Patient has a history of \"wheezing\" supposed to have PFT but hasn't  Not compliant with medications per daughter  URI sx for 2 weeks  But now cough worse sob and sore throat    On exam non toxic  Audible wheezes  Horse voice  No tripoding  No drooling  Able to speak in full sentences  porterior pharynx erythema uvula midline    IV monitors  Albuterol atrovent solumedrol    CT neck no abscess   D/w ENT " who reviewed images  No surgical or concerning findings  Recommends decadron 10 mg IV    CT chest no PE  No consolidation    Sepsis trigger given tachycardia and leukopenia  Pan cultured   Started on zosyn    Labs leukopenia, anemia and thrombocytopenia  All acute except mild thrombocytopenia before    EKG sinus no ST elevation    Wheezes improved but still present  Patient able to sleep laying flat but desaturates only when sleeping given supplemental oxygen  Tolerated po without difficulty    Admitted for pharyngitis and reactive airway disease      In addition to the work out documented, I considered the following work up UA no complaints of urinary infection           Medical Decision Making    History:  ? Supplemental history from: Other: Patient's granddaughter  ? External Record(s) reviewed: Documented in chart    Work Up:  ? Chart documentation includes differential considered and any EKGs or imaging independently interpreted by provider, where specified.  ? In additional to work up documented, I considered the following work up: Documented in chart, if applicable.    External consultation:  ? Discussion of management with another provider: Hospitalist    Complicating factors:  ? Care impacted by chronic illness: Hyperlipidemia, Hypertension, Mental Health, and Other: hypothyroidism  ? Care affected by social determinants of health: Medication Noncompliance    Disposition considerations: Admit.      Review of External Records  Hospital/Clinic: [facility]   Date: 12/04/2023  Hypothyroidism, hypertension, and pulmonary nodules.    External Consultation  Dr. Marlow, hospitalist.   ENT    ED COURSE     8:35 AM I met with the patient, obtained history, performed an initial exam, and discussed options and plan for diagnostics and treatment here in the ED.    10:13 AM I met with patient again to obtain additional history with a  [employer] phone  on line.    12:53 PM Reevaluated and updated.    12:59 MP I  spoke with Dr. Marlow, hospitalist to admit patient.    1:20 PM I spoke with ENT. Reviewed CT  No abscess  Decadron 10 mg iv  Patient already received solumedrol    At the conclusion of the encounter I discussed the results of all of the tests and the disposition. The questions were answered. The patient and patient's Granddaughter acknowledged understanding and was agreeable with the care plan.       MEDICATIONS GIVEN IN THE EMERGENCY:     Medications   albuterol (PROVENTIL) neb solution 2.5 mg (has no administration in time range)   atorvastatin (LIPITOR) tablet 40 mg (has no administration in time range)   levothyroxine (SYNTHROID/LEVOTHROID) half-tab 37.5 mcg (has no administration in time range)   lisinopril-hydrochlorothiazide (ZESTORETIC) 20-25 MG per tablet 1 tablet (1 tablet Oral $Given 2/11/24 1541)   cefTRIAXone (ROCEPHIN) 1 g vial to attach to  mL bag for ADULTS or NS 50 mL bag for PEDS (0 g Intravenous Stopped 2/11/24 1621)   lidocaine 1 % 0.1-1 mL (has no administration in time range)   lidocaine (LMX4) cream (has no administration in time range)   sodium chloride (PF) 0.9% PF flush 3 mL (has no administration in time range)   sodium chloride (PF) 0.9% PF flush 3 mL (3 mLs Intracatheter $Given 2/11/24 1420)   albuterol (PROVENTIL) neb solution 2.5 mg (has no administration in time range)   ipratropium - albuterol 0.5 mg/2.5 mg/3 mL (DUONEB) neb solution 3 mL (3 mLs Nebulization $Given 2/11/24 1442)   acetaminophen (TYLENOL) tablet 650 mg (has no administration in time range)   acetaminophen (TYLENOL) Suppository 650 mg (has no administration in time range)   methylPREDNISolone sodium succinate (SOLU-MEDROL) injection 40 mg (has no administration in time range)   guaiFENesin (MUCINEX) 12 hr tablet 600 mg (600 mg Oral $Given 2/11/24 1541)   ondansetron (ZOFRAN ODT) ODT tab 4 mg (has no administration in time range)     Or   ondansetron (ZOFRAN) injection 4 mg (has no administration in time range)  "  hydrALAZINE (APRESOLINE) tablet 10 mg (has no administration in time range)     Or   hydrALAZINE (APRESOLINE) injection 10 mg (has no administration in time range)   melatonin tablet 1 mg (has no administration in time range)   prochlorperazine (COMPAZINE) injection 5 mg (has no administration in time range)     Or   prochlorperazine (COMPAZINE) tablet 5 mg (has no administration in time range)     Or   prochlorperazine (COMPAZINE) suppository 12.5 mg (has no administration in time range)   ipratropium - albuterol 0.5 mg/2.5 mg/3 mL (DUONEB) neb solution 3 mL (3 mLs Nebulization $Given 2/11/24 0843)   ipratropium - albuterol 0.5 mg/2.5 mg/3 mL (DUONEB) neb solution 3 mL (3 mLs Nebulization $Given 2/11/24 0916)   methylPREDNISolone sodium succinate (solu-MEDROL) injection 125 mg (125 mg Intravenous $Given 2/11/24 0916)   iopamidol (ISOVUE-370) solution 90 mL (90 mLs Intravenous $Given 2/11/24 1204)       NEW PRESCRIPTIONS STARTED AT TODAY'S ER VISIT     Current Discharge Medication List           =================================================================    HPI     Patient information was obtained from: Patient    Use of :  Yes ( Phone) - Language - ong    Shaw Lou Car is a 89 year old male who presents by private car with  for evaluation of shortness of breath.    Patient is accompanied with his granddaughter who interpreted in [facility]. She reports that he has been having worsening shortness of breath since Friday 2/09/24 (~2 days ago). He also has had a productive sounding cough but he has not been able to cough up sputum as well as shortness of breath. Endorsed having a runny nose.He has also not been taking any of his daily medications because \"he does not want to\".    Per patient, he has had a sore throat, cough, shortness of breath that got worse today (2/11/24).     Per his daughter, patient normally has an audible wheeze.    She has not had any falls, travels, trouble swallowing, " chest pain, abdominal pain, vomiting, diarrhea, dysuria, or blood in stools. Patient was admitted from 11/01/23 to 11/04/2023. Not had any history of asthma but he has COPD. Patient was previous smoker. No other complaints at this time.      REVIEW OF SYSTEMS   Review of Systems   HENT:  Positive for rhinorrhea.    Respiratory:  Positive for cough (productive sounding, no sputum coughed up) and shortness of breath.    Cardiovascular:  Negative for chest pain.   Gastrointestinal:  Negative for abdominal pain, blood in stool, diarrhea and vomiting.   Genitourinary:  Negative for dysuria.        PAST MEDICAL HISTORY:     Past Medical History:   Diagnosis Date     Hyperlipidemia      Hypertension      Hypothyroidism      Major depressive disorder, recurrent episode, mild (H24)     Created by Conversion        PAST SURGICAL HISTORY:     Past Surgical History:   Procedure Laterality Date     ENDOSCOPIC RETROGRADE CHOLANGIOPANCREATOGRAM N/A 6/2/2018    Procedure: ENDOSCOPIC RETROGRADE CHOLANGIOPANCREATOGRAPHY; SPHINCTEROTOMY AND STONE EXTRACTION;  Surgeon: Juve Shannon MD;  Location: [facility];  Service:      LAPAROSCOPIC CHOLECYSTECTOMY N/A 6/1/2018    Procedure: CHOLECYSTECTOMY, LAPAROSCOPIC, COMMON DUCT EXPLORATION;  Surgeon: Meir Beck MD;  Location: [facility];  Service:      NO PAST SURGERIES           CURRENT MEDICATIONS:   No current outpatient medications on file.       ALLERGIES:   No Known Allergies    FAMILY HISTORY:     Family History   Problem Relation Age of Onset     No Known Problems Mother      No Known Problems Father      No Known Problems Brother      No Known Problems Brother        SOCIAL HISTORY:     Social History     Socioeconomic History     Marital status: Single   Tobacco Use     Smoking status: Never     Smokeless tobacco: Never   Vaping Use     Vaping Use: Never used   Substance and Sexual Activity     Alcohol use: No     Drug use: No   Social History Narrative     He lives with his son, daughter-in-law, and their children.  He is .  He has 3 children total. - Dr. Brown 01/08/21        VITALS:   BP (!) 171/99   Pulse 101   Temp 97.7  F (36.5  C) (Oral)   Resp 22   Ht 1.524 m (5')   Wt 72.3 kg (159 lb 6.3 oz)   SpO2 95%   BMI 31.13 kg/m      PHYSICAL EXAM     Physical Exam  Vitals and nursing note reviewed.   Constitutional:       General: He is not in acute distress.     Appearance: He is obese. He is ill-appearing. He is not toxic-appearing or diaphoretic.   HENT:      Mouth/Throat:      Mouth: Mucous membranes are dry. Angioedema present.      Tongue: Tongue deviates from midline.      Pharynx: Uvula midline. Oropharyngeal exudate and posterior oropharyngeal erythema present. No pharyngeal swelling or uvula swelling.      Tonsils: No tonsillar exudate or tonsillar abscesses. 1+ on the right. 1+ on the left.   Pulmonary:      Effort: Tachypnea present. No accessory muscle usage.      Breath sounds: Examination of the right-upper field reveals wheezing. Examination of the left-upper field reveals wheezing. Examination of the right-middle field reveals wheezing. Examination of the left-middle field reveals wheezing. Examination of the right-lower field reveals wheezing. Examination of the left-lower field reveals wheezing. Wheezing present.   Neurological:      Mental Status: He is alert.         Physical Exam   Constitutional: Nontoxic appearing. Cooperant and pleasant. Appears short of breath.    Head: Atraumatic.     Nose: Nose normal.     Mouth/Throat: Oropharynx is clear dry    Eyes: EOM are normal. Pupils are equal, round, and reactive to light.     Ears: Bilateral pearly white tympanic membranes.    Neck: Normal range of motion. Neck supple.     Cardiovascular: Normal rate, regular rhythm and normal heart sounds.      Pulmonary/Chest:  Bilateral expiratory wheezes    Abdominal: soft nontender.    Musculoskeletal: Normal range of motion.     Neurological: Moves  upper and lower extremities equally.    Lymphatics: no edema    : NA    Skin: Skin is warm and dry.     Psychiatric: Normal mood and affect. Behavior is normal.       LAB:     All pertinent labs reviewed and interpreted.  Labs Ordered and Resulted from Time of ED Arrival to Time of ED Departure   TROPONIN T, HIGH SENSITIVITY - Abnormal       Result Value    Troponin T, High Sensitivity 39 (*)    BASIC METABOLIC PANEL - Abnormal    Sodium 143      Potassium 3.6      Chloride 104      Carbon Dioxide (CO2) 27      Anion Gap 12      Urea Nitrogen 15.3      Creatinine 1.11      GFR Estimate 64      Calcium 8.3 (*)     Glucose 120 (*)    CBC WITH PLATELETS AND DIFFERENTIAL - Abnormal    WBC Count 3.5 (*)     RBC Count 3.64 (*)     Hemoglobin 11.7 (*)     Hematocrit 35.4 (*)     MCV 97      MCH 32.1      MCHC 33.1      RDW 13.5      Platelet Count 87 (*)     % Neutrophils 41      % Lymphocytes 42      % Monocytes 13      % Eosinophils 3      % Basophils 1      % Immature Granulocytes 0      NRBCs per 100 WBC 1 (*)     Absolute Neutrophils 1.4 (*)     Absolute Lymphocytes 1.5      Absolute Monocytes 0.4      Absolute Eosinophils 0.1      Absolute Basophils 0.0      Absolute Immature Granulocytes 0.0      Absolute NRBCs 0.0     INFLUENZA A/B, RSV, & SARS-COV2 PCR - Normal    Influenza A PCR Negative      Influenza B PCR Negative      RSV PCR Negative      SARS CoV2 PCR Negative     NT PROBNP INPATIENT - Normal    N terminal Pro BNP Inpatient 339     TROPONIN T, HIGH SENSITIVITY - Normal    Troponin T, High Sensitivity 13     LACTIC ACID WHOLE BLOOD - Normal    Lactic Acid 2.0     TSH WITH FREE T4 REFLEX - Normal    TSH 2.80     GROUP A STREPTOCOCCUS PCR THROAT SWAB - Normal    Group A strep by PCR Not Detected     BLOOD CULTURE   BLOOD CULTURE        RADIOLOGY:     Reviewed all pertinent imaging. Please see official radiology report.  Soft tissue neck CT w contrast   Final Result   IMPRESSION:    1.  Nonspecific mild  diffuse mucosal thickening involving the supraglottic larynx and mild prominence of the palatine tonsils. This may be infectious/inflammatory in nature. Mild associated airway narrowing without complete airway obstruction.   2.  No discrete mucosal mass or evidence for abscess.   3.  No pathologic cervical lymph nodes.      CT Chest Pulmonary Embolism w Contrast   Final Result   IMPRESSION:   1.  Slight mosaic appearance to the lungs noted. This reflects small airway disease. No evidence of pneumonia or failure.   2.  Mild chronic atelectasis inferiorly in the right middle lobe has decreased in the interval.   3.  Stable 4.5 cm ascending aortic aneurysm.   4.  No evidence of pulmonary emboli. Main pulmonary artery is enlarged consistent with pulmonary arterial hypertension.   5.  Other noncritical findings as noted above.           EKG:     EKG #1  Sinus rhythm criteria for LVH normal anterior progression normal axis Q waves inferiorly inverted T wave in 3 inverted T wave in V6  Long QTc of 563    Time:[id number]    Ventricular rate 74 bmp  Axis normal  NV interval 170 ms  QRS duration 92 ms  QT//451 ms    Compared to previous EKG on November 1, 2020 23rd sinus rhythm inverted T wave in lead III was seen before Q waves inferiorly seen before inverted T wave in V6 is new.  Incomplete right bundle branch pattern and LVH seen before.  I have independently reviewed and interpreted the EKG(s) documented above.      PROCEDURES:     Procedures      I, Onofre Santana , am serving as a scribe to document services personally performed by Dr. Zamarripa based on my observation and the provider's statements to me. I, Dione Zamarripa MD attest that Onofre Santana  is acting in a scribe capacity, has observed my performance of the services and has documented them in accordance with my direction.    Dione Zamarripa M.D.  Emergency Medicine  [facility] EMERGENCY DEPARTMENT  [address]  [facility] [id number]  [phone]  Dept: [phone]       Dione Zamarripa MD  02/11/24 8075

## 2024-02-11 NOTE — H&P
[facility]    History and Physical - Hospitalist Service       Date of Admission:  2/11/2024    Assessment & Plan      Colin Car is a 89 year old male admitted on 2/11/2024. He has known history of reactive airway disease without formal diagnosis of asthma versus COPD since patient did not comply with pulmonary function test who presented with 2 to 3-day of wheezing and cough.  Has had runny nose for a week prior.  He was hypoxic in the ER with oxygen level in the 80s which improved with supplemental oxygen by nasal cannula.  He was given steroids and nebs with 1 dose of Zosyn.  CT of the chest and soft tissue of the neck were reviewed.  Patient is able to swallow without difficulty and has not drooling therefore low suspicion for epiglottitis.  Will continue IV steroids, nebs, and IV Rocephin.  Given nonadherence to medication, will start home medications for accelerated hypertension.      Acute respiratory insufficiency likely due to reactive airway disease exacerbation  --Patient does not carry a formal diagnosis of COPD or asthma since he did not undergo pulmonary function test but has had similar presentations in the past.  Given upper respiratory infection which is likely viral, I suspect that IV steroids and nebs along with antibiotics should help in his condition.  -Continue supplemental oxygen  -Ordered Mucinex 600 mg twice daily for cough    Abnormal CT of the neck  --Likely inflammation from upper respiratory tract  --Low suspicion for epiglottitis since patient is able to swallow and eat his food in front of me without any difficulty  -I did not appreciate any stridor  --Given that patient will be on IV steroids I think that should suffice for inflammation  --Streptococcal antigen is negative    Accelerated hypertension likely due to nonadherence to medication  -Restart home medicines-when med rec is done    Nonadherence to medication  --Patient counseled regarding the  same  TSH   Date Value Ref Range Status   02/11/2024 2.80 0.30 - 4.20 uIU/mL Final   05/06/2022 4.03 0.30 - 5.00 uIU/mL Final     Hypothyroidism  -- TSH is normal as above  -- Continue home medications    Hyperlipidemia  -- Continue home medication       Observation Goals: List all goals to be met before discharge home:, - Improvement of Peak Flow to greater than 70% sustained off nebulizer for 4 hours., - Dyspnea improved and oxygen saturations greater than 88% on room air or prior home oxygen levels, - Vitals signs normal or at patient baseline, - Safe disposition plan has been identified, - Nurse to notify provider when observation goals have been met and patient is ready for discharge.  Diet: Regular Diet Adult  DVT Prophylaxis: Low Risk/Ambulatory with no VTE prophylaxis indicated  Logan Catheter: Not present  Lines: None     Cardiac Monitoring: None  Code Status: Full Code    Clinically Significant Risk Factors Present on Admission                # Thrombocytopenia: Lowest platelets = 87 in last 2 days, will monitor for bleeding   # Hypertension: Noted on problem list   # Acute Respiratory Failure: Documented O2 saturation < 91%.  Continue supplemental oxygen as needed     # Obesity: Estimated body mass index is 31.52 kg/m  as calculated from the following:    Height as of this encounter: 1.524 m (5').    Weight as of this encounter: 73.2 kg (161 lb 6.4 oz).              Disposition Plan      Expected Discharge Date: 02/12/2024                  Olman Schwartz MD  Hospitalist Service  [facility]  Securely message with [employer] (more info)  Text page via [employer] Paging/Directory     ______________________________________________________________________    Chief Complaint   Coughing and wheezing ongoing for the last 2 to 3 days but worse in the last 1 day.    History is obtained from the patient.  [facility]  used.    History of Present Illness   Colin Car is a 89 year old male  with history of hypertension, nonadherence to medication, reactive airway disease, hypothyroidism who has been having runny nose for the past 1 week and wheezing for the past 2 to 3 days.  He has been coughing and cannot able to hold his breath.  He stopped taking his BP meds a week back.  In the ER he presented with a blood pressure of 200 and oxygen level in low 80s.  He was put on oxygen by nasal cannula which increased his oxygen level to about 90.  CT of the chest was negative for PE.  Had a soft tissue of the neck to CT which showed inflammation in the pharynx.  Patient denies any difficulty swallowing.  He has no stridor and is able to eat his lunch in front of me without any difficulty.  No drooling.  Did receive IV Solu-Medrol in the ER.      Past Medical History    Past Medical History:   Diagnosis Date    Hyperlipidemia     Hypertension     Hypothyroidism     Major depressive disorder, recurrent episode, mild (H24)     Created by Conversion        Past Surgical History   Past Surgical History:   Procedure Laterality Date    ENDOSCOPIC RETROGRADE CHOLANGIOPANCREATOGRAM N/A 6/2/2018    Procedure: ENDOSCOPIC RETROGRADE CHOLANGIOPANCREATOGRAPHY; SPHINCTEROTOMY AND STONE EXTRACTION;  Surgeon: Juve Shannon MD;  Location: [facility] [place];  Service:     LAPAROSCOPIC CHOLECYSTECTOMY N/A 6/1/2018    Procedure: CHOLECYSTECTOMY, LAPAROSCOPIC, COMMON DUCT EXPLORATION;  Surgeon: Meir Beck MD;  Location: [facility] [place];  Service:     NO PAST SURGERIES         Prior to Admission Medications   Prior to Admission Medications   Prescriptions Last Dose Informant Patient Reported? Taking?   albuterol (PROAIR HFA/PROVENTIL HFA/VENTOLIN HFA) 108 (90 Base) MCG/ACT inhaler 2/10/2024 at AM  No Yes   Sig: Inhale 2 puffs into the lungs every 6 hours as needed for shortness of breath, wheezing or cough   atorvastatin (LIPITOR) 40 MG tablet Past Month Self No Yes   Sig: TAKE 1 PILL BY MOUTH EVERY DAY/NORMA LUNDBERG  1 LUB TSHUAJ PAB NTSHAV MUAJ ROJ   levothyroxine (SYNTHROID/LEVOTHROID) 75 MCG tablet Past Month  No Yes   Sig: Take 0.5 tablets (37.5 mcg) by mouth daily   lisinopril-hydrochlorothiazide (ZESTORETIC) 20-25 MG tablet Past Month Self Yes Yes   Sig: Take 1 tablet by mouth daily      Facility-Administered Medications: None        Social History   I have reviewed this patient's social history and updated it with pertinent information if needed.  Social History     Tobacco Use    Smoking status: Never    Smokeless tobacco: Never   Vaping Use    Vaping Use: Never used   Substance Use Topics    Alcohol use: No    Drug use: No        Physical Exam   Vital Signs: Temp: 97.7  F (36.5  C) Temp src: Temporal BP: (!) 171/98 Pulse: 83   Resp: (!) 34 SpO2: 96 % O2 Device: Nasal cannula Oxygen Delivery: 3 LPM  Weight: 161 lbs 6.4 oz    Morbidly obese with thick neck  Bilateral wheezing with scattered coarse crackles  Heart sounds were difficult to hear and patient was not able to cooperate by holding his breath  No leg swelling  Abdomen is soft  Posterior pharynx is injected with no tonsillar abscess appreciated    Medical Decision Making       75 MINUTES SPENT BY ME on the date of service doing chart review, history, exam, documentation & further activities per the note.  MANAGEMENT DISCUSSED with the following over the past 24 hours: Patient and his wife   NOTE(S)/MEDICAL RECORDS REVIEWED over the past 24 hours: ER notes and previous discharge summary       Data     I have personally reviewed the following data over the past 24 hrs:    3.5 (L)  \   11.7 (L)   / 87 (L)     143 104 15.3 /  120 (H)   3.6 27 1.11 \     Trop: 13 BNP: 339     TSH: 2.80 T4: N/A A1C: N/A       Imaging results reviewed over the past 24 hrs:   Recent Results (from the past 24 hour(s))   CT Chest Pulmonary Embolism w Contrast    Narrative    EXAM: CT CHEST PULMONARY EMBOLISM W CONTRAST  LOCATION: [facility]  DATE:  2/11/2024    INDICATION: Shortness of breath    COMPARISON: High-resolution chest CT 11/22/2023, CT CA 06/10/2022 TECHNIQUE: CT chest pulmonary angiogram during arterial phase injection of IV contrast. Multiplanar reformats and MIP reconstructions were performed. Dose reduction techniques were used.   CONTRAST: [id number] 90ml    FINDINGS:  ANGIOGRAM CHEST: Excellent opacification of pulmonary arteries and branches. No evidence of pulmonary emboli.  Main pulmonary artery is distended to 3.4 cm. Ascending aorta is aneurysmal at 4.5 cm, unchanged. No dissection.      LUNGS AND PLEURA: Decreased chronic atelectasis in the inferior right middle lobe abutting the fissure. Slight mosaic appearance to the lung bases. Linear scarring laterally in the left upper lobe is unchanged. No effusions.    MEDIASTINUM/AXILLAE: Stable right precarinal and AP nodes measuring up to a centimeter.    CORONARY ARTERY CALCIFICATION: Moderate.    UPPER ABDOMEN: Reflux of contrast into the intrahepatic IVC. Postcholecystectomy distention of the common bile duct stable. Multiple bilateral renal cysts again noted, quite large on the left. These need no follow-up.    MUSCULOSKELETAL: Unremarkable.      Impression    IMPRESSION:  1.  Slight mosaic appearance to the lungs noted. This reflects small airway disease. No evidence of pneumonia or failure.  2.  Mild chronic atelectasis inferiorly in the right middle lobe has decreased in the interval.  3.  Stable 4.5 cm ascending aortic aneurysm.  4.  No evidence of pulmonary emboli. Main pulmonary artery is enlarged consistent with pulmonary arterial hypertension.  5.  Other noncritical findings as noted above.   Soft tissue neck CT w contrast    Narrative    EXAM: CT SOFT TISSUE NECK W CONTRAST  LOCATION: [facility]  DATE: 2/11/2024    INDICATION: Stridor.  evaluate for abscess, known epiglottis  COMPARISON: None.  CONTRAST: [id number] 90ml  TECHNIQUE: Routine CT Soft Tissue  Neck with IV contrast. Multiplanar reformats. Dose reduction techniques were used.    FINDINGS:     MUCOSAL SPACES/SOFT TISSUES: Minor symmetrical prominence of the palatine tonsils with low-grade narrowing of the oropharyngeal airway at the level the soft palate. No discrete mucosal mass or localized fluid collection to suggest abscess. Additional   nonspecific mild mucosal thickening involving the epiglottis and supraglottic larynx with narrowing of the supraglottic airway, but without complete airway effacement. Again, no discrete mucosal mass or localized fluid collection identified. Grossly   unremarkable parapharyngeal and subcutaneous soft tissues. Diffuse dental decay. Unremarkable floor of mouth structures.    LYMPH NODES: Scattered subcentimeter cervical lymph nodes, more numerous on the left than the right. None are frankly pathologic by size or morphology criteria.     SALIVARY GLANDS: Normal parotid and submandibular glands.    THYROID: Calcification right superior thyroid lobe.     VESSELS: Mild to moderate atherosclerotic plaque at the carotid bifurcations.    VISUALIZED INTRACRANIAL/ORBITS/SINUSES: Senescent changes of the included intracranial compartment. No acute intraorbital process. Paranasal sinuses and mastoid air cells are clear.    OTHER: Diffuse cervical spondylosis. Left apical interlobular septal thickening. See separate dedicated chest CT.      Impression    IMPRESSION:   1.  Nonspecific mild diffuse mucosal thickening involving the supraglottic larynx and mild prominence of the palatine tonsils. This may be infectious/inflammatory in nature. Mild associated airway narrowing without complete airway obstruction.  2.  No discrete mucosal mass or evidence for abscess.  3.  No pathologic cervical lymph nodes.

## 2024-02-12 ENCOUNTER — PATIENT OUTREACH (OUTPATIENT)
Dept: GERIATRIC MEDICINE | Facility: CLINIC | Age: 89
End: 2024-02-12
Payer: COMMERCIAL

## 2024-02-12 VITALS
BODY MASS INDEX: 31.29 KG/M2 | TEMPERATURE: 98.4 F | HEIGHT: 60 IN | SYSTOLIC BLOOD PRESSURE: 144 MMHG | HEART RATE: 83 BPM | RESPIRATION RATE: 19 BRPM | WEIGHT: 159.39 LBS | DIASTOLIC BLOOD PRESSURE: 86 MMHG | OXYGEN SATURATION: 94 %

## 2024-02-12 PROCEDURE — 94640 AIRWAY INHALATION TREATMENT: CPT | Mod: 76

## 2024-02-12 PROCEDURE — 250N000013 HC RX MED GY IP 250 OP 250 PS 637: Performed by: INTERNAL MEDICINE

## 2024-02-12 PROCEDURE — 99239 HOSP IP/OBS DSCHRG MGMT >30: CPT | Performed by: FAMILY MEDICINE

## 2024-02-12 PROCEDURE — 96376 TX/PRO/DX INJ SAME DRUG ADON: CPT

## 2024-02-12 PROCEDURE — 250N000011 HC RX IP 250 OP 636: Performed by: INTERNAL MEDICINE

## 2024-02-12 PROCEDURE — 250N000009 HC RX 250: Performed by: INTERNAL MEDICINE

## 2024-02-12 PROCEDURE — 999N000157 HC STATISTIC RCP TIME EA 10 MIN

## 2024-02-12 PROCEDURE — G0378 HOSPITAL OBSERVATION PER HR: HCPCS

## 2024-02-12 RX ORDER — IPRATROPIUM BROMIDE AND ALBUTEROL SULFATE 2.5; .5 MG/3ML; MG/3ML
3 SOLUTION RESPIRATORY (INHALATION) 3 TIMES DAILY
Qty: 90 ML | Refills: 0 | Status: SHIPPED | OUTPATIENT
Start: 2024-02-12 | End: 2024-02-22

## 2024-02-12 RX ORDER — DOXYCYCLINE 100 MG/1
100 CAPSULE ORAL 2 TIMES DAILY
Qty: 8 CAPSULE | Refills: 0 | Status: SHIPPED | OUTPATIENT
Start: 2024-02-12 | End: 2024-02-16

## 2024-02-12 RX ORDER — PREDNISONE 20 MG/1
40 TABLET ORAL DAILY
Qty: 6 TABLET | Refills: 0 | Status: SHIPPED | OUTPATIENT
Start: 2024-02-12 | End: 2024-02-15

## 2024-02-12 RX ADMIN — CEFTRIAXONE SODIUM 1 G: 1 INJECTION, POWDER, FOR SOLUTION INTRAMUSCULAR; INTRAVENOUS at 11:59

## 2024-02-12 RX ADMIN — GUAIFENESIN 600 MG: 600 TABLET ORAL at 08:40

## 2024-02-12 RX ADMIN — LEVOTHYROXINE SODIUM 37.5 MCG: 0.03 TABLET ORAL at 08:41

## 2024-02-12 RX ADMIN — IPRATROPIUM BROMIDE AND ALBUTEROL SULFATE 3 ML: .5; 3 SOLUTION RESPIRATORY (INHALATION) at 08:49

## 2024-02-12 RX ADMIN — LISINOPRIL AND HYDROCHLOROTHIAZIDE 1 TABLET: 25; 20 TABLET ORAL at 08:40

## 2024-02-12 RX ADMIN — METHYLPREDNISOLONE SODIUM SUCCINATE 40 MG: 40 INJECTION, POWDER, FOR SOLUTION INTRAMUSCULAR; INTRAVENOUS at 08:41

## 2024-02-12 ASSESSMENT — ACTIVITIES OF DAILY LIVING (ADL)
ADLS_ACUITY_SCORE: 33

## 2024-02-12 NOTE — PROGRESS NOTES
"PRIMARY DIAGNOSIS: \"GENERIC\" NURSING  OUTPATIENT/OBSERVATION GOALS TO BE MET BEFORE DISCHARGE:  ADLs back to baseline: Yes    Activity and level of assistance: Ambulating independently.    Pain status: Pain free.    Return to near baseline physical activity: Yes     Discharge Planner Nurse   Safe discharge environment identified: Yes  Barriers to discharge: No       Entered by: Janneth Estevez RN 02/12/2024 12:36 PM    Patient had last dose of IV antibiotics prior to discharge.     Patient discharged to home  via Private Car.  Accompanied by daughter and staff.  Discharge instructions were reviewed with patient, opportunity offered to ask questions.    Prescriptions e-prescribed to pharmacy.  Access discontinued: Yes  Care plan and education discontinued: Yes  Belongings were sent home with patient/family:  cell phone and clothings - shirt pants and shoes. Discharge instructions were sent with patient and family.   "

## 2024-02-12 NOTE — PLAN OF CARE
"PRIMARY DIAGNOSIS: \"GENERIC\" NURSING  OUTPATIENT/OBSERVATION GOALS TO BE MET BEFORE DISCHARGE:  ADLs back to baseline: No    Activity and level of assistance: Assist of one with a walker    Pain status: Pain free.    Return to near baseline physical activity: No     Discharge Planner Nurse   Safe discharge environment identified: Yes  Barriers to discharge: Yes       Entered by: Melissa Nowak RN 02/11/2024 8:27 PM     Please review provider order for any additional goals.   Nurse to notify provider when observation goals have been met and patient is ready for discharge.Goal Outcome Evaluation:       Pt is alert  Speak Hmong, and  was utilized. Pt on 3 lit of 02, bed alarm on, bed in low position, commode in the room and call light within reach.                    "

## 2024-02-12 NOTE — PLAN OF CARE
PRIMARY DIAGNOSIS: ACUTE PAIN  OUTPATIENT/OBSERVATION GOALS TO BE MET BEFORE DISCHARGE:  1. Pain Status: Pain free.    2. Return to near baseline physical activity: No    3. Cleared for discharge by consultants (if involved): No    Discharge Planner Nurse   Safe discharge environment identified: Yes  Barriers to discharge: No       Entered by: Cindy Danielle RN 02/12/2024 3:21 AM     Please review provider order for any additional goals.   Nurse to notify provider when observation goals have been met and patient is ready for discharge.Goal Outcome Evaluation:

## 2024-02-12 NOTE — UTILIZATION REVIEW
Concurrent stay review; Secondary Review Determination     Under the authority of the Utilization Management Committee, the utilization review process indicated a secondary review on Colin Car.  The review outcome is based on review of the medical records, discussions with staff, and applying clinical experience noted on the date of the review.        (x) Observation Status Appropriate - Concurrent stay review    RATIONALE FOR DETERMINATION   Concurrent stay review; Secondary Review Determination     Under the authority of the Utilization Management Committee, the utilization review process indicated a secondary review on Colin Car.  The review outcome is based on review of the medical records, discussions with staff, and applying clinical experience noted on the date of the review.        (x) Observation Status Appropriate - Concurrent stay review    RATIONALE FOR DETERMINATION   Colin Car is an 89 yr old male who presented with SOB, wheezing and cough.  IV steroids and abx overnight with oxygen.  This AM weaned off oxygen and discharging on PO abx and steroids per discussion with Dr. Andino.    Patient is clinically improving and there is no clear indication to change patient's status to inpatient. The severity of illness, intensity of service provided, expected LOS and risk for adverse outcome make the care appropriate for observation.    The information on this document is developed by the utilization review team in order for the business office to ensure compliance.  This only denotes the appropriateness of proper admission status and does not reflect the quality of care rendered.         The definitions of Inpatient Status and Observation Status used in making the determination above are those provided in the CMS Coverage Manual, Chapter 1 and Chapter 6, section 70.4.      Sincerely,   Shy Hoang MD  Utilization Review  Physician Advisor  [facility]      Patient is  clinically improving and there is no clear indication to change patient's status to inpatient. The severity of illness, intensity of service provided, expected LOS and risk for adverse outcome make the care appropriate for observation.    The information on this document is developed by the utilization review team in order for the business office to ensure compliance.  This only denotes the appropriateness of proper admission status and does not reflect the quality of care rendered.         The definitions of Inpatient Status and Observation Status used in making the determination above are those provided in the CMS Coverage Manual, Chapter 1 and Chapter 6, section 70.4.      Sincerely,   Shy Hoang MD  Utilization Review  Physician Advisor  [facility]

## 2024-02-12 NOTE — PLAN OF CARE
"PRIMARY DIAGNOSIS: \"GENERIC\" NURSING  OUTPATIENT/OBSERVATION GOALS TO BE MET BEFORE DISCHARGE:  ADLs back to baseline: Yes    Activity and level of assistance: Up with standby assistance.    Pain status: Pain free.    Return to near baseline physical activity: No     Discharge Planner Nurse   Safe discharge environment identified: Yes  Barriers to discharge: No       Entered by: Cindy Danielle RN 02/12/2024 5:53 AM     Please review provider order for any additional goals.   Nurse to notify provider when observation goals have been met and patient is ready for discharge.Goal Outcome Evaluation:        Pt alert, orient x 2, Hmong  used, denied pain, assist of one, bedside commode and urinal used. 3 L O2 nasal cannula. Plan of care ongoing.                 "

## 2024-02-12 NOTE — Clinical Note
CURTIS regarding hospital follow up and 6 month follow up. Let me know if you have any questions or concerns.  Shazia Quezada RN, BSN, PHN [facility] Phone: [phone]

## 2024-02-12 NOTE — PROGRESS NOTES
[facility] Care Coordination Contact      [facility] Six-Month Telephone Assessment    6 month telephone assessment completed on 2/12/2024.    ER visits: No  Hospitalizations: Yes -  [facility]  TCU stays: No  Significant health status changes: None.  Falls/Injuries: No  ADL/IADL changes: No  Changes in services: No    Caregiver Assessment follow up:  Continues to live with family who provides support.     Goals: See POC in chart for goal progress documentation.      Will see member in 6 months for an annual health risk assessment.   Encouraged member to call CC with any questions or concerns in the meantime.     Shazia Quezada RN, BSN, PHN  [facility]  Phone: [phone]

## 2024-02-12 NOTE — PROGRESS NOTES
"PRIMARY DIAGNOSIS: \"GENERIC\" NURSING  OUTPATIENT/OBSERVATION GOALS TO BE MET BEFORE DISCHARGE:  ADLs back to baseline: No    Activity and level of assistance: Up with standby assistance.    Pain status: Improved-controlled with oral pain medications.    Return to near baseline physical activity: No     Discharge Planner Nurse   Safe discharge environment identified: No  Barriers to discharge: Yes       Entered by: Janneth Estevez RN 02/12/2024 10:15 AM     Patient alert and oriented x4. Patient speaks Hmong only,  services used when needed. Patient is no longer on oxygen -  satting at 96. Patient has frequent cough.  Patient is on a regular diet. Patient does not have family in the room. Patient is ambulate with stand by assist in room. Last BP was 145/88 at 0734  "

## 2024-02-12 NOTE — PROGRESS NOTES
TRANSITIONS OF CARE (JOSE JUAN) LOG    JOSE JUAN tasks should be completed by the CC within one (1) business day of notification of each transition. Follow up contact with member is required after return to their usual care setting.  Note:  If CC finds out about the transitions fifteen (15) days or more after the member has returned to their usual care setting, no JOSE JUAN log is needed. However, the CC should check in with the member to discuss the transition process, any changes needed to the care plan and document it in a case note.     Member Name:  Colin Car [facility] [place] Name:  [employer] Member ID#: [id number]   Product: [facility] Care Coordinator Contact:  Shazia Quezada RN, PHN Agency/[facility]Care System: [facility]   Transition Communication Actions from Care Management Contact   Transition #1   Notification Date: 02/12/2024 Transition Date:   02/11/2024 Transition From: Home     Is this the member s usual care setting?               yes Transition To: [facility]   Transition Type:  Unplanned    Documentation from conversation with the member/responsible party, provider, discharging and receiving facility:   Date: 02/12/2024: Received notification of admission to hospital with dx of Acute Bronchitis  CC did not reach out to SW or care team at hospital due to notification came after discharge home.   CC reached out to adult son Robbie  regarding transition and offered support as needed.  Reviewed and update care plan as needed.  Did not notify community service providers due to notification after discharge. Transition log initiated.   PCP, Allison Belle, notified of hospitalization via EMR.     Transition #2   Notification Date: 02/12/2024 Transition Date:   02/012/2024 Transition From: [facility]     Is this the member s usual care setting?               no Transition To: Home   Transition Type:  Planned    Documentation from  conversation with the member/responsible party, provider, discharging and receiving facility:   Date: 02/12/2024: Received notification of transition to home.  CC contacted adult son Robbie  and reviewed discharge summary.  Member has a follow-up appointment with PCP in 7 days: No: Offered Assistance with setting up a follow up appointment , CC will call and schedule appointment with PCP and contact family with date and time.   Member has had a change in condition: No  Home visit needed: No  Care plan reviewed and updated.  The following home based services Adult Day Care PCA were resumed.  New referrals placed: No  Transition log completed.   PCP, Allison Belle, notified of transition back to home via EMR.       *RETURN TO USUAL CARE SETTING: *Complete tasks below when the member is discharging TO their usual care setting within one (1) business day of notification..      For situations where the Care Coordinator is notified of the discharge prior to the date of discharge, the Care Coordinator must follow up with the member or designated representative to confirm that discharge actually occurred and discuss required JOSE JUAN tasks as outlined in the JOSE JUAN Instructions.  (This includes situations where it may be a  new  usual care setting for the member. (i.e., a community member who decides upon permanent nursing home placement following hospitalization and rehab).    Discuss with Member/Responsible Party:    Check  Yes  - if the member, family member and/or SNF/facility staff manages the following:    If  No  provide explanation in the comments section.          Date completed: 02/12/2024 Communicated with member or their designated representative about the following:  care transition process; about changes to the member s health status; plan of care updates; education about transitions and how to prevent unplanned transitions/readmissions    Four Pillars for Optimal Transition:    Check  Yes  - if the member, family member  and/or SNF/facility staff manages the following:    If  No  provide explanation in the comments section.          []  Yes     [x]  No Does the member have a follow-up appointment scheduled with primary care or specialist? (Mental health hospitalizations--the appt. should be w/in 7 days)              For mental health hospitalizations:  []  Yes     []  No     Does the member have a follow-up appointment scheduled with a mental health practitioner within 7 days of discharge?  [x]  Yes     []  No     Has a medication review been completed with member? If no, refer to PCP, home care nurse, MTM, pharmacist  [x]  Yes     []  No     Can the member manage their medications or is there a system in place to manage medications (e.g. home care set-up)?         [x]  Yes     []  No     Can the member verbalize warning signs and symptoms to watch for and how to respond?  [x]  Yes     []  No     Does the member have a copy of and understand their discharge instructions?  If no, assist to obtain copy of discharge instructions, review discharge instructions, and assist to contact PCP to discuss questions about their recent hospitalization.  [x]  Yes     []  No     Does the member have adequate food, housing and transportation?  If no, add goal and discuss additional supports available to the member                                                                                                                                                                                 [x]  Yes     []  No     Is the member safe in their home?  If no, document needs and support provided                                                                                                                                                                          []  Yes     [x]  No     Are there any concerns of vulnerability, abuse, or neglect?  If yes, document concerns and actions taken by Care Coordinator as a mandated                                                                                                                                                                               [x]  Yes     []  No     Does the member use a Personal Health Care Record?  Check  Yes  if visit summary, discharge summary, and/or healthcare summary are being used as a PHR.                                                                                                                                                                                  [x]  Yes     []  No     Have you reviewed the discharge summary with the member? If  No  provide explanation in comments.  [x]  Yes     []  No     Have you updated the member s care plan/support plan? Add new diagnosis, medications, treatments, goals & interventions, as applicable. If No, provide explanation in comments.    Comments:           Notes from conversation with the member/responsible party, provider, discharging and receiving facility (as applicable):           Shazia Quezada RN, BSN, PHN  [facility]  Phone: [phone]

## 2024-02-13 NOTE — DISCHARGE SUMMARY
[facility]  Hospitalist Discharge Summary      Date of Admission:  2/11/2024  Date of Discharge:  2/12/2024  1:07 PM  Discharging Provider: Danuta Andino MD  Discharge Service: Hospitalist Service    Discharge Diagnoses     Acute bronchitis  Stridor with abnormal neck CT.  Strep negative.  Suspect sequelae of viral illness.  Recurrent wheezing; suspect COPD versus Asthma but patient has declined formal PFT testing  Acute resp insufficiency, resolved  Hypertension  Hypothyroidism    Clinically Significant Risk Factors     # Obesity: Estimated body mass index is 31.13 kg/m  as calculated from the following:    Height as of this encounter: 1.524 m (5').    Weight as of this encounter: 72.3 kg (159 lb 6.3 oz).       Follow-ups Needed After Discharge   Follow-up Appointments     Follow-up and recommended labs and tests       Follow up with primary care provider, Allison Belle, within 7 days to   evaluate medication change, to evaluate treatment change, and for hospital   follow- up.  The following labs/tests are recommended: We still recommend   having formal PFT done to see if you would benefit from other breathing   treatments to help you get sick less and stay out of the hospital.  Let Allison Belle know if you feel the nebs are helping and how often to do   them if they are when you are not sick          Unresulted Labs Ordered in the Past 30 Days of this Admission       Date and Time Order Name Status Description    2/11/2024 12:54 PM Blood Culture Peripheral Blood Preliminary     2/11/2024 12:54 PM Blood Culture Line, venous Preliminary           Discharge Disposition   Discharged to home  Condition at discharge: Stable    Hospital Course      is an 89-year-old male with a history of suspected reactive airway disease but declined formal PFT testing and would not go to appointment according to granddaughter who came into the emergency department for further workup and evaluation of 2 to 3-day  history of wheezing and cough accompanied by runny nose.  Was found to be hypoxic in the emergency room department with concerns for stridor.  CT chest negative but CT neck did show inflammation suspected from upper respiratory tract.  No clear pneumonia.  Patient mated to hospital where he was treated with IV steroids and  antibiotics and improved.  Strep testing negative.  Remained stable off of oxygen with decrease in stridor and no wheezing on exam.  Patient very fixated on going home.  Was able to ambulate at baseline with his usual PCA services.  Discussed case with his granddaughter in regards to concerns for recurrent wheezing.  She again confirmed that he will not do PFTs.  We discussed whether or not nebs would help him as he cannot do MDI technique.  Will trial him on nebs, finish short course doxycycline and steroid and he will assess their efficacy with primary care.  Again encourage formal PFTs.  Discharged home in good condition.       Consultations This Hospital Stay   CARE MANAGEMENT / SOCIAL WORK IP CONSULT    Code Status   Prior    Time Spent on this Encounter   I, Danuta Andino MD, personally saw the patient today and spent greater than 30 minutes discharging this patient.       Danuta Andino MD  [facility] EXTENDED RECOVERY AND SHORT STAY  [address] [place] [id number]  Phone: [phone]  Fax: [phone]  ______________________________________________________________________    Physical Exam   Vital Signs: Temp: 98.4  F (36.9  C) Temp src: Oral BP: (!) 144/86     Resp: 19 SpO2: 94 % O2 Device: None (Room air)    Weight: 159 lbs 6.28 oz  General Appearance: Pleasant male in no apparent distress.  No obvious stridor or audible wheezing  Respiratory: Fairly clear to auscultation, no significant wheezing or crackles or rhonchi or stridor  Cardiovascular: Sounds regular, no significant murmurs  GI: Obese soft and nontender  Skin: Significant lower extremity  edema  Other: Neurologically gross intact without focal deficits appreciated       Primary Care Physician   Allison Belle    Discharge Orders      Reason for your hospital stay    Wheezing with laryngitis - suspect underlying COPD/Asthma with exacerbation     Follow-up and recommended labs and tests     Follow up with primary care provider, Allison Belle, within 7 days to evaluate medication change, to evaluate treatment change, and for hospital follow- up.  The following labs/tests are recommended: We still recommend having formal PFT done to see if you would benefit from other breathing treatments to help you get sick less and stay out of the hospital.  Let Allison Belle know if you feel the nebs are helping and how often to do them if they are when you are not sick     Activity    Your activity upon discharge: activity as tolerated  Resume PCA services     Nebulizer and Nebulizer Supplies    Nebulizer Documentation  I attest that I have seen and evaluated Colin Car. He has a diagnosis of J20.9 - Acute bronchitis, unspecified and a nebulizer machine is needed to administer medication to improve breathing passages.     I, the undersigned, certify that the above prescribed supplies are medically necessary for this patient and is both reasonable and necessary in reference to accepted standards of medical and necessary in reference to accepted standards of medical practice in the treatment of this patient's condition and is not prescribed as a convenience.     Diet    Follow this diet upon discharge: Orders Placed This Encounter      Regular Diet Adult       Significant Results and Procedures   Most Recent 3 CBC's:  Recent Labs   Lab Test 02/11/24  1009 11/04/23  0610 11/03/23  0618   WBC 3.5* 8.7 10.2   HGB 11.7* 16.7 17.4   MCV 97 97 96   PLT 87* 159 128*     Most Recent 3 BMP's:  Recent Labs   Lab Test 02/11/24  1009 11/04/23  0610 11/03/23  0618    137 136   POTASSIUM 3.6 3.7 3.5   CHLORIDE 104 102 100   CO2 27  25 24   BUN 15.3 22.5 17.3   CR 1.11 1.44* 1.29*   ANIONGAP 12 10 12   GEENA 8.3* 8.3* 8.3*   * 180* 120*     7-Day Micro Results       Collected Updated Procedure Result Status      02/11/2024 1420 02/12/2024 1732 Blood Culture Line, venous [[id number]]   Blood from Line, venous    Preliminary result Component Value   Culture No growth after 1 day  [P]                02/11/2024 1420 02/12/2024 1732 Blood Culture Peripheral Blood [[id number]]   Peripheral Blood    Preliminary result Component Value   Culture No growth after 1 day  [P]                02/11/2024 1138 02/11/2024 1224 Group A Streptococcus PCR Throat Swab [[id number]]    Swab from Throat    Final result Component Value   Group A strep by PCR Not Detected            02/11/2024 0859 02/11/2024 0951 Symptomatic Influenza A/B, RSV, & SARS-CoV2 PCR (COVID-19) Nasopharyngeal [[id number]]    Swab from Nasopharyngeal    Final result Component Value   Influenza A PCR Negative   Influenza B PCR Negative   RSV PCR Negative   SARS CoV2 PCR Negative   NEGATIVE: SARS-CoV-2 (COVID-19) RNA not detected, presumed negative.                ,   Results for orders placed or performed during the hospital encounter of 02/11/24   Soft tissue neck CT w contrast    Narrative    EXAM: CT SOFT TISSUE NECK W CONTRAST  LOCATION: [facility]  DATE: 2/11/2024    INDICATION: Stridor.  evaluate for abscess, known epiglottis  COMPARISON: None.  CONTRAST: [id number] 90ml  TECHNIQUE: Routine CT Soft Tissue Neck with IV contrast. Multiplanar reformats. Dose reduction techniques were used.    FINDINGS:     MUCOSAL SPACES/SOFT TISSUES: Minor symmetrical prominence of the palatine tonsils with low-grade narrowing of the oropharyngeal airway at the level the soft palate. No discrete mucosal mass or localized fluid collection to suggest abscess. Additional   nonspecific mild mucosal thickening involving the epiglottis and supraglottic larynx with narrowing of  the supraglottic airway, but without complete airway effacement. Again, no discrete mucosal mass or localized fluid collection identified. Grossly   unremarkable parapharyngeal and subcutaneous soft tissues. Diffuse dental decay. Unremarkable floor of mouth structures.    LYMPH NODES: Scattered subcentimeter cervical lymph nodes, more numerous on the left than the right. None are frankly pathologic by size or morphology criteria.     SALIVARY GLANDS: Normal parotid and submandibular glands.    THYROID: Calcification right superior thyroid lobe.     VESSELS: Mild to moderate atherosclerotic plaque at the carotid bifurcations.    VISUALIZED INTRACRANIAL/ORBITS/SINUSES: Senescent changes of the included intracranial compartment. No acute intraorbital process. Paranasal sinuses and mastoid air cells are clear.    OTHER: Diffuse cervical spondylosis. Left apical interlobular septal thickening. See separate dedicated chest CT.      Impression    IMPRESSION:   1.  Nonspecific mild diffuse mucosal thickening involving the supraglottic larynx and mild prominence of the palatine tonsils. This may be infectious/inflammatory in nature. Mild associated airway narrowing without complete airway obstruction.  2.  No discrete mucosal mass or evidence for abscess.  3.  No pathologic cervical lymph nodes.   CT Chest Pulmonary Embolism w Contrast    Narrative    EXAM: CT CHEST PULMONARY EMBOLISM W CONTRAST  LOCATION: [facility]  DATE: 2/11/2024    INDICATION: Shortness of breath    COMPARISON: High-resolution chest CT 11/22/2023, CT CA 06/10/2022 TECHNIQUE: CT chest pulmonary angiogram during arterial phase injection of IV contrast. Multiplanar reformats and MIP reconstructions were performed. Dose reduction techniques were used.   CONTRAST: [id number] 90ml    FINDINGS:  ANGIOGRAM CHEST: Excellent opacification of pulmonary arteries and branches. No evidence of pulmonary emboli.  Main pulmonary artery is  distended to 3.4 cm. Ascending aorta is aneurysmal at 4.5 cm, unchanged. No dissection.      LUNGS AND PLEURA: Decreased chronic atelectasis in the inferior right middle lobe abutting the fissure. Slight mosaic appearance to the lung bases. Linear scarring laterally in the left upper lobe is unchanged. No effusions.    MEDIASTINUM/AXILLAE: Stable right precarinal and AP nodes measuring up to a centimeter.    CORONARY ARTERY CALCIFICATION: Moderate.    UPPER ABDOMEN: Reflux of contrast into the intrahepatic IVC. Postcholecystectomy distention of the common bile duct stable. Multiple bilateral renal cysts again noted, quite large on the left. These need no follow-up.    MUSCULOSKELETAL: Unremarkable.      Impression    IMPRESSION:  1.  Slight mosaic appearance to the lungs noted. This reflects small airway disease. No evidence of pneumonia or failure.  2.  Mild chronic atelectasis inferiorly in the right middle lobe has decreased in the interval.  3.  Stable 4.5 cm ascending aortic aneurysm.  4.  No evidence of pulmonary emboli. Main pulmonary artery is enlarged consistent with pulmonary arterial hypertension.  5.  Other noncritical findings as noted above.       Discharge Medications   Discharge Medication List as of 2/12/2024 12:31 PM        START taking these medications    Details   doxycycline hyclate (VIBRAMYCIN) 100 MG capsule Take 1 capsule (100 mg) by mouth 2 times daily for 4 days, Disp-8 capsule, R-0, E-Prescribe      ipratropium - albuterol 0.5 mg/2.5 mg/3 mL (DUONEB) 0.5-2.5 (3) MG/3ML neb solution Take 1 vial (3 mLs) by nebulization 3 times daily for 10 days, Disp-90 mL, R-0, E-Prescribe      predniSONE (DELTASONE) 20 MG tablet Take 2 tablets (40 mg) by mouth daily for 3 days, Disp-6 tablet, R-0, E-Prescribe           CONTINUE these medications which have NOT CHANGED    Details   albuterol (PROAIR HFA/PROVENTIL HFA/VENTOLIN HFA) 108 (90 Base) MCG/ACT inhaler Inhale 2 puffs into the lungs every 6 hours as  needed for shortness of breath, wheezing or cough, Disp-18 g, R-0, E-PrescribePharmacy may dispense brand covered by insurance (Proair, or proventil or ventolin or generic albuterol inhaler)      atorvastatin (LIPITOR) 40 MG tablet TAKE 1 PILL BY MOUTH EVERY DAY/NORMA HNUB NOJ 1 LUB TSHUAJ PAB NTSHAV MUAJ ROJ, Disp-90 tablet, R-3, E-Prescribe      levothyroxine (SYNTHROID/LEVOTHROID) 75 MCG tablet Take 0.5 tablets (37.5 mcg) by mouth daily, Disp-45 tablet, R-0, E-Prescribe      lisinopril-hydrochlorothiazide (ZESTORETIC) 20-25 MG tablet Take 1 tablet by mouth daily, Historical           Allergies   No Known Allergies

## 2024-02-14 NOTE — PROGRESS NOTES
[facility] Care Coordination Contact    CC scheduled hospital follow up for Colin Blake for 2/15/24 at 8:15 am. CC contacted son and informed him.    Shazia Quezada RN, BSN, PHN  [facility]  Phone: [phone]

## 2024-02-15 ENCOUNTER — OFFICE VISIT (OUTPATIENT)
Dept: INTERNAL MEDICINE | Facility: CLINIC | Age: 89
End: 2024-02-15
Payer: COMMERCIAL

## 2024-02-15 VITALS
RESPIRATION RATE: 24 BRPM | TEMPERATURE: 97.8 F | SYSTOLIC BLOOD PRESSURE: 130 MMHG | HEIGHT: 60 IN | DIASTOLIC BLOOD PRESSURE: 88 MMHG | HEART RATE: 85 BPM | WEIGHT: 158.2 LBS | OXYGEN SATURATION: 97 % | BODY MASS INDEX: 31.06 KG/M2

## 2024-02-15 DIAGNOSIS — E03.9 HYPOTHYROIDISM, UNSPECIFIED TYPE: ICD-10-CM

## 2024-02-15 DIAGNOSIS — Z09 HOSPITAL DISCHARGE FOLLOW-UP: Primary | ICD-10-CM

## 2024-02-15 DIAGNOSIS — I71.60 THORACOABDOMINAL AORTIC ANEURYSM (TAAA) WITHOUT RUPTURE, UNSPECIFIED PART (H): ICD-10-CM

## 2024-02-15 DIAGNOSIS — R06.2 WHEEZING: ICD-10-CM

## 2024-02-15 DIAGNOSIS — N18.31 STAGE 3A CHRONIC KIDNEY DISEASE (H): ICD-10-CM

## 2024-02-15 PROBLEM — D58.2 ELEVATED HEMOGLOBIN (H): Status: RESOLVED | Noted: 2021-01-12 | Resolved: 2024-02-15

## 2024-02-15 PROCEDURE — 99214 OFFICE O/P EST MOD 30 MIN: CPT | Performed by: NURSE PRACTITIONER

## 2024-02-15 RX ORDER — RESPIRATORY SYNCYTIAL VIRUS VACCINE 120MCG/0.5
0.5 KIT INTRAMUSCULAR ONCE
Qty: 1 EACH | Refills: 0 | Status: CANCELLED | OUTPATIENT
Start: 2024-02-15 | End: 2024-02-15

## 2024-02-15 RX ORDER — ALBUTEROL SULFATE 0.83 MG/ML
2.5 SOLUTION RESPIRATORY (INHALATION) EVERY 6 HOURS PRN
Qty: 90 ML | Refills: 3 | Status: SHIPPED | OUTPATIENT
Start: 2024-02-15

## 2024-02-15 NOTE — ASSESSMENT & PLAN NOTE
PFTs discussed, patient agreeable with testing  Follow up after testing is completed   For now, continue with albuterol nebulizer as needed for wheezing or shortness of breath

## 2024-02-15 NOTE — PROGRESS NOTES
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          Chronic kidney disease, stage 3 (H)     Stable creatinine          Hypothyroidism, unspecified type     Euthyroid on 2/2024 lab testing          Wheezing     PFTs discussed, patient agreeable with testing  Follow up after testing is completed   For now, continue with albuterol nebulizer as needed for wheezing or shortness of breath         Relevant Medications    albuterol (PROVENTIL) (2.5 MG/3ML) 0.083% neb solution    Other Relevant Orders    General PFT Lab (Please always keep checked)    Pulmonary Function Test     Other Visit Diagnoses       Hospital discharge follow-up    -  Primary           - We reviewed pulmonary function testing again, he is open to this so a new order is placed  - Continue with albuterol inhaler or nebulizer as needed       Post Medication Reconciliation Status:  Discharge medications reconciled and changed, see notes/orders  BMI  Estimated body mass index is 30.9 kg/m  as calculated from the following:    Height as of this encounter: 1.524 m (5').    Weight as of this encounter: 71.8 kg (158 lb 3.2 oz).         Lio Shaw is a 89 year old, presenting for the following health issues:  Hospital F/U        2/15/2024     8:14 AM   Additional Questions   Roomed by Apolonia RANDLE CMA   Accompanied by [place]     [facility] Follow-up Visit:  Hospital/Nursing Home/IP Rehab Facility: [facility]  Date of Admission: 02/11/2024  Date of Discharge: 02/12/2024  Reason(s) for Admission: Hypertension  [place] states that patient went to the hospital because he was coughing and breathing really bad.  Was your hospitalization related to COVID-19? No   Problems taking medications regularly:  None  Medication changes since discharge: nebulizer, prednisone,  doxycycline  Problems adhering to non-medication therapy:  None    Colin Car is here for follow up of a recent hospitalization, he was discharged 3 days ago. He was scheduled for PFTs but he missed the appointment so it is uncertain if he has asthma or COPD. He frequently has wheezing. He has been using the nebulizer once daily and finds that this has been helpful. He is no longer wheezing. We talked more about the PFT and what this would entail.         Objective    /88 (BP Location: Right arm, Patient Position: Sitting, Cuff Size: Adult Regular)   Pulse 85   Temp 97.8  F (36.6  C) (Oral)   Resp 24   Ht 1.524 m (5')   Wt 71.8 kg (158 lb 3.2 oz)   SpO2 97%   BMI 30.90 kg/m    Body mass index is 30.9 kg/m .    Physical Exam   GENERAL: alert and no distress  RESP: lungs clear to auscultation - no rales, rhonchi or wheezes  CV: regular rate and rhythm, normal S1 S2, no S3 or S4, no murmur. No edema             Signed Electronically by: Allison Belle NP

## 2024-02-16 LAB
BACTERIA BLD CULT: NO GROWTH
BACTERIA BLD CULT: NO GROWTH

## 2024-02-19 LAB
ATRIAL RATE - MUSE: 74 BPM
DIASTOLIC BLOOD PRESSURE - MUSE: NORMAL MMHG
INTERPRETATION ECG - MUSE: NORMAL
P AXIS - MUSE: 28 DEGREES
PR INTERVAL - MUSE: 194 MS
QRS DURATION - MUSE: 82 MS
QT - MUSE: 508 MS
QTC - MUSE: 563 MS
R AXIS - MUSE: -5 DEGREES
SYSTOLIC BLOOD PRESSURE - MUSE: NORMAL MMHG
T AXIS - MUSE: 22 DEGREES
VENTRICULAR RATE- MUSE: 74 BPM

## 2024-02-23 ENCOUNTER — PATIENT OUTREACH (OUTPATIENT)
Dept: GERIATRIC MEDICINE | Facility: CLINIC | Age: 89
End: 2024-02-23
Payer: COMMERCIAL

## 2024-02-23 NOTE — PROGRESS NOTES
[facility] Care Coordination Contact    Internal CC change effective 3/1/2024.  Mailed member CC Change letter.  Additional tasks to be completed by CMS include: update database & EPIC, enter CC Change in MMIS, and move member file.    Jackie Yao  Case Management Specialist  [facility]  [phone]

## 2024-02-23 NOTE — LETTER
February 23, 2024    Important [employer] Information    BEATRICE BUNEROSTROO MOHINDER  [address] [address]    Your New Care Coordinator  Dear Beatrice,  My name is Eliane Cao RN  and I am your new Care Coordinator. You may reach me by calling [phone]. I will be in touch with you shortly to address any questions you may have.   I have also been in contact with Shazia Quezada RN, PHN, your previous care coordinator, to ensure a smooth transition.  Questions?  Call me at [phone] Monday-Friday between 8am and 5pm. TTY: [phone]. I look forward to working with you as a [employer]  member.  Sincerely,    Eliane Cao RN    E-mail: [email or web address]  Phone: [phone]      [facility]    cc: member records

## 2024-06-03 ENCOUNTER — PATIENT OUTREACH (OUTPATIENT)
Dept: GERIATRIC MEDICINE | Facility: CLINIC | Age: 89
End: 2024-06-03
Payer: COMMERCIAL

## 2024-06-03 NOTE — PROGRESS NOTES
Called adult son Robbie Car to schedule annual HRA home visit. HRA has been scheduled for 6/12/2024 at 10:00 am.    Eliane Cao RN, BSN, N  [facility] Care Coordinator  Phone: [phone]   Fax [phone]   [email or web address]

## 2024-06-12 ENCOUNTER — PATIENT OUTREACH (OUTPATIENT)
Dept: GERIATRIC MEDICINE | Facility: CLINIC | Age: 89
End: 2024-06-12
Payer: COMMERCIAL

## 2024-06-13 SDOH — HEALTH STABILITY: PHYSICAL HEALTH: ON AVERAGE, HOW MANY DAYS PER WEEK DO YOU ENGAGE IN MODERATE TO STRENUOUS EXERCISE (LIKE A BRISK WALK)?: 0 DAYS

## 2024-06-13 SDOH — HEALTH STABILITY: PHYSICAL HEALTH: ON AVERAGE, HOW MANY MINUTES DO YOU ENGAGE IN EXERCISE AT THIS LEVEL?: 0 MIN

## 2024-06-13 ASSESSMENT — LIFESTYLE VARIABLES
SKIP TO QUESTIONS 9-10: 1
HOW MANY STANDARD DRINKS CONTAINING ALCOHOL DO YOU HAVE ON A TYPICAL DAY: PATIENT DOES NOT DRINK
HOW OFTEN DO YOU HAVE A DRINK CONTAINING ALCOHOL: NEVER
AUDIT-C TOTAL SCORE: 0
HOW OFTEN DO YOU HAVE SIX OR MORE DRINKS ON ONE OCCASION: NEVER

## 2024-06-13 ASSESSMENT — SOCIAL DETERMINANTS OF HEALTH (SDOH)
HOW OFTEN DO YOU GET TOGETHER WITH FRIENDS OR RELATIVES?: MORE THAN THREE TIMES A WEEK
HOW OFTEN DO YOU ATTEND CHURCH OR RELIGIOUS SERVICES?: 1 TO 4 TIMES PER YEAR
IN A TYPICAL WEEK, HOW MANY TIMES DO YOU TALK ON THE PHONE WITH FAMILY, FRIENDS, OR NEIGHBORS?: MORE THAN THREE TIMES A WEEK
DO YOU BELONG TO ANY CLUBS OR ORGANIZATIONS SUCH AS CHURCH GROUPS UNIONS, FRATERNAL OR ATHLETIC GROUPS, OR SCHOOL GROUPS?: NO
HOW OFTEN DO YOU ATTENT MEETINGS OF THE CLUB OR ORGANIZATION YOU BELONG TO?: NEVER

## 2024-06-13 NOTE — PROGRESS NOTES
"[facility] Care Coordination Contact    [facility] Home Visit Assessment     Home visit for Health Risk Assessment with Colin Car completed on June 12, 2024    Type of residence:: Private home - stairs  Current living arrangement:: I live in a private home with spouse     Assessment completed with:: Patient, Care Team Member    Current Care Plan  Member currently receiving the following home care services:     Member currently receiving the following community resources: PCA, Day Care, Transportation Services, County Programs, County Worker    Medication Review  Medication reconciliation completed in Epic: Yes  Medication set-up & administration: Independent-does not set up.  Self-administers medications.  Medication Risk Assessment Medication (1 or more, place referral to MTM): Lacks understanding of medication regimen and Difficulty adhering to medication regimen  MTM Referral Placed: No: Colin shared that he has decided to stop taking all of his medications for the past two months.  He states that he has been taking these medications for a long time, and since the medication is not able \"cure\" his health conditions he doesn't see a \"need to continue taking them.\"    provided education around Western medicine and offered MTM service but he declined. He is aware of the health risks involved by discontinuing all prescribed medications.      Mental/Behavioral Health   Depression Screening:   PHQ-2 Total Score (Adult) - Positive if 3 or more points; Administer PHQ-9 if positive: 0     Mental health DX:: No        Falls Assessment:   Fallen 2 or more times in the past year?: No   Any fall with injury in the past year?: No    ADL/IADL Dependencies:   Dependent ADLs:: Ambulation-cane, Bathing, Dressing, Grooming, Toileting, climbing stairs.  Dependent IADLs:: Cleaning, Cooking, Laundry, Shopping, Meal Preparation, Medication Management, Money Management, Transportation    Health Plan sponsored " benefits: [facility]: Shared information regarding One Pass Fitness Program. Reviewed preventative health screening and health plan supplemental benefits/incentives. Reviewed medication disposal form.    PCA Assessment completed at visit: Yes Annual PCA assessment indicated 3.5 hours per day of PCA. This is the same as the previous assessment.     Elderly Waiver Eligibility: Yes-will open to EW    Care Plan & Recommendations: Colin wishes to remain living at home with his spouse and family, continue to receive PCA service, and attend [facility] five days a week for more outside socialization with his peers, thus decreasing isolation and improving his emotional and mental health.     See [facility] for detailed assessment information.    Follow-Up Plan: Member informed of future contact, plan to f/u with member with a 6 month telephone assessment.  Contact information shared with member and family, encouraged member to call with any questions or concerns at any time.    [facility] care continuum providers: Please see Snapshot and Care Management Flowsheets for Specific details of care plan.    This CC note routed to PCP, Allison Belle.    Eliane Cao RN, BSN, PHN  [facility] Care Coordinator  Phone: [phone]   Fax [phone]   [email or web address]

## 2024-06-22 DIAGNOSIS — I10 PRIMARY HYPERTENSION: Primary | ICD-10-CM

## 2024-06-22 DIAGNOSIS — E78.2 MIXED HYPERLIPIDEMIA: ICD-10-CM

## 2024-06-24 RX ORDER — ATORVASTATIN CALCIUM 40 MG/1
TABLET, FILM COATED ORAL
Qty: 30 TABLET | Refills: 0 | Status: SHIPPED | OUTPATIENT
Start: 2024-06-24

## 2024-06-24 RX ORDER — LISINOPRIL AND HYDROCHLOROTHIAZIDE 20; 25 MG/1; MG/1
TABLET ORAL
Qty: 30 TABLET | Refills: 0 | Status: SHIPPED | OUTPATIENT
Start: 2024-06-24

## 2024-06-25 ENCOUNTER — PATIENT OUTREACH (OUTPATIENT)
Dept: GERIATRIC MEDICINE | Facility: CLINIC | Age: 89
End: 2024-06-25
Payer: COMMERCIAL

## 2024-06-25 NOTE — PROGRESS NOTES
[facility] Care Coordination Contact    Received after visit chart from care coordinator.  Completed following tasks: Mailed copy of care plan/support plan to member, Mailed [place] Choices signature sheet pages 3-4, Mailed Safe Medication Disposal , Mailed [employer] Leave Behind Letter, Submitted referrals/auths for ADC & PCA, and Updated services in Database    , Provider Signature - No POC Shared:  Member indicates that they do not want their POC shared with any EW providers.    [employer]: Faxed completed PCA assessment to PCA Agency and mailed copies to member.  Faxed MD Communication to PCP.  Emailed referral form for auth to [employer].      Deja Shankar  Care Management Specialist   [facility]   [phone]

## 2024-06-25 NOTE — LETTER
June 25, 2024    BEATRICE VINES  [address] [place] [address]    Shin Shaw,    I hope you ve been well since we last spoke. Attached is your copy of your personalized Support Plan which summarizes our conversation.   My goal is to help you keep your health on track. Your Support Plan includes the steps we agreed would help you with that. We can talk about these steps during our next meeting or sooner if you d like.   Here are additional programs and services I can help you with:  Get to appointments with [facility]  If you need a ride to medical or dental visits, [facility] can help. Call to schedule a ride.   6 [phone] (TTY:[phone]), 8 a.m. - 6 p.m. CT, Monday - Friday  Access One-Pass to boost your health  Get a gym membership, at-home fitness classes, and free access to fun activities that help your brain. To get access, go to [employer] or call One-Pass.    6 [phone] (TTY:719), 8 a.m. - 9 p.m. CT, Monday - Friday  Get help paying for healthy food and utilities  As a member, you get these benefits:  $150 monthly allowance to buy healthy food at participating grocery stores  One $0 ride per day to participating grocery stores and back home  $100 monthly allowance to help pay your utility bills  If you don t know how to access these benefits, I'm here to help you and answer questions.  Set up your health care directive  A directive is a legal form that explains your health care wishes. You can request this form from me, and I ll walk you through it before you discuss your plans with your doctor.  Schedule your annual physical  I can help set up your annual physical at your clinic of choice. It's an important step towards good health.    Have questions? I m here to help.  Call me at [phone] (TTY:[phone]) 8am - 5pm, Monday - Friday.  I ll reach out to you again in 6 months to follow up via telephone.  Warm regards,    Eliane Cao RN    E-mail:  [email or web address]  Phone: [phone]      [facility]    cc: member records                                                        American Indians can continue or begin to use [facility] and [facility] clinics. We will not require prior approval or impose any conditions for you to get services at these clinics. For elders age 65 years and older this includes Elderly Waiver (EW) services accessed through the [place]. If a doctor or other provider in a [facility] or [facility] clinic refers you to a provider in our network, we will not require you to see your primary care provider prior to the referral.      [id number]

## 2024-08-29 ENCOUNTER — PATIENT OUTREACH (OUTPATIENT)
Dept: GERIATRIC MEDICINE | Facility: CLINIC | Age: 89
End: 2024-08-29
Payer: COMMERCIAL

## 2024-08-30 NOTE — PROGRESS NOTES
[facility] Care Coordination Contact    Received a request to submit a DTR for the terminated of Supplies wipes. Documentation completed and faxed to the health plan. Care Coordinator aware.    Katia Hernandez RN  Utilization   [facility]  [phone]

## 2024-09-13 ENCOUNTER — PATIENT OUTREACH (OUTPATIENT)
Dept: GERIATRIC MEDICINE | Facility: CLINIC | Age: 89
End: 2024-09-13
Payer: COMMERCIAL

## 2024-09-13 NOTE — PROGRESS NOTES
Received an email from Maru Moscoso stating that [facility] is waiting for a new authorization for wipes. The care coordinator (CC) reviewed the member's plan of care (POC) and noted that a DTR was completed on 8/29/2024. CC called the member to verify whether he had requested new wipes. The member stated that he does not need wipes. When asked why he initially required them, the member explained that he used the wipes to clean the table or dust off his clothes. CC clarified that these wipes are intended for bowel incontinence, not for general cleaning. The member confirmed that he does not have bowel incontinence.    Eliane Cao RN, BSN, PHN  [facility] Care Coordinator  Phone: [phone]   Fax [phone]   [email or web address]

## 2024-09-27 ENCOUNTER — PATIENT OUTREACH (OUTPATIENT)
Dept: GERIATRIC MEDICINE | Facility: CLINIC | Age: 89
End: 2024-09-27
Payer: COMMERCIAL

## 2024-09-27 NOTE — PROGRESS NOTES
[facility] Care Coordination Contact    Per CC, Completed following tasks: Submitted referrals/auths for wipes auth date 8/1/24 thru 9/16/24  and Updated services in Database    Cheryl Torres  Case Management Specialist   [facility]  [phone]

## 2024-11-24 ENCOUNTER — OFFICE VISIT (OUTPATIENT)
Dept: URGENT CARE | Facility: URGENT CARE | Age: 89
End: 2024-11-24
Payer: COMMERCIAL

## 2024-11-24 VITALS
TEMPERATURE: 98.5 F | WEIGHT: 162.3 LBS | BODY MASS INDEX: 31.7 KG/M2 | DIASTOLIC BLOOD PRESSURE: 129 MMHG | SYSTOLIC BLOOD PRESSURE: 199 MMHG | OXYGEN SATURATION: 96 % | HEART RATE: 89 BPM | RESPIRATION RATE: 20 BRPM

## 2024-11-24 DIAGNOSIS — K62.5 RECTAL BLEEDING: ICD-10-CM

## 2024-11-24 DIAGNOSIS — I10 ELEVATED BLOOD PRESSURE READING IN OFFICE WITH DIAGNOSIS OF HYPERTENSION: ICD-10-CM

## 2024-11-24 DIAGNOSIS — R06.2 WHEEZING: ICD-10-CM

## 2024-11-24 DIAGNOSIS — K59.00 CONSTIPATION, UNSPECIFIED CONSTIPATION TYPE: ICD-10-CM

## 2024-11-24 DIAGNOSIS — K60.2 RECTAL FISSURE: Primary | ICD-10-CM

## 2024-11-24 LAB
ALBUMIN SERPL BCG-MCNC: 4.3 G/DL (ref 3.5–5.2)
ALP SERPL-CCNC: 68 U/L (ref 40–150)
ALT SERPL W P-5'-P-CCNC: 18 U/L (ref 0–70)
ANION GAP SERPL CALCULATED.3IONS-SCNC: 11 MMOL/L (ref 7–15)
AST SERPL W P-5'-P-CCNC: 38 U/L (ref 0–45)
BASOPHILS # BLD AUTO: 0 10E3/UL (ref 0–0.2)
BASOPHILS NFR BLD AUTO: 0 %
BILIRUB DIRECT SERPL-MCNC: <0.2 MG/DL (ref 0–0.3)
BILIRUB SERPL-MCNC: 0.9 MG/DL
BUN SERPL-MCNC: 15.3 MG/DL (ref 8–23)
CALCIUM SERPL-MCNC: 8.8 MG/DL (ref 8.8–10.4)
CHLORIDE SERPL-SCNC: 101 MMOL/L (ref 98–107)
CREAT SERPL-MCNC: 1.17 MG/DL (ref 0.67–1.17)
EGFRCR SERPLBLD CKD-EPI 2021: 60 ML/MIN/1.73M2
EOSINOPHIL # BLD AUTO: 0.1 10E3/UL (ref 0–0.7)
EOSINOPHIL NFR BLD AUTO: 1 %
ERYTHROCYTE [DISTWIDTH] IN BLOOD BY AUTOMATED COUNT: 12.5 % (ref 10–15)
GLUCOSE SERPL-MCNC: 98 MG/DL (ref 70–99)
HCO3 SERPL-SCNC: 24 MMOL/L (ref 22–29)
HCT VFR BLD AUTO: 51.5 % (ref 40–53)
HGB BLD-MCNC: 17.5 G/DL (ref 13.3–17.7)
IMM GRANULOCYTES # BLD: 0 10E3/UL
IMM GRANULOCYTES NFR BLD: 0 %
LYMPHOCYTES # BLD AUTO: 3.4 10E3/UL (ref 0.8–5.3)
LYMPHOCYTES NFR BLD AUTO: 44 %
MCH RBC QN AUTO: 32.1 PG (ref 26.5–33)
MCHC RBC AUTO-ENTMCNC: 34 G/DL (ref 31.5–36.5)
MCV RBC AUTO: 95 FL (ref 78–100)
MONOCYTES # BLD AUTO: 0.7 10E3/UL (ref 0–1.3)
MONOCYTES NFR BLD AUTO: 9 %
NEUTROPHILS # BLD AUTO: 3.5 10E3/UL (ref 1.6–8.3)
NEUTROPHILS NFR BLD AUTO: 45 %
PLATELET # BLD AUTO: 141 10E3/UL (ref 150–450)
POTASSIUM SERPL-SCNC: 3.8 MMOL/L (ref 3.4–5.3)
PROT SERPL-MCNC: 7.8 G/DL (ref 6.4–8.3)
RBC # BLD AUTO: 5.45 10E6/UL (ref 4.4–5.9)
SODIUM SERPL-SCNC: 136 MMOL/L (ref 135–145)
WBC # BLD AUTO: 7.7 10E3/UL (ref 4–11)

## 2024-11-24 PROCEDURE — 99215 OFFICE O/P EST HI 40 MIN: CPT | Performed by: FAMILY MEDICINE

## 2024-11-24 PROCEDURE — 36415 COLL VENOUS BLD VENIPUNCTURE: CPT | Performed by: FAMILY MEDICINE

## 2024-11-24 PROCEDURE — 82248 BILIRUBIN DIRECT: CPT | Performed by: FAMILY MEDICINE

## 2024-11-24 PROCEDURE — 85025 COMPLETE CBC W/AUTO DIFF WBC: CPT | Performed by: FAMILY MEDICINE

## 2024-11-24 PROCEDURE — 80053 COMPREHEN METABOLIC PANEL: CPT | Performed by: FAMILY MEDICINE

## 2024-11-24 RX ORDER — MUPIROCIN 20 MG/G
OINTMENT TOPICAL 3 TIMES DAILY
Qty: 22 G | Refills: 0 | Status: SHIPPED | OUTPATIENT
Start: 2024-11-24

## 2024-11-24 RX ORDER — HYDROCORTISONE 25 MG/G
CREAM TOPICAL 2 TIMES DAILY PRN
Qty: 22 G | Refills: 0 | Status: SHIPPED | OUTPATIENT
Start: 2024-11-24

## 2024-11-24 NOTE — PATIENT INSTRUCTIONS
Start docusate 1 pill at bedtime every night to soften stools    Increase prune juice and apple juice to help with constipation    Apply the hydrocortisone cream to entire rectal area followed by the Bactroban ( mupirocin)  ointment  in am after shower, one time during the day after a bowel movement and at bedtime for 10 days -if bleeding returns resume applying the creams as instructed     If bleeding more severe, abdominal pain, black stools to ER

## 2024-11-24 NOTE — PROGRESS NOTES
ASSESSMENT/PLAN:      ICD-10-CM    1. Rectal fissure  K60.2 hydrocortisone, Perianal, (HYDROCORTISONE) 2.5 % cream     mupirocin (BACTROBAN) 2 % external ointment      2. Rectal bleeding  K62.5 CBC with platelets and differential     Basic metabolic panel  (Ca, Cl, CO2, Creat, Gluc, K, Na, BUN)     Hepatic panel (Albumin, ALT, AST, Bili, Alk Phos, TP)     CBC with platelets and differential     Basic metabolic panel  (Ca, Cl, CO2, Creat, Gluc, K, Na, BUN)     Hepatic panel (Albumin, ALT, AST, Bili, Alk Phos, TP)      3. Constipation, unspecified constipation type  K59.00 docusate sodium (COLACE) 50 MG capsule          Patient Instructions     Start docusate 1 pill at bedtime every night to soften stools    Increase prune juice and apple juice to help with constipation    Apply the hydrocortisone cream to entire rectal area followed by the Bactroban ( mupirocin)  ointment  in am after shower, one time during the day after a bowel movement and at bedtime for 10 days -if bleeding returns resume applying the creams as instructed     If bleeding more severe, abdominal pain, black stools to ER           Reviewed medication instructions and side effects. Follow up if experiences side effects.     I reviewed supportive care, otc meds to use if needed, expected course, and signs of concern.  Follow up as needed or if he does not improve within  1-2 days or if worsens in any way.  Reviewed red flag symptoms and is to go to the ER if experiences any of these.     The use of Dragon/Informantonline dictation services may have been used to construct the content in this note; any grammatical or spelling errors are non-intentional. Please contact the author of this note directly if you are in need of any clarification.      Due to language barrier, patient stated that his granddaughter Susan Car serve as an  declined urgent care clinic  service.   was present during the history-taking and subsequent  "discussion and the physical exam with this patient.     On the day of the encounter, time spend on chart review, patient visit, review of testing, documentation was 40  minutes              Patient presents with:  Rectal Bleeding: X2d, bright red       Subjective     Shaw Lou Car is a 89 year old male who presents to clinic today for the following health issues:    HPI     Rectal bleeding    Duration: 2 days  Description:   Pain: no   Itching: no   Accompanying signs and symptoms:   Blood in stool: no   Changes in stool pattern: YES-eating less passing small amounts of stool having to strain to pass stool-history of constipation  History (similar episodes/previous evaluation): ***  Precipitating or alleviating factors: {NONE DEFAULTED:142789::\"none\"}  Therapies tried and outcome: {.:182886}  {ACUTE SUPERLIST - extended history:022938}    Past Medical History:   Diagnosis Date    Hyperlipidemia     Hypertension     Hypothyroidism     Major depressive disorder, recurrent episode, mild (H)     Created by Conversion      Social History     Tobacco Use    Smoking status: Never    Smokeless tobacco: Never   Substance Use Topics    Alcohol use: No       Current Outpatient Medications   Medication Sig Dispense Refill    albuterol (PROAIR HFA/PROVENTIL HFA/VENTOLIN HFA) 108 (90 Base) MCG/ACT inhaler Inhale 2 puffs into the lungs every 6 hours as needed for shortness of breath, wheezing or cough 18 g 0    albuterol (PROVENTIL) (2.5 MG/3ML) 0.083% neb solution Take 1 vial (2.5 mg) by nebulization every 6 hours as needed for shortness of breath, wheezing or cough 90 mL 3    docusate sodium (COLACE) 50 MG capsule Take 1 capsule (50 mg) by mouth at bedtime. 30 capsule 0    hydrocortisone, Perianal, (HYDROCORTISONE) 2.5 % cream Place rectally 2 times daily as needed for hemorrhoids. 22 g 0    mupirocin (BACTROBAN) 2 % external ointment Apply topically 3 times daily. 22 g 0    ipratropium - albuterol 0.5 mg/2.5 mg/3 mL (DUONEB) " 0.5-2.5 (3) MG/3ML neb solution Take 1 vial (3 mLs) by nebulization 3 times daily for 10 days 90 mL 0     No Known Allergies          ROS are negative, except as otherwise noted HPI      Objective    BP (!) 199/129   Pulse 89   Temp 98.5  F (36.9  C) (Oral)   Resp 20   Wt 73.6 kg (162 lb 4.8 oz)   SpO2 96%   BMI 31.70 kg/m    Body mass index is 31.7 kg/m .  Physical Exam   {Exam List (Optional):740170}  {O PHRASES:435812}     Diagnostic Test Results:  Labs reviewed in Epic  Results for orders placed or performed in visit on 11/24/24   Basic metabolic panel  (Ca, Cl, CO2, Creat, Gluc, K, Na, BUN)     Status: Abnormal   Result Value Ref Range    Sodium 136 135 - 145 mmol/L    Potassium 3.8 3.4 - 5.3 mmol/L    Chloride 101 98 - 107 mmol/L    Carbon Dioxide (CO2) 24 22 - 29 mmol/L    Anion Gap 11 7 - 15 mmol/L    Urea Nitrogen 15.3 8.0 - 23.0 mg/dL    Creatinine 1.17 0.67 - 1.17 mg/dL    GFR Estimate 60 (L) >60 mL/min/1.73m2    Calcium 8.8 8.8 - 10.4 mg/dL    Glucose 98 70 - 99 mg/dL   Hepatic panel (Albumin, ALT, AST, Bili, Alk Phos, TP)     Status: Normal   Result Value Ref Range    Protein Total 7.8 6.4 - 8.3 g/dL    Albumin 4.3 3.5 - 5.2 g/dL    Bilirubin Total 0.9 <=1.2 mg/dL    Alkaline Phosphatase 68 40 - 150 U/L    AST 38 0 - 45 U/L    ALT 18 0 - 70 U/L    Bilirubin Direct <0.20 0.00 - 0.30 mg/dL   CBC with platelets and differential     Status: Abnormal   Result Value Ref Range    WBC Count 7.7 4.0 - 11.0 10e3/uL    RBC Count 5.45 4.40 - 5.90 10e6/uL    Hemoglobin 17.5 13.3 - 17.7 g/dL    Hematocrit 51.5 40.0 - 53.0 %    MCV 95 78 - 100 fL    MCH 32.1 26.5 - 33.0 pg    MCHC 34.0 31.5 - 36.5 g/dL    RDW 12.5 10.0 - 15.0 %    Platelet Count 141 (L) 150 - 450 10e3/uL    % Neutrophils 45 %    % Lymphocytes 44 %    % Monocytes 9 %    % Eosinophils 1 %    % Basophils 0 %    % Immature Granulocytes 0 %    Absolute Neutrophils 3.5 1.6 - 8.3 10e3/uL    Absolute Lymphocytes 3.4 0.8 - 5.3 10e3/uL    Absolute  Monocytes 0.7 0.0 - 1.3 10e3/uL    Absolute Eosinophils 0.1 0.0 - 0.7 10e3/uL    Absolute Basophils 0.0 0.0 - 0.2 10e3/uL    Absolute Immature Granulocytes 0.0 <=0.4 10e3/uL   CBC with platelets and differential     Status: Abnormal    Narrative    The following orders were created for panel order CBC with platelets and differential.  Procedure                               Abnormality         Status                     ---------                               -----------         ------                     CBC with platelets and d...[[id number]]  Abnormal            Final result                 Please view results for these tests on the individual orders.                      mouth at bedtime. 30 capsule 0    hydrocortisone, Perianal, (HYDROCORTISONE) 2.5 % cream Place rectally 2 times daily as needed for hemorrhoids. 22 g 0    mupirocin (BACTROBAN) 2 % external ointment Apply topically 3 times daily. 22 g 0    ipratropium - albuterol 0.5 mg/2.5 mg/3 mL (DUONEB) 0.5-2.5 (3) MG/3ML neb solution Take 1 vial (3 mLs) by nebulization 3 times daily for 10 days 90 mL 0     No Known Allergies          ROS are negative, except as otherwise noted HPI      Objective    BP (!) 199/129   Pulse 89   Temp 98.5  F (36.9  C) (Oral)   Resp 20   Wt 73.6 kg (162 lb 4.8 oz)   SpO2 96%   BMI 31.70 kg/m    Body mass index is 31.7 kg/m .    Vitals:    11/24/24 1354 11/24/24 1358   BP: (!) 200/136 (!) 199/129   Pulse: 76 89   Resp: 20    Temp: 98.5  F (36.9  C)    TempSrc: Oral    SpO2: 96%    Weight: 73.6 kg (162 lb 4.8 oz)             Physical Exam   GENERAL: alert and no distress  RESP: lungs clear to auscultation - no rales, rhonchi or wheezes  CV: regular rate and rhythm, no murmur, click or rub,   ABDOMEN: soft, mild bloating, non tender, no hepatosplenomegaly, no palpable masses and bowel sounds normal  RECTAL (male): normal sphincter tone, mild excoriated perianal tissue with small rectal fissure few flakes of dried blood, no active bleeding, no extrenal hemorrhoids no rectal masses,-daughter of patient present standing next to exam table during entire exam  of rectal area  MS: no gross musculoskeletal defects noted, no edema  NEURO: baseline strength and tone, mentation intact and speech normal, baseline gait       Diagnostic Test Results:  Labs reviewed in Epic  Results for orders placed or performed in visit on 11/24/24   Basic metabolic panel  (Ca, Cl, CO2, Creat, Gluc, K, Na, BUN)     Status: Abnormal   Result Value Ref Range    Sodium 136 135 - 145 mmol/L    Potassium 3.8 3.4 - 5.3 mmol/L    Chloride 101 98 - 107 mmol/L    Carbon Dioxide (CO2) 24 22 - 29 mmol/L    Anion Gap 11 7 - 15 mmol/L     Urea Nitrogen 15.3 8.0 - 23.0 mg/dL    Creatinine 1.17 0.67 - 1.17 mg/dL    GFR Estimate 60 (L) >60 mL/min/1.73m2    Calcium 8.8 8.8 - 10.4 mg/dL    Glucose 98 70 - 99 mg/dL   Hepatic panel (Albumin, ALT, AST, Bili, Alk Phos, TP)     Status: Normal   Result Value Ref Range    Protein Total 7.8 6.4 - 8.3 g/dL    Albumin 4.3 3.5 - 5.2 g/dL    Bilirubin Total 0.9 <=1.2 mg/dL    Alkaline Phosphatase 68 40 - 150 U/L    AST 38 0 - 45 U/L    ALT 18 0 - 70 U/L    Bilirubin Direct <0.20 0.00 - 0.30 mg/dL   CBC with platelets and differential     Status: Abnormal   Result Value Ref Range    WBC Count 7.7 4.0 - 11.0 10e3/uL    RBC Count 5.45 4.40 - 5.90 10e6/uL    Hemoglobin 17.5 13.3 - 17.7 g/dL    Hematocrit 51.5 40.0 - 53.0 %    MCV 95 78 - 100 fL    MCH 32.1 26.5 - 33.0 pg    MCHC 34.0 31.5 - 36.5 g/dL    RDW 12.5 10.0 - 15.0 %    Platelet Count 141 (L) 150 - 450 10e3/uL    % Neutrophils 45 %    % Lymphocytes 44 %    % Monocytes 9 %    % Eosinophils 1 %    % Basophils 0 %    % Immature Granulocytes 0 %    Absolute Neutrophils 3.5 1.6 - 8.3 10e3/uL    Absolute Lymphocytes 3.4 0.8 - 5.3 10e3/uL    Absolute Monocytes 0.7 0.0 - 1.3 10e3/uL    Absolute Eosinophils 0.1 0.0 - 0.7 10e3/uL    Absolute Basophils 0.0 0.0 - 0.2 10e3/uL    Absolute Immature Granulocytes 0.0 <=0.4 10e3/uL   CBC with platelets and differential     Status: Abnormal    Narrative    The following orders were created for panel order CBC with platelets and differential.  Procedure                               Abnormality         Status                     ---------                               -----------         ------                     CBC with platelets and d...[[id number]]  Abnormal            Final result                 Please view results for these tests on the individual orders.

## 2025-01-19 ENCOUNTER — HEALTH MAINTENANCE LETTER (OUTPATIENT)
Age: 89
End: 2025-01-19

## 2025-01-29 ENCOUNTER — TELEPHONE (OUTPATIENT)
Dept: INTERNAL MEDICINE | Facility: CLINIC | Age: 89
End: 2025-01-29

## 2025-01-29 NOTE — TELEPHONE ENCOUNTER
[facility]    Form received. Patient has not been seen for almost over 1 year. Patient will need to come in for AWV..    Writer faxed form back to them with message that patient needs to be seen for completion of the form.    Form is at writer's desk in the incomplete bin.

## 2025-02-04 ENCOUNTER — PATIENT OUTREACH (OUTPATIENT)
Dept: GERIATRIC MEDICINE | Facility: CLINIC | Age: 89
End: 2025-02-04
Payer: COMMERCIAL

## 2025-02-04 NOTE — PROGRESS NOTES
[facility] Care Coordination Contact    [facility] Six-Month Telephone Assessment    6 month telephone assessment completed on 1/31/2025    ER visits: No  Hospitalizations: No  TCU stays: No  Significant health status changes: None reported.  Falls/Injuries: No  ADL/IADL changes: No  Changes in services: No    Caregiver Assessment follow-up:  N/A    Goals: See POC in chart for goal progress documentation.      Will see member in 6 months for an annual health risk assessment.   Encouraged member to call CC with any questions or concerns in the meantime.     Eliane Cao RN, BSN, PHN  [facility] Care Coordinator  Phone: [phone]   Fax [phone]   [email or web address]

## 2025-03-06 ENCOUNTER — OFFICE VISIT (OUTPATIENT)
Dept: FAMILY MEDICINE | Facility: CLINIC | Age: 89
End: 2025-03-06
Payer: COMMERCIAL

## 2025-03-06 VITALS
TEMPERATURE: 97.9 F | BODY MASS INDEX: 32.98 KG/M2 | DIASTOLIC BLOOD PRESSURE: 98 MMHG | RESPIRATION RATE: 24 BRPM | SYSTOLIC BLOOD PRESSURE: 148 MMHG | HEART RATE: 87 BPM | OXYGEN SATURATION: 98 % | WEIGHT: 168 LBS | HEIGHT: 60 IN

## 2025-03-06 DIAGNOSIS — I10 PRIMARY HYPERTENSION: ICD-10-CM

## 2025-03-06 DIAGNOSIS — R06.2 WHEEZING: ICD-10-CM

## 2025-03-06 DIAGNOSIS — N18.31 STAGE 3A CHRONIC KIDNEY DISEASE (H): ICD-10-CM

## 2025-03-06 DIAGNOSIS — D69.6 THROMBOCYTOPENIA: ICD-10-CM

## 2025-03-06 DIAGNOSIS — E78.5 HYPERLIPIDEMIA, UNSPECIFIED HYPERLIPIDEMIA TYPE: ICD-10-CM

## 2025-03-06 DIAGNOSIS — H91.93 BILATERAL HEARING LOSS, UNSPECIFIED HEARING LOSS TYPE: ICD-10-CM

## 2025-03-06 DIAGNOSIS — E03.9 ACQUIRED HYPOTHYROIDISM: ICD-10-CM

## 2025-03-06 DIAGNOSIS — Z00.00 ENCOUNTER FOR MEDICARE ANNUAL WELLNESS EXAM: Primary | ICD-10-CM

## 2025-03-06 LAB
ANION GAP SERPL CALCULATED.3IONS-SCNC: 10 MMOL/L (ref 7–15)
BUN SERPL-MCNC: 22.6 MG/DL (ref 8–23)
CALCIUM SERPL-MCNC: 9.2 MG/DL (ref 8.8–10.4)
CHLORIDE SERPL-SCNC: 104 MMOL/L (ref 98–107)
CHOLEST SERPL-MCNC: 207 MG/DL
CREAT SERPL-MCNC: 1.41 MG/DL (ref 0.67–1.17)
EGFRCR SERPLBLD CKD-EPI 2021: 48 ML/MIN/1.73M2
ERYTHROCYTE [DISTWIDTH] IN BLOOD BY AUTOMATED COUNT: 12.9 % (ref 10–15)
FASTING STATUS PATIENT QL REPORTED: ABNORMAL
FASTING STATUS PATIENT QL REPORTED: ABNORMAL
GLUCOSE SERPL-MCNC: 123 MG/DL (ref 70–99)
HCO3 SERPL-SCNC: 26 MMOL/L (ref 22–29)
HCT VFR BLD AUTO: 50.7 % (ref 40–53)
HDLC SERPL-MCNC: 44 MG/DL
HGB BLD-MCNC: 16.8 G/DL (ref 13.3–17.7)
LDLC SERPL CALC-MCNC: 121 MG/DL
MCH RBC QN AUTO: 31 PG (ref 26.5–33)
MCHC RBC AUTO-ENTMCNC: 33.1 G/DL (ref 31.5–36.5)
MCV RBC AUTO: 94 FL (ref 78–100)
NONHDLC SERPL-MCNC: 163 MG/DL
PLATELET # BLD AUTO: 149 10E3/UL (ref 150–450)
POTASSIUM SERPL-SCNC: 3.7 MMOL/L (ref 3.4–5.3)
RBC # BLD AUTO: 5.42 10E6/UL (ref 4.4–5.9)
SODIUM SERPL-SCNC: 140 MMOL/L (ref 135–145)
TRIGL SERPL-MCNC: 211 MG/DL
TSH SERPL DL<=0.005 MIU/L-ACNC: 8.38 UIU/ML (ref 0.3–4.2)
WBC # BLD AUTO: 6.1 10E3/UL (ref 4–11)

## 2025-03-06 RX ORDER — LISINOPRIL AND HYDROCHLOROTHIAZIDE 20; 25 MG/1; MG/1
1 TABLET ORAL DAILY
Qty: 90 TABLET | Refills: 3 | Status: SHIPPED | OUTPATIENT
Start: 2025-03-06

## 2025-03-06 SDOH — HEALTH STABILITY: PHYSICAL HEALTH: ON AVERAGE, HOW MANY MINUTES DO YOU ENGAGE IN EXERCISE AT THIS LEVEL?: 10 MIN

## 2025-03-06 SDOH — HEALTH STABILITY: PHYSICAL HEALTH: ON AVERAGE, HOW MANY DAYS PER WEEK DO YOU ENGAGE IN MODERATE TO STRENUOUS EXERCISE (LIKE A BRISK WALK)?: 5 DAYS

## 2025-03-06 ASSESSMENT — SOCIAL DETERMINANTS OF HEALTH (SDOH): HOW OFTEN DO YOU GET TOGETHER WITH FRIENDS OR RELATIVES?: MORE THAN THREE TIMES A WEEK

## 2025-03-06 ASSESSMENT — ASTHMA QUESTIONNAIRES
QUESTION_2 LAST FOUR WEEKS HOW OFTEN HAVE YOU HAD SHORTNESS OF BREATH: NOT AT ALL
QUESTION_3 LAST FOUR WEEKS HOW OFTEN DID YOUR ASTHMA SYMPTOMS (WHEEZING, COUGHING, SHORTNESS OF BREATH, CHEST TIGHTNESS OR PAIN) WAKE YOU UP AT NIGHT OR EARLIER THAN USUAL IN THE MORNING: ONCE A WEEK
QUESTION_1 LAST FOUR WEEKS HOW MUCH OF THE TIME DID YOUR ASTHMA KEEP YOU FROM GETTING AS MUCH DONE AT WORK, SCHOOL OR AT HOME: NONE OF THE TIME
QUESTION_5 LAST FOUR WEEKS HOW WOULD YOU RATE YOUR ASTHMA CONTROL: WELL CONTROLLED
ACT_TOTALSCORE: 20
QUESTION_4 LAST FOUR WEEKS HOW OFTEN HAVE YOU USED YOUR RESCUE INHALER OR NEBULIZER MEDICATION (SUCH AS ALBUTEROL): TWO OR THREE TIMES PER WEEK
ACT_TOTALSCORE: 20

## 2025-03-06 NOTE — PROGRESS NOTES
Prior to immunization administration, verified patients identity using patient s name and date of birth. Please see Immunization Activity for additional information.     Screening Questionnaire for Adult Immunization    Are you sick today?   No   Do you have allergies to medications, food, a vaccine component or latex?   No   Have you ever had a serious reaction after receiving a vaccination?   No   Do you have a long-term health problem with heart, lung, kidney, or metabolic disease (e.g., diabetes), asthma, a blood disorder, no spleen, complement component deficiency, a cochlear implant, or a spinal fluid leak?  Are you on long-term aspirin therapy?   Don't Know   Do you have cancer, leukemia, HIV/AIDS, or any other immune system problem?   No   Do you have a parent, brother, or sister with an immune system problem?   No   In the past 3 months, have you taken medications that affect  your immune system, such as prednisone, other steroids, or anticancer drugs; drugs for the treatment of rheumatoid arthritis, Crohn s disease, or psoriasis; or have you had radiation treatments?   No   Have you had a seizure, or a brain or other nervous system problem?   No   During the past year, have you received a transfusion of blood or blood    products, or been given immune (gamma) globulin or antiviral drug?   No   For women: Are you pregnant or is there a chance you could become       pregnant during the next month?   No   Have you received any vaccinations in the past 4 weeks?   No     Immunization questionnaire answers were all negative.      Patient instructed to remain in clinic for 15 minutes afterwards, and to report any adverse reactions.     Screening performed by Jaime Loving MA on 3/6/2025 at 1:08 PM.

## 2025-03-06 NOTE — PATIENT INSTRUCTIONS
"Patient Education   Lorelei Binh Xeeb Saib Xyuas Kom Tiv Thaiv Tus Kheej  Nov yog ib co lorelei binh xeeb uas peb yeej meem muab delilah tib neeg pab lawv noj qab nyob zoo. Isi zaum koj pab pawg saib xyuas yuav muaj ib co lorelei binh xeeb uas tshwj xeeb delilah koj nkaus xwb. Thov nrog koj pab pawg saib xyuas sib laury txog isi yam uas koj toob reina kom tiv thaiv koj tus kheej.  Roland Coj Lub Neej  Es xaws xais ntau jose 150 feeb txhua lub kahn tiam (30 feeb txhua hnub, 5 hnub ib lub kahn tiam).  Ua yam pab cov leeg muaj zog tuaj 2 zaug txhua lub kahn tiam. Isi yam no pab koj tswj hwm koj lub cev qhov hnyav thiab tiv thaiv ntawm kab mob.  Txhob haus luam yeeb.  Pleev tshuaj tiv thaiv tshav kub kom thiaj tsis raug mob khees xaws ntawm nqaij tawv.  Txhua 2 mus delilah 5 xyoos yuav tau kuaj koj lub tsev delilah qhov radon. Radon yog ib latasha vania uas tsis muaj xim tsis muaj ntxhiab uas mob tau koj cov ntsws. Kom thiaj kawm ntxiv, mus saib [email or web address] thiab ntaus ntawv \"Radon in Homes.\"  Ceev cov phom kom tsis muaj mos txwv delilah hauv thiab muab xauv rodrick hauv ib qho chaw nyab xeeb xws li lub txhoj, los yog muab xauv rodrick thiab muab cov yawm sij zais rodrick. Muab cov mos txwv xauv delilah hauv lwm qhov chaw nrug cov phom. Kom thiaj kawm ntxiv, mus saib [email or web address] thiab ntaus ntawv \"safe gun storage.\"  Roland Noj Qab Huv  Noj 5 qho txiv hmab txiv ntoo thiab zaub txhua hnub.  Noj nplem nplej, txhuv xim av thiab cov fawm muaj nplej (los theej nplem dawb, txhuv dawb, thiab fawm dawb).  Ua tib zoo noj calcium thiab vitamin D ntau. Saib cov ntawv lo delilah pob khoom noj thiab siv zog noj kom txog 100% RDA (qhov ntau hauv ib hnub).  Yeej meem kuaj ntsuas  Txhua 6 lub hli mus kuaj hniav thiab muab tu.  Txhua xyoo mus saib koj pab pawg saib xyuas roland noj qab nyob zoo kom sib laury txog:  Ib yam dab tsi uas hloov ntawm koj txoj roland noj qab nyob zoo.  Cov tshuaj uas koj pab pawg tau hais kom siv.  Roland saib xyuas kom tiv thaiv, roland npaj delilah tsev neeg, thiab yuav ua li alka tiv " thaiv ntawm kab mob uas ntev.  Roland txhaj tshuaj (koob tshuaj tiv thaiv)   Cov koob tshuaj HPV (txog thaum muaj 28 xyoo), yog koj yeej tsis tau txais jose li.  Cov koob tshuaj Hepatitis B Kab Mob Siab (txog thaum muaj 61 xyoos), yog koj yeej tsis tau txais jose li.  Koob tshuaj COVID-19: Txais koob tshuaj no thaum txog caij txais.  Koob tshuaj tiv thaiv ntawm mob khaub thuas: Txais txhua xyoo.  Koob tshuaj tiv thaiv mob daig tsaig: Txais txhua 10 xyoo.  Pneumococcal, hepatitis A, thiab RSV cov koob tshuaj: Nug koj pab pawg saib xyuas lakshmi puas tsim nyog delilah koj txais cov no raws li koj qhov pheej hmoo.  Koob tshuaj tiv thaiv ntawm kab mob sawv hlwv (delilah cov muaj 50 xyoo rov zhang).  Roland kuaj ntsuas delilah roland noj qab nyob zoo  Kuaj mob ntshav qab zib:  Pib thaum muaj 37 xyoos, Mus kuaj mob ntshav qab zib txhua 3 xyoos los yog ntau zog.  Yog koj tseem tsis tau txog 37 xyoos, nug koj pab pawg saib xyuas lakshmi puas tsim nyog delilah koj kuaj mob ntshav qab zib.  Kuaj cholesterol: Thaum muaj 41 xyoo, pib kuaj cholesterol txhua 5 xyoos, los yog ntau jose ntawd yog kws poly mob qhia.  Kuaj txha qhov tuab (DEXA): Thaum txog 52 xyoo lawd, nug koj pab pawg saib xyuas lakshmi puas tsim nyog delilah koj kuaj txha lakshmi puas ruaj.  Kab Mob Siab Hepatitis C: Kuaj ib zaug hauv koj lub neej.  Kuaj Txoj Hlab Ntshav Hauv Plab: Nrog koj tus kws poly mob laury txog roland kuaj ntsuas no yog koj:  Tau haus luam yeeb ib zaug li; thiab  Yog txiv neej; thiab  Nruab hnub nyoog 67 thiab 77.  Mob Reina Cees (kis mob dhau ntawm roland sib deev)  Ua ntej muaj 24 xyoos: Nug koj pab pawg saib xyuas lakshmi puas tsim nyog kuaj delilah mob reina cees.  Kp qab muaj 24 xyoos: Mus kuaj delilah mob reina cees yog koj muaj roland pheej hmoo. Koj muaj roland pheej hmoo delilah mob reina cees (suav nrog HIV) yog:  Koj sib deev nrog ntau jose ib tug neeg.  Koj tsis siv cov hnab looj qau thaum sib deev.  Koj los yog koj tus khub deev kuaj pom tias muaj mob reina cees lawm.  Yog koj muaj roland pheej hmoo delilah mob reina cees  HIV, nug txog cov tshuaj PrEP kom tiv thaiv ntawm HIV.  Mus kuaj delilah mob reina cees HIV yam ntau jose ib zaug hauv koj lub neej, txawm yog koj muaj roland pheej hmoo delilah mob reina cees HIV los tsis muaj los xij.  Kuaj delilah mob khees xaws  Kuaj lub ncauj tsev me nyuam delilah mob khees xaws: Yog koj muaj ib lub ncauj tsev me nyuam, leslee yuav tau ib sij kuaj lub ncauj tsev me nyuam seb puas muaj mob khees xaws pib thaum muaj 23 xyoos. Neeg feem coob uas ib sij kuaj lub ncauj tsev me nyuam thiab tsis pom dab tsi txawv lawv tsum tau jayshree qab muaj 67 xyoos. Nrog koj tus kws poly mob sib laury txog qhov no.  Kuaj lub mis delilah mob khees xaws (mammogram): Yog koj tau muaj mis jose li, yuav tau ib sij kuaj lub mis pib thaum muaj 40 xyoo. Roland kuaj no yog kom saib seb puas muaj mob khees xaws hauv lub mis.  Kuaj Txoj Hnyuv Delilah Mob Khees Xaws: Yeej tseem ceeb pib kuaj txoj hnyuv delilah mob khees xaws pib thaum muaj 47 xyoos.  Txhua 12 xyoo yuav tau kuaj txoj hnyuv (los yog ntau zog yog koj muaj roland pheej hmoo) Los sis, nug koj tus kws poly mob txog roland kuaj thooj quav FIT txhua xyoo los yog Cologuard txhua 5 xyoos.  Kom thiaj kawm ntxiv txog isi latasha kuaj no, mus saib: [email or web address].  Yog xav tau roland pab txog qhov no, mus saib: bit.ly/[email or web address].  Kuaj lub shalom kua phev (prostate) delilah mob khees xaws: Yog koj muaj ib lub shalom kua phev (prostate) thiab nruab hnub nyoog 55 mus delilah 69, nug koj tus kws poly mob lakshmi puas tsim nyog delilah koj kuaj lub shalom kua phev.  Kuaj ntsws delilah mob khees xaws: Yog koj haus luam yeeb bundy sim no los yog tau haus yav tas los uas muaj hnub nyoog nruab 50 xyoos mus delilah 80 xyoo, nug koj pab pawg saib xyuas lakshmi puas tsim nyog delilah koj yeej meem kuaj lub ntsws delilah mob khees xaws.    Delilah cov latasha phiaj roland siv ua ntaub ntawv qhia nkaus xwb. Yuav tsis pauv roland qhia los ntawm koj qhov chaw poly mob. Copyright (tang tavares)   2023 [facility].   Txhua txoj chaitanya raug tswj tseg lawm. Tshaj xyuas los ntawm [facility]. [place] [id number] - REV 04/24.  Hearing Loss: Care Instructions  Overview     Hearing loss is a sudden or slow decrease in how well you hear. It can range from slight to profound. Permanent hearing loss can occur with aging. It also can happen when you are exposed long-term to loud noise. Examples include listening to loud music, riding motorcycles, or being around other loud machines.  Hearing loss can affect your work and home life. It can make you feel lonely or depressed. You may feel that you have lost your independence. But hearing aids and other devices can help you hear better and feel connected to others.  Follow-up care is a key part of your treatment and safety. Be sure to make and go to all appointments, and call your doctor if you are having problems. It's also a good idea to know your test results and keep a list of the medicines you take.  How can you care for yourself at home?  Avoid loud noises whenever possible. This helps keep your hearing from getting worse.  Always wear hearing protection around loud noises.  Wear a hearing aid as directed.  A professional can help you pick a hearing aid that will work best for you.  You can also get hearing aids over the counter for mild to moderate hearing loss.  Have hearing tests as your doctor suggests. They can show whether your hearing has changed. Your hearing aid may need to be adjusted.  Use other devices as needed. These may include:  Telephone amplifiers and hearing aids that can connect to a television, stereo, radio, or microphone.  Devices that use lights or vibrations. These alert you to the doorbell, a ringing telephone, or a baby monitor.  Television closed-captioning. This shows the words at the bottom of the screen. Most new TVs can do this.  TTY (text telephone). This lets you type messages back and forth on the telephone instead of talking or listening. These devices are also called TDD. When messages are  "typed on the keyboard, they are sent over the phone line to a receiving TTY. The message is shown on a monitor.  Use text messaging, social media, and email if it is hard for you to communicate by telephone.  Try to learn a listening technique called speechreading. It is not lipreading. You pay attention to people's gestures, expressions, posture, and tone of voice. These clues can help you understand what a person is saying. Face the person you are talking to, and have them face you. Make sure the lighting is good. You need to see the other person's face clearly.  Think about counseling if you need help to adjust to your hearing loss.  When should you call for help?  Watch closely for changes in your health, and be sure to contact your doctor if:    You think your hearing is getting worse.     You have new symptoms, such as dizziness or nausea.   Where can you learn more?  Go to [email or web address]  Enter R798 in the search box to learn more about \"Hearing Loss: Care Instructions.\"  Current as of: September 27, 2023  Content Version: 14.3    2024 [employer].   Care instructions adapted under license by your healthcare professional. If you have questions about a medical condition or this instruction, always ask your healthcare professional. [employer] disclaims any warranty or liability for your use of this information.       "

## 2025-03-09 PROBLEM — N18.31 STAGE 3A CHRONIC KIDNEY DISEASE (H): Status: ACTIVE | Noted: 2021-09-21

## 2025-03-09 PROBLEM — R80.8 OTHER PROTEINURIA: Status: ACTIVE | Noted: 2025-03-09

## 2025-03-10 LAB
EST. AVERAGE GLUCOSE BLD GHB EST-MCNC: 128 MG/DL
HBA1C MFR BLD: 6.1 % (ref 0–5.6)

## 2025-03-18 NOTE — TELEPHONE ENCOUNTER
Patient was seen 03/06/25 at [facility]. Form was completed.    Writer will place the form we have in monthly hold bin.

## 2025-04-03 ENCOUNTER — PATIENT OUTREACH (OUTPATIENT)
Dept: GERIATRIC MEDICINE | Facility: CLINIC | Age: 89
End: 2025-04-03
Payer: COMMERCIAL

## 2025-04-03 NOTE — PROGRESS NOTES
Subjective   Patient ID: Boone Kaba is a 33 y.o. male who presents for Possible Hernia (Possible hernia on lt side. Says it is bulging in that are of abd. Noticed 2 months ago. Not painful.).  HPI    No specific injury. No gi / gu sxs.  Sl larger in size after a long run .    No pain. No constipation/ diarrhea.   No hx of surgery to that area    Review of Systems    Objective   /83 (BP Location: Left arm, Patient Position: Sitting, BP Cuff Size: Adult)   Pulse 55   Temp 36.5 °C (97.7 °F) (Temporal)   Resp 16   Wt 79.5 kg (175 lb 4.8 oz)   SpO2 98%   BMI 24.80 kg/m²     Physical Exam  Abdominal:               Pertinent exam : 1 cm nontender protrusion of muscle wall. Not present when supine . Visible when standing and with left leg/ hip externally rotated    No inguinal lymph nodes/ mass   Assessment/Plan   Problem List Items Addressed This Visit    None  Visit Diagnoses       Hernia of abdominal wall    -  Primary          Slowly progressive .  Monitor for changes in sxs .   Okay to  exercise  .           JUAN DANIEL George MD   Preventive Care Visit  [facility]  Ni Medina PA-C, Family Medicine  Mar 6, 2025      Assessment & Plan   1. Encounter for Medicare annual wellness exam (Primary)  Generally doing well.  Living with family.  Forms completed for his adult day center today.  - Basic metabolic panel  - Lipid Profile    2. Primary hypertension  Chronic, uncontrolled.  Patient was not taking his blood pressure medicine.  When I reviewed his chart, it looks like he decided to stop this on his own sometime ago.  I discussed with patient today that it is important to take this, as it can help prevent strokes, heart issues, and further kidney damage.  He states he is okay restarting this.  Prescription sent to his pharmacy.  Screening labs ordered and I will follow-up with results.  Appointment made with his PCP to recheck blood pressure in 1 month.  - lisinopril-hydrochlorothiazide (ZESTORETIC) 20-25 MG tablet; Take 1 tablet by mouth daily.  Dispense: 90 tablet; Refill: 3  - Basic metabolic panel  - Lipid Profile    3. Stage 3a chronic kidney disease (H)  Chronic, stable.  Kidney function tests have been fairly stable recently.  I will recheck today and follow-up with results.  - Basic metabolic panel  - Lipid Profile  - Albumin Random Urine Quantitative with Creat Ratio    4. Acquired hypothyroidism  Chronic, stable.  Not taking any thyroid medicine right now.  Last thyroid check a year ago was normal.  Will recheck today and I will follow-up with results.  - TSH with free T4 reflex    5. Hyperlipidemia, unspecified hyperlipidemia type  Chronic, stable.  He is not taking any medicine for cholesterol right now.  Screening labs ordered and I will follow-up with results.    6. Wheezing  Chronic, stable.  He has been following with his PCP TEA Belle for this.  Unclear what inhalers he is having at home and which ones he is using.  Vitals were generally reassuring today.  He has a follow-up with his PCP in 1  month.    7. Bilateral hearing loss, unspecified hearing loss type  Chronic, stable.  Patient states he is not interested in hearing aids at this time.    8. Thrombocytopenia  Chronic, stable.  Has been generally stable in the past.  Will recheck today.  - CBC with platelets    BMI  Estimated body mass index is 32.81 kg/m  as calculated from the following:    Height as of this encounter: 1.524 m (5').    Weight as of this encounter: 76.2 kg (168 lb).   Weight management plan: Patient referred to endocrine and/or weight management specialty          Lio Shaw is a 89 year old, presenting for the following:  Wellness Visit and Recheck Medication (Patient is unsure of what medication he is taking, he did not bring medications with)        3/6/2025     1:05 PM   Additional Questions   Roomed by Jaime IRVING   Accompanied by self           HPI  Generally doing well.  He needs forms completed for his adult day center today.  He has been following with his PCP for wheezing.  He has DuoNeb, albuterol inhaler, and albuterol nebulizer extremely on his med list.  He cannot tell me what he is taking and when.  He feels like his breathing is about the same as it has been in the past.    Chronic hearing loss.  He is not interested in pursuing further evaluation at this time.  Had an abnormal score for dementia screening.  I have no significant concerns today.  I think this may be due to age and cultural differences.  He is living with family, can complete ADLs without problems, especially if his family helps.    Was seen several months ago for rectal fissure, rectal pain, rectal bleeding.  He reports those have resolved.    Advance Care Planning  Patient does not have a Health Care Directive: Advance Directive received and scanned. Click on Code in the patient header to view.      3/6/2025   General Health   How would you rate your overall physical health? Good   Feel stress (tense, anxious, or unable to sleep) Not at all          3/6/2025   Nutrition   Diet: I don't know         3/6/2025   Exercise   Days per week of moderate/strenous exercise 5 days   Average minutes spent exercising at this level 10 min         3/6/2025   Social Factors   Frequency of gathering with friends or relatives More than three times a week   Worry food won't last until get money to buy more No   Food not last or not have enough money for food? No   Do you have housing? (Housing is defined as stable permanent housing and does not include staying ouside in a car, in a tent, in an abandoned building, in an overnight shelter, or couch-surfing.) Yes   Are you worried about losing your housing? No   Lack of transportation? No   Unable to get utilities (heat,electricity)? No         3/6/2025   Fall Risk   Fallen 2 or more times in the past year? No   Trouble with walking or balance? No          3/6/2025   Activities of Daily Living- Home Safety   Needs help with the following daily activites None of the above   Safety concerns in the home None of the above         3/6/2025   Dental   Dentist two times every year? (!) NO         3/6/2025   Hearing Screening   Hearing concerns? (!) I NEED TO ASK PEOPLE TO SPEAK UP OR REPEAT THEMSELVES.    (!) TROUBLE UNDERSTANDING SOFT OR WHISPERED SPEECH.    (!) TROUBLE UNDERSTANDING SPEECH ON THE TELEPHONE       Multiple values from one day are sorted in reverse-chronological order         3/6/2025   Driving Risk Screening   Patient/family members have concerns about driving No         3/6/2025   General Alertness/Fatigue Screening   Have you been more tired than usual lately? No         3/6/2025   Urinary Incontinence Screening   Bothered by leaking urine in past 6 months No            Today's PHQ-2 Score:       3/6/2025    12:56 PM   PHQ-2 ( 1999 Pfizer)   Q1: Little interest or pleasure in doing things 0   Q2: Feeling down, depressed or hopeless 0   PHQ-2 Score 0    Q1: Little interest or pleasure in doing things Not at all    Q2: Feeling down, depressed or hopeless Not at all   PHQ-2 Score 0       Patient-reported           3/6/2025   Substance Use   Alcohol more than 3/day or more than 7/wk No   Do you have a current opioid prescription? No   How severe/bad is pain from 1 to 10? 0/10 (No Pain)   Do you use any other substances recreationally? No     Social History     Tobacco Use    Smoking status: Never     Passive exposure: Never    Smokeless tobacco: Never   Vaping Use    Vaping status: Never Used   Substance Use Topics    Alcohol use: No    Drug use: No             Reviewed and updated as needed this visit by Provider                    Current providers sharing in care for this patient include:  Patient Care Team:  Allison Belle NP as PCP - General  Allison Belle NP as Assigned PCP  Eliane Cao RN as Lead Care Coordinator (Primary Care - CC)    The following health maintenance items are reviewed in Epic and correct as of today:  Health Maintenance   Topic Date Due    ANNUAL REVIEW OF  ORDERS  Never done    ASTHMA ACTION PLAN  Never done    RSV VACCINE (1 - 1-dose 75+ series) Never done    MEDICARE ANNUAL WELLNESS VISIT  09/26/2023    LIPID  05/22/2024    MICROALBUMIN  05/22/2024    TSH W/FREE T4 REFLEX  02/11/2025    COVID-19 Vaccine (6 - 2024-25 season) 04/30/2025    ASTHMA CONTROL TEST  09/06/2025    BMP  11/24/2025    HEMOGLOBIN  11/24/2025    FALL RISK ASSESSMENT  03/06/2026    COLORECTAL CANCER SCREENING  11/22/2026    ADVANCE CARE PLANNING  09/26/2027    DTAP/TDAP/TD IMMUNIZATION (3 - Td or Tdap) 09/21/2031    PHQ-2 (once per calendar year)  Completed    INFLUENZA VACCINE  Completed    Pneumococcal Vaccine: 50+ Years  Completed    URINALYSIS  Completed    ZOSTER IMMUNIZATION  Addressed    HPV IMMUNIZATION  Aged Out    MENINGITIS IMMUNIZATION  Aged Out            Objective    Exam  BP (!) 156/98 (BP Location: Left arm, Patient Position: Sitting, Cuff Size: Adult Regular)   Pulse 87   Temp 97.9  F (36.6  C) (Temporal)    Resp 24   Ht 1.524 m (5')   Wt 76.2 kg (168 lb)   SpO2 98%   BMI 32.81 kg/m     Estimated body mass index is 32.81 kg/m  as calculated from the following:    Height as of this encounter: 1.524 m (5').    Weight as of this encounter: 76.2 kg (168 lb).    Physical Exam  GENERAL: alert and no distress  EYES: Eyes grossly normal to inspection, PERRL and conjunctivae and sclerae normal  HENT: ear canals and TM's normal, nose and mouth without ulcers or lesions  RESP: lungs clear to auscultation - no rales, rhonchi or wheezes  CV: regular rate and rhythm, normal S1 S2, no S3 or S4, no murmur, click or rub, no peripheral edema  MS: no gross musculoskeletal defects noted, no edema  SKIN: no suspicious lesions or rashes  NEURO: Normal strength and tone, mentation intact and speech normal  PSYCH: mentation appears normal, affect normal/bright    Patient did not want to get up on the exam table today, so exam was done in chair, slightly limited.        3/6/2025   Mini Cog   Clock Draw Score 0 Abnormal   3 Item Recall 0 objects recalled   Mini Cog Total Score 0              Signed Electronically by: Ni Medina PA-C

## 2025-04-04 NOTE — PROGRESS NOTES
Called adult son Robbie to schedule annual HRA home visit. HRA has been scheduled for 4/23/2025 at 11:00 am.    Eliane Cao RN, BSN, PHN  [facility] Care Coordinator  Phone: [phone]   Fax [phone]   [email or web address]

## 2025-04-06 DIAGNOSIS — E03.9 ACQUIRED HYPOTHYROIDISM: Primary | ICD-10-CM

## 2025-04-06 DIAGNOSIS — N18.31 STAGE 3A CHRONIC KIDNEY DISEASE (H): ICD-10-CM

## 2025-04-07 ENCOUNTER — TELEPHONE (OUTPATIENT)
Dept: INTERNAL MEDICINE | Facility: CLINIC | Age: 89
End: 2025-04-07
Payer: COMMERCIAL

## 2025-04-07 NOTE — TELEPHONE ENCOUNTER
------ Ni Medina PA-C sent at 4/6/2025  4:15 PM CDT -----  His blood cell counts are stable.   His average blood sugars are a little bit high, keep working on healthy diet and exercise.   His thyroid hormone level is a little high.  He told me at our visit that he stopped his thyroid medicine.  I would recommend restarting it, or not take it and rechecking this with a lab-only visit in 3 months.   Cholesterol looks OK.   His kidney function is lower than normal, pretty similar to what it has been in the past.  We should recheck this with a lab-only visit in 3 months.

## 2025-04-09 ENCOUNTER — OFFICE VISIT (OUTPATIENT)
Dept: INTERNAL MEDICINE | Facility: CLINIC | Age: 89
End: 2025-04-09
Payer: COMMERCIAL

## 2025-04-09 VITALS
WEIGHT: 165 LBS | RESPIRATION RATE: 24 BRPM | OXYGEN SATURATION: 97 % | SYSTOLIC BLOOD PRESSURE: 144 MMHG | HEART RATE: 84 BPM | DIASTOLIC BLOOD PRESSURE: 98 MMHG | BODY MASS INDEX: 32.39 KG/M2 | HEIGHT: 60 IN

## 2025-04-09 DIAGNOSIS — E03.9 ACQUIRED HYPOTHYROIDISM: ICD-10-CM

## 2025-04-09 DIAGNOSIS — I71.60 THORACOABDOMINAL AORTIC ANEURYSM (TAAA) WITHOUT RUPTURE, UNSPECIFIED PART: ICD-10-CM

## 2025-04-09 DIAGNOSIS — I10 PRIMARY HYPERTENSION: Primary | ICD-10-CM

## 2025-04-09 DIAGNOSIS — N18.31 STAGE 3A CHRONIC KIDNEY DISEASE (H): ICD-10-CM

## 2025-04-09 DIAGNOSIS — R06.02 SHORTNESS OF BREATH: ICD-10-CM

## 2025-04-09 DIAGNOSIS — R13.10 DYSPHAGIA, UNSPECIFIED TYPE: ICD-10-CM

## 2025-04-09 PROCEDURE — 99214 OFFICE O/P EST MOD 30 MIN: CPT | Performed by: NURSE PRACTITIONER

## 2025-04-09 PROCEDURE — 1126F AMNT PAIN NOTED NONE PRSNT: CPT | Performed by: NURSE PRACTITIONER

## 2025-04-09 PROCEDURE — 3077F SYST BP >= 140 MM HG: CPT | Performed by: NURSE PRACTITIONER

## 2025-04-09 PROCEDURE — 3080F DIAST BP >= 90 MM HG: CPT | Performed by: NURSE PRACTITIONER

## 2025-04-09 PROCEDURE — G2211 COMPLEX E/M VISIT ADD ON: HCPCS | Performed by: NURSE PRACTITIONER

## 2025-04-09 RX ORDER — AMLODIPINE BESYLATE 5 MG/1
5 TABLET ORAL DAILY
Qty: 90 TABLET | Refills: 3 | Status: SHIPPED | OUTPATIENT
Start: 2025-04-09

## 2025-04-09 ASSESSMENT — PAIN SCALES - GENERAL: PAINLEVEL_OUTOF10: NO PAIN (0)

## 2025-04-09 NOTE — PROGRESS NOTES
{PROVIDER CHARTING PREFERENCE:504614}    Lio Shaw is a 89 year old, presenting for the following health issues:  Hypertension and Follow Up        4/9/2025     3:44 PM   Additional Questions   Roomed by Lorenzo LLANOS CMA   Accompanied by Family member     HPI      {MA/LPN/RN Pre-Provider Visit Orders- hCG/UA/Strep (Optional):772888}  Hypertension Follow-up    Do you check your blood pressure regularly outside of the clinic? Yes   Are you following a low salt diet? No  Are your blood pressures ever more than 140 on the top number (systolic) OR more   than 90 on the bottom number (diastolic), for example 140/90? Yes    BP Readings from Last 2 Encounters:   04/09/25 (!) 148/106   03/06/25 (!) 148/98       {additonal problems for provider to add (Optional):523603}    {ROS Picklists (Optional):287640}      Objective    BP (!) 148/106 (BP Location: Right arm, Patient Position: Sitting, Cuff Size: Adult Regular)   Pulse 84   Resp 24   Ht 1.524 m (5')   Wt 74.8 kg (165 lb)   SpO2 97%   BMI 32.22 kg/m    Body mass index is 32.22 kg/m .  Physical Exam   {Exam List (Optional):400721}    {Diagnostic Test Results (Optional):091359}        Signed Electronically by: Allison Belle NP  {Email feedback regarding this note to [email or web address]   :[id number]}

## 2025-04-09 NOTE — PROGRESS NOTES
Internal Medicine Office Visit  [address]  [place] [address]  Phone: [phone]  Fax: [phone]        Patient Name: Colin Car  Patient Age: 89 year old  YOB: 1935  MRN: [id number]    Date of Visit: 4/9/2025  Primary Provider: Allison Belle    Subjective   Patient presents with:  Hypertension: Pt states he has not had medication the past two days due to forgetting it.   Follow Up         4/9/2025     3:44 PM   Additional Questions   Roomed by Lorenzo LLANOS CMA   Accompanied by Family member     HPI:  Colin Car, 89 years old, male. Here with his son today.     - High blood pressure, sometimes skips medication.  - Breathing issues when walking, he attributes to old age.  - Occasional coughing when eating, particularly with pepper, possibly due to choking.      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    Stage 3a chronic kidney disease (H)    Generalized weakness    Pneumonia of right upper lobe due to infectious organism    Wheezing    Shortness of breath    Pulmonary nodules    Pharyngitis, unspecified etiology    Other proteinuria    Dysphagia, unspecified type     Current Scheduled Meds:  Current Outpatient Medications   Medication Sig Dispense Refill    albuterol (PROAIR HFA/PROVENTIL HFA/VENTOLIN HFA) 108 (90 Base) MCG/ACT inhaler Inhale 2 puffs into the lungs every 6 hours as needed for shortness of breath, wheezing or cough 18 g 0    albuterol (PROVENTIL) (2.5 MG/3ML) 0.083% neb solution Take 1 vial (2.5 mg) by nebulization every 6 hours as needed for shortness of breath, wheezing or cough 90 mL 3    amLODIPine (NORVASC) 5 MG tablet Take 1  tablet (5 mg) by mouth daily. 90 tablet 3    docusate sodium (COLACE) 50 MG capsule Take 1 capsule (50 mg) by mouth at bedtime. 30 capsule 0    lisinopril-hydrochlorothiazide (ZESTORETIC) 20-25 MG tablet Take 1 tablet by mouth daily. 90 tablet 3         Objective   Physical Examination:  Vitals:    04/09/25 1550 04/09/25 1636   BP: (!) 148/106 (!) 144/98   BP Location: Right arm    Patient Position: Sitting    Cuff Size: Adult Regular    Pulse: 84    Resp: 24    SpO2: 97%    Weight: 74.8 kg (165 lb)    Height: 1.524 m (5')      Wt Readings from Last 5 Encounters:   04/09/25 74.8 kg (165 lb)   03/06/25 76.2 kg (168 lb)   11/24/24 73.6 kg (162 lb 4.8 oz)   02/15/24 71.8 kg (158 lb 3.2 oz)   02/11/24 72.3 kg (159 lb 6.3 oz)     Body mass index is 32.22 kg/m .     Constitutional: In no apparent distress  Eyes: Conjunctivae clear. Non-icteric.   Respiratory: Clear to auscultation bilaterally. Normal inspiratory and expiratory effort  Cardiovascular: Regular rate and rhythm. No murmurs, rubs, or gallops. No edema.        Diagnoses managed today:  1. Primary hypertension    2. Shortness of breath    3. Acquired hypothyroidism    4. Dysphagia, unspecified type    5. Thoracoabdominal aortic aneurysm (TAAA) without rupture, unspecified part    6. Stage 3a chronic kidney disease (H)         Assessment & Plan   Primary hypertension:  - Blood pressure is high, possibly due to inconsistent medication intake and/or may not be adequate.   - Add amlodipine to be taken once daily along with the current medication. Recheck blood pressure in two weeks. Emphasize consistent daily intake of medication.  - Continue lisinopril-HCTZ 20-25 mg daily   - Risks and side effects: Risk of stroke or heart attack if blood pressure remains high.     Shortness of breath:  - No worsening reported but PFTs were recommended due to suspected COPD but he never completed testing. he is not interested in this testing as he suspects that his shortness of  breath is r/t old age and does not think it is necessary     Acquired hypothyroidism:  - Thyroid levels slightly abnormal but not currently on medication.  - Recheck thyroid levels in three months along with kidney function. Consider medication if TSH >10.     Dysphagia, unspecified type:  - Coughing when eating, possibly due to swallowing issues.  - Order a swallowing test with a speech therapist to assess and address swallowing difficulties.     Stage 3a chronic kidney disease (H):  - Kidney function slightly lower than previous check.  - Recheck kidney function in three months.  - He is advised to avoid nephrotoxic medications and hydrate well     TAAA without rupture  - He declines to continue with routine imaging for monitoring due to his age     I have discontinued Shaw P. Josep's ipratropium - albuterol 0.5 mg/2.5 mg/3 mL. I am also having him start on amLODIPine. Additionally, I am having him maintain his albuterol, albuterol, docusate sodium, and lisinopril-hydrochlorothiazide.        Orders Placed This Encounter   Procedures    Basic metabolic panel    TSH with free T4 reflex    Speech Therapy  Referral       Follow up: Return in about 2 months (around 6/9/2025). Sooner if needed.    The longitudinal plan of care for the diagnosis(es)/condition(s) as documented were addressed during this visit. Due to the added complexity in care, I will continue to support Shaw in the subsequent management and with ongoing continuity of care.    Allison Belle, DNP, APRN, CNP   Electronically signed

## 2025-04-23 ENCOUNTER — PATIENT OUTREACH (OUTPATIENT)
Dept: GERIATRIC MEDICINE | Facility: CLINIC | Age: 89
End: 2025-04-23
Payer: COMMERCIAL

## 2025-04-23 ASSESSMENT — LIFESTYLE VARIABLES
SKIP TO QUESTIONS 9-10: 1
AUDIT-C TOTAL SCORE: 0

## 2025-04-24 ENCOUNTER — OFFICE VISIT (OUTPATIENT)
Dept: INTERNAL MEDICINE | Facility: CLINIC | Age: 89
End: 2025-04-24
Payer: COMMERCIAL

## 2025-04-24 VITALS
BODY MASS INDEX: 31.8 KG/M2 | WEIGHT: 162 LBS | HEIGHT: 60 IN | HEART RATE: 94 BPM | RESPIRATION RATE: 20 BRPM | OXYGEN SATURATION: 95 % | DIASTOLIC BLOOD PRESSURE: 80 MMHG | SYSTOLIC BLOOD PRESSURE: 130 MMHG | TEMPERATURE: 97.7 F

## 2025-04-24 DIAGNOSIS — I10 PRIMARY HYPERTENSION: Primary | ICD-10-CM

## 2025-04-24 DIAGNOSIS — I71.60 THORACOABDOMINAL AORTIC ANEURYSM (TAAA) WITHOUT RUPTURE, UNSPECIFIED PART: ICD-10-CM

## 2025-04-24 DIAGNOSIS — I71.21 ANEURYSM OF ASCENDING AORTA WITHOUT RUPTURE: ICD-10-CM

## 2025-04-24 NOTE — PROGRESS NOTES
Internal Medicine Office Visit  [address] [place] [address]  Phone: [phone]  Fax: [phone]          Patient Name: Colin Car  Patient Age: 89 year old  YOB: 1935  MRN: [id number]    Date of Visit: 4/24/2025  Primary Provider: Allison Belle    Subjective   Patient presents with:  Follow Up: 2 week follow up for blood pressure          4/24/2025     6:55 AM   Additional Questions   Roomed by Beto Gonzales     HPI:  Colin Car, 89 years old, male   - Blood pressure medication has been helping, but not always taken at the same time each day.   - Previous CT scan of the chest showed ascending aortic aneurysm. In the past he declined a follow up CT of the chest, today states he would like to proceed with this scan   - Willing to undergo surgery if the aneurysm has worsened.      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    Stage 3a chronic kidney disease (H)    Generalized weakness    Pneumonia of right upper lobe due to infectious organism    Wheezing    Shortness of breath    Pulmonary nodules    Pharyngitis, unspecified etiology    Other proteinuria    Dysphagia, unspecified type     Current Scheduled Meds:  Current Outpatient Medications   Medication Sig Dispense Refill    albuterol (PROAIR HFA/PROVENTIL HFA/VENTOLIN HFA) 108 (90 Base) MCG/ACT inhaler Inhale 2 puffs into the lungs every 6 hours as needed for shortness of breath, wheezing or cough 18 g 0    albuterol (PROVENTIL) (2.5 MG/3ML) 0.083% neb solution Take 1 vial (2.5 mg) by nebulization every 6 hours as needed for shortness of breath, wheezing or cough 90 mL 3    amLODIPine (NORVASC) 5  MG tablet Take 1 tablet (5 mg) by mouth daily. 90 tablet 3    docusate sodium (COLACE) 50 MG capsule Take 1 capsule (50 mg) by mouth at bedtime. 30 capsule 0    lisinopril-hydrochlorothiazide (ZESTORETIC) 20-25 MG tablet Take 1 tablet by mouth daily. 90 tablet 3         Objective   Physical Examination:  Vitals:    04/24/25 0658 04/24/25 0718   BP: (!) 134/92 130/80   Pulse: 94    Resp: 20    Temp: 97.7  F (36.5  C)    TempSrc: Oral    SpO2: 95%    Weight: 73.5 kg (162 lb)    Height: 1.524 m (5')      Wt Readings from Last 5 Encounters:   04/24/25 73.5 kg (162 lb)   04/09/25 74.8 kg (165 lb)   03/06/25 76.2 kg (168 lb)   11/24/24 73.6 kg (162 lb 4.8 oz)   02/15/24 71.8 kg (158 lb 3.2 oz)     Body mass index is 31.64 kg/m .     Constitutional: In no apparent distress      Diagnoses managed today:  1. Primary hypertension    2. Thoracoabdominal aortic aneurysm (TAAA) without rupture, unspecified part         Assessment & Plan     Primary hypertension:  - Blood pressure has improved but is not yet at the desired level but he has not yet taken the medication today   - Continue current blood pressure medication without dose changes. Emphasize taking medication at the same time each day to improve efficacy. Follow-up appointment scheduled in six months to monitor blood pressure.    Thoracoabdominal aortic aneurysm (TAAA) without rupture, unspecified part:  - Patient is open to repeating a chest CT to monitor for changes.  - Order a chest CT scan to assess any changes in the aneurysm. Imaging department will contact the patient to schedule the scan. Continue taking blood pressure medication to prevent aneurysm enlargement.        I am having Colin Car maintain his albuterol, albuterol, docusate sodium, lisinopril-hydrochlorothiazide, and amLODIPine.        Orders Placed This Encounter   Procedures    CTA Chest Abdomen with Contrast       Follow up: Return in about 6 months (around 10/24/2025) for follow-up per plan.  Sooner if needed.    The longitudinal plan of care for the diagnosis(es)/condition(s) as documented were addressed during this visit. Due to the added complexity in care, I will continue to support Shaw in the subsequent management and with ongoing continuity of care.    Allison Belle, DNP, APRN, CNP   Electronically signed

## 2025-04-24 NOTE — PROGRESS NOTES
{PROVIDER CHARTING PREFERENCE:163912}    Lio Shaw is a 89 year old, presenting for the following health issues:  Follow Up (2 week follow up for blood pressure )        4/24/2025     6:55 AM   Additional Questions   Roomed by Beto Gonzales     History of Present Illness       Hypertension: He presents for follow up of hypertension.  He does not check blood pressure  regularly outside of the clinic. Outside blood pressures have been over 140/90. He follows a low salt diet.    He is taking medications regularly.        {MA/LPN/RN Pre-Provider Visit Orders- hCG/UA/Strep (Optional):598341}  {SUPERLIST (Optional):466431}  {additonal problems for provider to add (Optional):314596}    {ROS Picklists (Optional):152414}      Objective    /88   Pulse 94   Temp 97.7  F (36.5  C) (Oral)   Resp 20   Ht 1.524 m (5')   Wt 73.5 kg (162 lb)   SpO2 95%   BMI 31.64 kg/m    Body mass index is 31.64 kg/m .  Physical Exam   {Exam List (Optional):616022}    {Diagnostic Test Results (Optional):318201}        Signed Electronically by: Allison Belle NP  {Email feedback regarding this note to [email or web address]   :512133}

## 2025-04-27 ENCOUNTER — HEALTH MAINTENANCE LETTER (OUTPATIENT)
Age: 89
End: 2025-04-27

## 2025-04-29 ENCOUNTER — APPOINTMENT (OUTPATIENT)
Dept: INTERPRETER SERVICES | Facility: CLINIC | Age: 89
End: 2025-04-29
Payer: COMMERCIAL

## 2025-05-01 NOTE — PROGRESS NOTES
[facility] Care Coordination Contact    [facility] Home Visit Assessment     Home visit for Health Risk Assessment with Colin Car completed on April 23, 2025    Type of residence:: Private home - stairs  Current living arrangement:: I live in a private home with spouse     Assessment completed with:: Patient, Care Team Member    Current Care Plan  Member currently receiving the following home care services:     Member currently receiving the following community resources: PCA, Day Care, Transportation Services, County Programs, County Worker      Medication Review  Medication reconciliation completed in Epic: Yes  Medication set-up & administration: Independent-does not set up.  Self-administers medications.  Medication Risk Assessment Medication (1 or more, place referral to MTM): Colin reports that he has stopped taking all medications except for his inhalers. He believes that the medications should have already resolved his health issues, and if they haven't, he sees no reason to continue taking them.   MTM Referral Placed: No: Member declined    Mental/Behavioral Health   Depression Screening:   PHQ-2 Total Score (Adult) - Positive if 3 or more points; Administer PHQ-9 if positive: 2    Mental health DX:: No        Falls Assessment:   Fallen 2 or more times in the past year?: No   Any fall with injury in the past year?: No    ADL/IADL Dependencies:   Dependent ADLs:: Ambulation-cane, Bathing, Dressing, Grooming, Toileting  Dependent IADLs:: Cleaning, Cooking, Laundry, Shopping, Meal Preparation, Medication Management, Money Management, Transportation    Health Plan sponsored benefits: [employer]: Shared information regarding [employer]. Reviewed preventative health screening and health plan supplemental benefits/incentives. Reviewed medication disposal form.    CFSS Assessment completed at visit: Yes Annual CFSS assessment indicated 3.5 hours per day of CFSS. This is the same  as the previous assessment.     Elderly Waiver Eligibility: Yes-will continue on EW    Care Plan & Recommendations: Colin expressed a strong desire to maintain his independence by continuing to live at home with the support of his family. He wishes to continue receiving PCA/CFSS services, which assist with personal care, housekeeping, laundry, meal preparation, shopping, transportation, and coordination and advocacy for medical appointments. He also wants to continue attending the [facility], as it gives him the opportunity to leave the home, engage socially, and participate in structured activities.    See [employer] Assessment for detailed assessment information.    Follow-Up Plan: Member informed of future contact, plan to f/u with member with a 6 month telephone assessment.  Contact information shared with member and family, encouraged member to call with any questions or concerns at any time.    [facility] care continuum providers: Please see Snapshot and Care Management Flowsheets for Specific details of care plan.    This CC note routed to PCP, Allison Belle.    Eliane Cao RN, BSN, PHN  [facility] Care Coordinator  Phone: [phone]   Fax [phone]   [email or web address]

## 2025-05-07 ENCOUNTER — HOSPITAL ENCOUNTER (OUTPATIENT)
Dept: CT IMAGING | Facility: HOSPITAL | Age: 89
Discharge: HOME | End: 2025-05-07
Attending: NURSE PRACTITIONER
Payer: COMMERCIAL

## 2025-05-07 DIAGNOSIS — I71.60 THORACOABDOMINAL AORTIC ANEURYSM (TAAA) WITHOUT RUPTURE, UNSPECIFIED PART: ICD-10-CM

## 2025-05-07 LAB
CREAT BLD-MCNC: 2 MG/DL (ref 0.7–1.2)
EGFRCR SERPLBLD CKD-EPI 2021: 32 ML/MIN/1.73M2

## 2025-05-07 PROCEDURE — 250N000011 HC RX IP 250 OP 636: Performed by: NURSE PRACTITIONER

## 2025-05-07 PROCEDURE — 250N000009 HC RX 250: Performed by: NURSE PRACTITIONER

## 2025-05-07 PROCEDURE — 82565 ASSAY OF CREATININE: CPT

## 2025-05-07 PROCEDURE — 71275 CT ANGIOGRAPHY CHEST: CPT

## 2025-05-07 RX ORDER — IOPAMIDOL 755 MG/ML
67 INJECTION, SOLUTION INTRAVASCULAR ONCE
Status: COMPLETED | OUTPATIENT
Start: 2025-05-07 | End: 2025-05-07

## 2025-05-07 RX ADMIN — SODIUM CHLORIDE 98 ML: 9 INJECTION, SOLUTION INTRAVENOUS at 08:07

## 2025-05-07 RX ADMIN — IOPAMIDOL 67 ML: 755 INJECTION, SOLUTION INTRAVENOUS at 07:59

## 2025-05-08 ENCOUNTER — TELEPHONE (OUTPATIENT)
Dept: INTERNAL MEDICINE | Facility: CLINIC | Age: 89
End: 2025-05-08
Payer: COMMERCIAL

## 2025-05-08 ENCOUNTER — RESULTS FOLLOW-UP (OUTPATIENT)
Dept: INTERNAL MEDICINE | Facility: CLINIC | Age: 89
End: 2025-05-08

## 2025-05-08 NOTE — TELEPHONE ENCOUNTER
Called using [employer]  service. Left message for patient to call back. When patient calls back please rely message from provider below and assist as needed.     PCP: Call pt with : CTA CHEST ABDOMEN PELVIS W CONTRAST     the aneurysm is stable, it is below the size that we consider surgical repair.

## 2025-05-13 ENCOUNTER — APPOINTMENT (OUTPATIENT)
Dept: INTERPRETER SERVICES | Facility: CLINIC | Age: 89
End: 2025-05-13
Payer: COMMERCIAL

## 2025-05-13 NOTE — TELEPHONE ENCOUNTER
Called and left voicemail with  to call back. If she calls back please relay message below from PCP.